# Patient Record
Sex: FEMALE | Race: NATIVE HAWAIIAN OR OTHER PACIFIC ISLANDER | NOT HISPANIC OR LATINO | URBAN - METROPOLITAN AREA
[De-identification: names, ages, dates, MRNs, and addresses within clinical notes are randomized per-mention and may not be internally consistent; named-entity substitution may affect disease eponyms.]

---

## 2023-06-02 ENCOUNTER — INPATIENT (INPATIENT)
Facility: HOSPITAL | Age: 63
LOS: 10 days | Discharge: HOME HEALTH | DRG: 65 | End: 2023-06-13
Attending: PSYCHIATRY & NEUROLOGY | Admitting: INTERNAL MEDICINE
Payer: MEDICARE

## 2023-06-02 VITALS
OXYGEN SATURATION: 99 % | RESPIRATION RATE: 18 BRPM | HEIGHT: 60 IN | DIASTOLIC BLOOD PRESSURE: 82 MMHG | WEIGHT: 212.97 LBS | TEMPERATURE: 98 F | SYSTOLIC BLOOD PRESSURE: 180 MMHG | HEART RATE: 70 BPM

## 2023-06-02 DIAGNOSIS — E11.9 TYPE 2 DIABETES MELLITUS WITHOUT COMPLICATIONS: ICD-10-CM

## 2023-06-02 DIAGNOSIS — R29.898 OTHER SYMPTOMS AND SIGNS INVOLVING THE MUSCULOSKELETAL SYSTEM: ICD-10-CM

## 2023-06-02 DIAGNOSIS — Z86.73 PERSONAL HISTORY OF TRANSIENT ISCHEMIC ATTACK (TIA), AND CEREBRAL INFARCTION WITHOUT RESIDUAL DEFICITS: ICD-10-CM

## 2023-06-02 DIAGNOSIS — I25.10 ATHEROSCLEROTIC HEART DISEASE OF NATIVE CORONARY ARTERY WITHOUT ANGINA PECTORIS: ICD-10-CM

## 2023-06-02 DIAGNOSIS — I10 ESSENTIAL (PRIMARY) HYPERTENSION: ICD-10-CM

## 2023-06-02 DIAGNOSIS — N17.9 ACUTE KIDNEY FAILURE, UNSPECIFIED: ICD-10-CM

## 2023-06-02 DIAGNOSIS — Z29.9 ENCOUNTER FOR PROPHYLACTIC MEASURES, UNSPECIFIED: ICD-10-CM

## 2023-06-02 LAB
A1C WITH ESTIMATED AVERAGE GLUCOSE RESULT: 9.8 % — HIGH (ref 4–5.6)
ALBUMIN SERPL ELPH-MCNC: 3.4 G/DL — SIGNIFICANT CHANGE UP (ref 3.3–5)
ALP SERPL-CCNC: 117 U/L — SIGNIFICANT CHANGE UP (ref 40–120)
ALT FLD-CCNC: 12 U/L — SIGNIFICANT CHANGE UP (ref 10–45)
ANION GAP SERPL CALC-SCNC: 8 MMOL/L — SIGNIFICANT CHANGE UP (ref 5–17)
APPEARANCE UR: CLEAR — SIGNIFICANT CHANGE UP
AST SERPL-CCNC: 11 U/L — SIGNIFICANT CHANGE UP (ref 10–40)
BACTERIA # UR AUTO: PRESENT /HPF
BASOPHILS # BLD AUTO: 0.02 K/UL — SIGNIFICANT CHANGE UP (ref 0–0.2)
BASOPHILS NFR BLD AUTO: 0.3 % — SIGNIFICANT CHANGE UP (ref 0–2)
BILIRUB SERPL-MCNC: 0.2 MG/DL — SIGNIFICANT CHANGE UP (ref 0.2–1.2)
BILIRUB UR-MCNC: NEGATIVE — SIGNIFICANT CHANGE UP
BUN SERPL-MCNC: 31 MG/DL — HIGH (ref 7–23)
CALCIUM SERPL-MCNC: 8.8 MG/DL — SIGNIFICANT CHANGE UP (ref 8.4–10.5)
CHLORIDE SERPL-SCNC: 105 MMOL/L — SIGNIFICANT CHANGE UP (ref 96–108)
CO2 SERPL-SCNC: 24 MMOL/L — SIGNIFICANT CHANGE UP (ref 22–31)
COLOR SPEC: YELLOW — SIGNIFICANT CHANGE UP
COMMENT - URINE: SIGNIFICANT CHANGE UP
CREAT ?TM UR-MCNC: 39 MG/DL — SIGNIFICANT CHANGE UP
CREAT SERPL-MCNC: 1.7 MG/DL — HIGH (ref 0.5–1.3)
CRP SERPL-MCNC: <3 MG/L — SIGNIFICANT CHANGE UP (ref 0–4)
DIFF PNL FLD: ABNORMAL
EGFR: 33 ML/MIN/1.73M2 — LOW
EOSINOPHIL # BLD AUTO: 0.11 K/UL — SIGNIFICANT CHANGE UP (ref 0–0.5)
EOSINOPHIL NFR BLD AUTO: 1.4 % — SIGNIFICANT CHANGE UP (ref 0–6)
EPI CELLS # UR: SIGNIFICANT CHANGE UP /HPF (ref 0–5)
ERYTHROCYTE [SEDIMENTATION RATE] IN BLOOD: 29 MM/HR — HIGH
ESTIMATED AVERAGE GLUCOSE: 235 MG/DL — HIGH (ref 68–114)
GLUCOSE BLDC GLUCOMTR-MCNC: 336 MG/DL — HIGH (ref 70–99)
GLUCOSE SERPL-MCNC: 360 MG/DL — HIGH (ref 70–99)
GLUCOSE SERPL-MCNC: 404 MG/DL — HIGH (ref 70–99)
GLUCOSE UR QL: 500
HCT VFR BLD CALC: 37.2 % — SIGNIFICANT CHANGE UP (ref 34.5–45)
HGB BLD-MCNC: 12.7 G/DL — SIGNIFICANT CHANGE UP (ref 11.5–15.5)
IMM GRANULOCYTES NFR BLD AUTO: 0.1 % — SIGNIFICANT CHANGE UP (ref 0–0.9)
KETONES UR-MCNC: NEGATIVE — SIGNIFICANT CHANGE UP
LEUKOCYTE ESTERASE UR-ACNC: NEGATIVE — SIGNIFICANT CHANGE UP
LYMPHOCYTES # BLD AUTO: 1.89 K/UL — SIGNIFICANT CHANGE UP (ref 1–3.3)
LYMPHOCYTES # BLD AUTO: 23.7 % — SIGNIFICANT CHANGE UP (ref 13–44)
MAGNESIUM SERPL-MCNC: 1.9 MG/DL — SIGNIFICANT CHANGE UP (ref 1.6–2.6)
MCHC RBC-ENTMCNC: 26.8 PG — LOW (ref 27–34)
MCHC RBC-ENTMCNC: 34.1 GM/DL — SIGNIFICANT CHANGE UP (ref 32–36)
MCV RBC AUTO: 78.5 FL — LOW (ref 80–100)
MONOCYTES # BLD AUTO: 0.46 K/UL — SIGNIFICANT CHANGE UP (ref 0–0.9)
MONOCYTES NFR BLD AUTO: 5.8 % — SIGNIFICANT CHANGE UP (ref 2–14)
NEUTROPHILS # BLD AUTO: 5.49 K/UL — SIGNIFICANT CHANGE UP (ref 1.8–7.4)
NEUTROPHILS NFR BLD AUTO: 68.7 % — SIGNIFICANT CHANGE UP (ref 43–77)
NITRITE UR-MCNC: NEGATIVE — SIGNIFICANT CHANGE UP
NRBC # BLD: 0 /100 WBCS — SIGNIFICANT CHANGE UP (ref 0–0)
NT-PROBNP SERPL-SCNC: 277 PG/ML — SIGNIFICANT CHANGE UP (ref 0–300)
OSMOLALITY UR: 401 MOSM/KG — SIGNIFICANT CHANGE UP (ref 300–900)
PH UR: 6 — SIGNIFICANT CHANGE UP (ref 5–8)
PLATELET # BLD AUTO: 242 K/UL — SIGNIFICANT CHANGE UP (ref 150–400)
POTASSIUM SERPL-MCNC: 4.7 MMOL/L — SIGNIFICANT CHANGE UP (ref 3.5–5.3)
POTASSIUM SERPL-SCNC: 4.7 MMOL/L — SIGNIFICANT CHANGE UP (ref 3.5–5.3)
POTASSIUM UR-SCNC: 24 MMOL/L — SIGNIFICANT CHANGE UP
PROT ?TM UR-MCNC: 196 MG/DL — HIGH (ref 0–12)
PROT SERPL-MCNC: 6.5 G/DL — SIGNIFICANT CHANGE UP (ref 6–8.3)
PROT UR-MCNC: 100 MG/DL
PROT/CREAT UR-RTO: 5 RATIO — HIGH (ref 0–0.2)
RBC # BLD: 4.74 M/UL — SIGNIFICANT CHANGE UP (ref 3.8–5.2)
RBC # FLD: 14.1 % — SIGNIFICANT CHANGE UP (ref 10.3–14.5)
RBC CASTS # UR COMP ASSIST: ABNORMAL /HPF
SODIUM SERPL-SCNC: 137 MMOL/L — SIGNIFICANT CHANGE UP (ref 135–145)
SODIUM UR-SCNC: 67 MMOL/L — SIGNIFICANT CHANGE UP
SP GR SPEC: 1.02 — SIGNIFICANT CHANGE UP (ref 1–1.03)
TROPONIN T SERPL-MCNC: 0.01 NG/ML — SIGNIFICANT CHANGE UP (ref 0–0.01)
UROBILINOGEN FLD QL: 0.2 E.U./DL — SIGNIFICANT CHANGE UP
UUN UR-MCNC: 408 MG/DL — SIGNIFICANT CHANGE UP
WBC # BLD: 7.98 K/UL — SIGNIFICANT CHANGE UP (ref 3.8–10.5)
WBC # FLD AUTO: 7.98 K/UL — SIGNIFICANT CHANGE UP (ref 3.8–10.5)
WBC UR QL: < 5 /HPF — SIGNIFICANT CHANGE UP

## 2023-06-02 PROCEDURE — 70450 CT HEAD/BRAIN W/O DYE: CPT | Mod: 26,MA

## 2023-06-02 PROCEDURE — 99222 1ST HOSP IP/OBS MODERATE 55: CPT

## 2023-06-02 PROCEDURE — 99222 1ST HOSP IP/OBS MODERATE 55: CPT | Mod: GC

## 2023-06-02 PROCEDURE — 71045 X-RAY EXAM CHEST 1 VIEW: CPT | Mod: 26

## 2023-06-02 PROCEDURE — 99285 EMERGENCY DEPT VISIT HI MDM: CPT

## 2023-06-02 PROCEDURE — 72125 CT NECK SPINE W/O DYE: CPT | Mod: 26,MA

## 2023-06-02 RX ORDER — AMLODIPINE BESYLATE 2.5 MG/1
10 TABLET ORAL DAILY
Refills: 0 | Status: DISCONTINUED | OUTPATIENT
Start: 2023-06-03 | End: 2023-06-13

## 2023-06-02 RX ORDER — DEXTROSE 50 % IN WATER 50 %
25 SYRINGE (ML) INTRAVENOUS ONCE
Refills: 0 | Status: DISCONTINUED | OUTPATIENT
Start: 2023-06-02 | End: 2023-06-13

## 2023-06-02 RX ORDER — DEXTROSE 50 % IN WATER 50 %
12.5 SYRINGE (ML) INTRAVENOUS ONCE
Refills: 0 | Status: DISCONTINUED | OUTPATIENT
Start: 2023-06-02 | End: 2023-06-13

## 2023-06-02 RX ORDER — METOPROLOL TARTRATE 50 MG
50 TABLET ORAL DAILY
Refills: 0 | Status: DISCONTINUED | OUTPATIENT
Start: 2023-06-03 | End: 2023-06-05

## 2023-06-02 RX ORDER — SODIUM CHLORIDE 9 MG/ML
1000 INJECTION, SOLUTION INTRAVENOUS
Refills: 0 | Status: DISCONTINUED | OUTPATIENT
Start: 2023-06-02 | End: 2023-06-13

## 2023-06-02 RX ORDER — INSULIN GLARGINE 100 [IU]/ML
10 INJECTION, SOLUTION SUBCUTANEOUS AT BEDTIME
Refills: 0 | Status: DISCONTINUED | OUTPATIENT
Start: 2023-06-02 | End: 2023-06-05

## 2023-06-02 RX ORDER — ATORVASTATIN CALCIUM 80 MG/1
80 TABLET, FILM COATED ORAL AT BEDTIME
Refills: 0 | Status: DISCONTINUED | OUTPATIENT
Start: 2023-06-02 | End: 2023-06-13

## 2023-06-02 RX ORDER — INSULIN LISPRO 100/ML
VIAL (ML) SUBCUTANEOUS
Refills: 0 | Status: DISCONTINUED | OUTPATIENT
Start: 2023-06-02 | End: 2023-06-13

## 2023-06-02 RX ORDER — GLUCAGON INJECTION, SOLUTION 0.5 MG/.1ML
1 INJECTION, SOLUTION SUBCUTANEOUS ONCE
Refills: 0 | Status: DISCONTINUED | OUTPATIENT
Start: 2023-06-02 | End: 2023-06-13

## 2023-06-02 RX ORDER — DEXTROSE 50 % IN WATER 50 %
15 SYRINGE (ML) INTRAVENOUS ONCE
Refills: 0 | Status: DISCONTINUED | OUTPATIENT
Start: 2023-06-02 | End: 2023-06-13

## 2023-06-02 RX ORDER — SODIUM CHLORIDE 9 MG/ML
1000 INJECTION INTRAMUSCULAR; INTRAVENOUS; SUBCUTANEOUS ONCE
Refills: 0 | Status: COMPLETED | OUTPATIENT
Start: 2023-06-02 | End: 2023-06-02

## 2023-06-02 RX ORDER — HEPARIN SODIUM 5000 [USP'U]/ML
5000 INJECTION INTRAVENOUS; SUBCUTANEOUS EVERY 8 HOURS
Refills: 0 | Status: DISCONTINUED | OUTPATIENT
Start: 2023-06-02 | End: 2023-06-03

## 2023-06-02 RX ADMIN — SODIUM CHLORIDE 1000 MILLILITER(S): 9 INJECTION INTRAMUSCULAR; INTRAVENOUS; SUBCUTANEOUS at 17:08

## 2023-06-02 RX ADMIN — Medication 8: at 22:27

## 2023-06-02 RX ADMIN — ATORVASTATIN CALCIUM 80 MILLIGRAM(S): 80 TABLET, FILM COATED ORAL at 21:57

## 2023-06-02 RX ADMIN — INSULIN GLARGINE 10 UNIT(S): 100 INJECTION, SOLUTION SUBCUTANEOUS at 22:26

## 2023-06-02 NOTE — H&P ADULT - NSHPPHYSICALEXAM_GEN_ALL_CORE
General: Alert and oriented x 3. No acute distress. Obese habitus.   Eyes: EOMI. Anicteric.  HEENT: Moist mucous membranes. Mallampati Class III. No scleral icterus. No cervical lymphadenopathy.  Lungs: Clear to auscultation bilaterally. No accessory muscle use.  Cardiovascular: Regular rate and rhythm. No murmur. No JVD.  Abdomen: Soft, non-tender and + distended. No palpable masses.  Extremities: No edema. Non-tender.  Skin: No rashes or lesions. Warm.  Neurologic: L sided facial numbness, slight ptosis on L side of face. No obvious dysmetria/dysdiadakokinesia. Strength 3/5 on LLE, 1/5 RLE, strength 4/5 RUE, 2/5 LUE. Reflexes 2+ on R patellar, 1+ L patellar. No other CN deficits   Psychiatric: Cooperative. Appropriate mood and affect. General: Alert and oriented x 3. No acute distress. Obese habitus.   Eyes: EOMI. Anicteric.  HEENT: Moist mucous membranes. Mallampati Class III. No scleral icterus. No cervical lymphadenopathy.  Lungs: Clear to auscultation bilaterally. No accessory muscle use.  Cardiovascular: Regular rate and rhythm. No murmur. No JVD.  Abdomen: Soft, non-tender and + distended. No palpable masses.  Extremities: No edema. Non-tender.  Skin: No rashes or lesions. Warm.  Neurologic: L sided facial numbness, slight ptosis on L side of face. No obvious dysmetria/dysdiadakokinesia. Strength 3/5 on LLE, 1/5 RLE, strength 4/5 RUE, 2/5 LUE. Reflexes 2+ on R patellar, 1+ L patellar. + Rombergs. Slow but intact gait. No other CN deficits   Psychiatric: Cooperative. Appropriate mood and affect.

## 2023-06-02 NOTE — H&P ADULT - NSHPLABSRESULTS_GEN_ALL_CORE
.  LABS:                         12.7   7.98  )-----------( 242      ( 2023 14:20 )             37.2     06-02    137  |  105  |  31<H>  ----------------------------<  360<H>  4.7   |  24  |  1.70<H>    Ca    8.8      2023 14:20  Mg     1.9     06-02    TPro  6.5  /  Alb  3.4  /  TBili  0.2  /  DBili  x   /  AST  11  /  ALT  12  /  AlkPhos  117  06-02      Urinalysis Basic - ( 2023 14:20 )    Color: Yellow / Appearance: Clear / S.020 / pH: x  Gluc: x / Ketone: NEGATIVE  / Bili: Negative / Urobili: 0.2 E.U./dL   Blood: x / Protein: 100 mg/dL / Nitrite: NEGATIVE   Leuk Esterase: NEGATIVE / RBC: 5-10 /HPF / WBC < 5 /HPF   Sq Epi: x / Non Sq Epi: x / Bacteria: Present /HPF      CARDIAC MARKERS ( 2023 14:20 )  x     / 0.01 ng/mL / x     / x     / x                RADIOLOGY, EKG & ADDITIONAL TESTS: Reviewed.

## 2023-06-02 NOTE — H&P ADULT - PROBLEM SELECTOR PLAN 3
Pt with hx of DM, taking Lispro 25U once a day at home. Pt's daughter Rosemarie states she believes her mother was told to take her insulin more frequently. Pt also taking Metformin 1g BID. Pt with Glucose of 360 in ED on admission, UA showed 500 glucose and proteinuria.   Plan:   - F/u with A1C   - mISS inpatient with consistent carb diet Pt with hx of DM, taking Lispro 25U once a day at home. Pt's daughter Rosemarie states she believes her mother was told to take her insulin more frequently. Pt also taking Metformin 1g BID. Pt with Glucose of 360 in ED on admission, UA showed 500 glucose and proteinuria.   Plan:   - F/u with A1C   - mISS inpatient with consistent carb diet  - Lantus 10U started inpatient, reassess pre-meal insulin requirements Pt with hx of DM, taking Lispro 25U once a day at home. Pt's daughter Rosemarie states she believes her mother was told to take her insulin more frequently. Pt also taking Metformin 1g BID. Pt with Glucose of 360 in ED on admission, UA showed 500 glucose and proteinuria.   Plan:   - Pt with A1C of 9.8  - mISS inpatient with consistent carb diet  - Lantus 10U started inpatient, reassess pre-meal insulin requirements  - Consider Endocrine consult in AM

## 2023-06-02 NOTE — H&P ADULT - NSHPREVIEWOFSYSTEMS_GEN_ALL_CORE
Constitutional: Denies weight loss, fever and chills.  HEENT: Denies changes in vision and hearing.   Respiratory: Denies SOB and cough.  CV: Denies palpitations and CP.   GI: Denies abdominal pain, nausea, vomiting and diarrhea.   : Denies dysuria and urinary frequency; states she has had some hesitancy with micturition   MSK: Denies myalgia and joint pain. Endorses 1.5 weeks of b/l LE weakness and upper extremity weakness   Skin: Denies rash and pruritus.  Neurological: Denies headache and syncope.  Psychiatric: Denies recent changes in mood. Denies anxiety and depression.

## 2023-06-02 NOTE — H&P ADULT - ASSESSMENT
Ms. Hendricks is a 62 y/o F with a PMHx of [place] Palsy, TIIDM, CVA (left side weak), CAD, HLD, HTN, Asthma, current smoker with a 52 pack year hx, brought for evaluation of worsening acute on chronic  weakness since 5/21.

## 2023-06-02 NOTE — ED PROVIDER NOTE - PHYSICAL EXAMINATION
General:  Generally weak, well cared for, no resp distress   HEENT:  No conjunctival injection, neck supple, no congestion, mild mid cspine tenderness.  No scalp hematoma  Chest:  Non-tender, no crepitance  Lungs:  Clear to auscultation bilaterally   Heart:  s1s2 normal, no murmur  Abdomen:  soft, non-tender, non-distended  :  Deferred  Rectal:  Deferred  Extremities:  +mild edema, normal perfusion, no joint swelling or tenderness  Neuro:  Alert, oriented x 2, right Banks's noted.  Minimally slurred speech.  No UE drift.  Drift LLE (able to lift but touches bed), normal finger to nose, normal sensation, no visual field defect.  Unable to sit up in bed unassisted  Psychiatry:  Calm, cooperative, no expression of suicidal or homicidal ideation

## 2023-06-02 NOTE — ED PROVIDER NOTE - OBJECTIVE STATEMENT
64 yo F PMH [place] Palsy (right), DM, CVA (left side weak), CAD, HLD, HTN, Asthma, current smoker brought for evaluation of worsening generalized weakness since 5/21.  On 5/21 was taken to an ER in [place] for pain to the left shin and ankle.  Had a sonogram and was told there was no blood clot, but that she had fluid in her lungs and needed follow up.  Patient sees an NP in [facility] Jennifer Madrigal who told the family to go back to the ER if she needed further care.   Since 5/21, patient has had increasing generalized weakness.  She is unable to get up without assistance and falls if she tries.  Unclear if she has hit her head at any time  Her speech has been increasingly slurred  No new facial or unilateral weakness  She has been SOB on and off and has mild swelling of her legs  No CP, Abd pain, cough, fever, n,v,d  Incontinence of urine, but it is unclear if it is a loss of control or more so because she cannot get up in time to get to the bathroom 62 yo F PMH [place] Palsy (right), DM, CVA (left side weak), CAD, HLD, HTN, Asthma, current smoker brought for evaluation of worsening generalized weakness since 5/21.  On 5/21 was taken to an ER in [place] for pain to the left shin and ankle.  Had a sonogram and was told there was no blood clot, but that she had fluid in her lungs and needed follow up.  Patient sees an NP in [facility] Jennifer Madrigal who told the family to go back to the ER if she needed further care.   Since 5/21, patient has had increasing generalized weakness.  She is unable to get up without assistance and falls if she tries.  Unclear if she has hit her head at any time  Her speech has been increasingly slurred.    PMH:  Bell's palsy affecting right side, DM, CVA; Prior stroke residual: Left sided weakness, general feeling of being very cold, CAD, HLD, HTN, Aslthma  All:codeine  Soc:  CUrrent smoker  Surg: Stents, appy, csection    Meds: Lisniopril    No new facial or unilateral weakness  She has been SOB on and off and has mild swelling of her legs  No CP, Abd pain, cough, fever, n,v,d  Incontinence of urine, but it is unclear if it is a loss of control or more so because she cannot get up in time to get to the bathroom 62 yo F PMH [place] Palsy (right), DM, CVA (left side weak), CAD, HLD, HTN, Asthma, current smoker brought for evaluation of worsening generalized weakness since 5/21.  On 5/21 was taken to an ER in [place] for pain to the left shin and ankle.  Had a sonogram and was told there was no blood clot, but that she had fluid in her lungs and needed follow up.  Patient sees an NP in [facility] Jennifer Madrigal who told the family to go back to the ER if she needed further care.   Since 5/21, patient has had increasing generalized weakness.  She is unable to get up without assistance and falls if she tries.  Unclear if she has hit her head at any time  Her speech has been increasingly slurred.    Family adds that she is increasingly sedentary, that even before getting sicker in May, she didn't do very much and had to be encouraged to move around.  Now, she is significantly deconditioned and cannot even sit up without assistance.         PMH:  Bell's palsy affecting right side, DM, CVA; Prior stroke residual: Left sided weakness, general feeling of being very cold, CAD, HLD, HTN, Aslthma  All: codeine  Soc:  CUrrent smoker  Surg: Stents, appy, csection    Meds: Lisniopril    No new facial or unilateral weakness  She has been SOB on and off and has mild swelling of her legs  No CP, Abd pain, cough, fever, n,v,d  Incontinence of urine, but it is unclear if it is a loss of control or more so because she cannot get up in time to get to the bathroom

## 2023-06-02 NOTE — CONSULT NOTE ADULT - SUBJECTIVE AND OBJECTIVE BOX
Neurology Consult    Patient is a 63y old  Female who presents with a chief complaint of difficulty ambulating    HPI:  Ms. Hendricks is a 64 y/o F with a PMHx of [place] Palsy, TIIDM, CVA (left side weak), CAD, HLD, HTN, Asthma, current smoker with a 52 pack year hx, brought for evaluation of worsening generalized weakness since . Pt states she has had a CVA in the past with residual L sided weakness in her leg and arm, however, pt began to notice continual weakness in both her upper and lower extremities. Pt states she was taken to an ER in [place] for pain in her L ankle and was d/c after no DVT was discovered. Since then, pt states she lost her balance upon getting out of bed 2x and presented to ER at [facility]  for further evaluation. Pt states she has had increasingly difficulty in using her wrists and ankles. Pt denies diarrheal illness previously, denies SOB and cough, palpitations and CP, headaches, fevers, chills, nausea, vomiting, diarrhea, constipation, dysuria, hematuria, hematochezia. Pt noted to have had some urinary hesitancy     ED Course:  ED Vitals: Initial: T 97.8, HR 70, /82, satting 99% on RA   Notable labs: WBC 7.98, Hgb/Hct 12.7/37.2, platelets 242; Na 137, K 4.7, Cl 105, CO2 24, BUN/Cr 31/1.70, Glucose 360, troponin 0.01   UA: Protein, small blood, RBC, bacteria, + Glucose   CXR: No evidence of acute cardiopulmonary disease  CTH: No acute intracranial hemorrhage, mass effect, or demarcated recent infarction, small vessel ischemic change throughout cerebral white matter and deep gray/white matter lacunae.  CT Cervical Spine: No fracture   EKG: NSR with L axis deviation and occasional Q waves but no active ischemic changes   Pt was admitted to [facility] for further workup of generalized weakness      PMH:  Bell's palsy affecting right side, DM, CVA; Prior stroke residual: Left sided weakness, general feeling of being very cold, CAD, HLD, HTN, Aslthma  All: codeine  Soc:  CUrrent smoker  surg: Stents, appy, csection    Meds: Lisniopril        PAST MEDICAL & SURGICAL HISTORY:      FAMILY HISTORY:      Social History: (-) x 3    Allergies    codeine (Unknown)    Intolerances        MEDICATIONS  (STANDING):    MEDICATIONS  (PRN):      Review of systems:    Admits to difficulty ambulating and overall feeling weak.     Vital Signs Last 24 Hrs  T(C): 36.6 (2023 18:29), Max: 36.6 (2023 13:25)  T(F): 97.8 (2023 18:29), Max: 97.8 (2023 13:25)  HR: 69 (2023 18:29) (66 - 70)  BP: 145/67 (2023 18:29) (133/66 - 180/82)  BP(mean): --  RR: 17 (2023 18:29) (17 - 18)  SpO2: 100% (:29) (99% - 100%)    Parameters below as of 2023 18:29  Patient On (Oxygen Delivery Method): room air        Examination:  General:  Appearance is consistent with chronologic age.   Cognitive/Language:  The patient is oriented to person, place, time and date.  Recent and remote memory intact.  Fund of knowledge is intact and normal.  Language with normal repetition, comprehension and naming.  Nondysarthric.    Eyes: EOMI w/o nystagmus. L sided ptosis, weakness on forceful closure of eyes  Face:  decrease cold sensation on right face,  facial asymmetric on eyebrow raising, R side higher than left   Ears/Nose/Throat:  Hearing grossly intact b/l.  Palate elevates midline.  Tongue and uvula midline.   Motor examination:   Normal tone, bulk and range of motion.  No tenderness, twitching, tremors or involuntary movements.  Formal Muscle Strength Testing: (MRC grade R/L) 4+/5 UE; 4/5 hip flexion on right, 4- hip flexion on Left,  5/5 in B/L distal LE.  No observable drift.  Reflexes:   2+ biceps, triceps, brachioradialis, +3 patella and 2+ Achilles.  Plantar response upgoing b/l.    Sensory examination:   Decreased on cold sensation and vibration in right >Left, romberg +  Cerebellum:   FTN intact with normal DREW in all limbs.  No dysmetria   Gait: Unsteady upon standing with eyes open and closed, some mild truncal ataxia on ambulation, narrow based,       Labs:   CBC Full  -  ( 2023 14:20 )  WBC Count : 7.98 K/uL  RBC Count : 4.74 M/uL  Hemoglobin : 12.7 g/dL  Hematocrit : 37.2 %  Platelet Count - Automated : 242 K/uL  Mean Cell Volume : 78.5 fl  Mean Cell Hemoglobin : 26.8 pg  Mean Cell Hemoglobin Concentration : 34.1 gm/dL  Auto Neutrophil # : 5.49 K/uL  Auto Lymphocyte # : 1.89 K/uL  Auto Monocyte # : 0.46 K/uL  Auto Eosinophil # : 0.11 K/uL  Auto Basophil # : 0.02 K/uL  Auto Neutrophil % : 68.7 %  Auto Lymphocyte % : 23.7 %  Auto Monocyte % : 5.8 %  Auto Eosinophil % : 1.4 %  Auto Basophil % : 0.3 %    -    137  |  105  |  31<H>  ----------------------------<  360<H>  4.7   |  24  |  1.70<H>    Ca    8.8      2023 14:20  Mg     1.9     06-02    TPro  6.5  /  Alb  3.4  /  TBili  0.2  /  DBili  x   /  AST  11  /  ALT  12  /  AlkPhos  117  06-02    LIVER FUNCTIONS - ( 2023 14:20 )  Alb: 3.4 g/dL / Pro: 6.5 g/dL / ALK PHOS: 117 U/L / ALT: 12 U/L / AST: 11 U/L / GGT: x             Urinalysis Basic - ( 2023 14:20 )    Color: Yellow / Appearance: Clear / S.020 / pH: x  Gluc: x / Ketone: NEGATIVE  / Bili: Negative / Urobili: 0.2 E.U./dL   Blood: x / Protein: 100 mg/dL / Nitrite: NEGATIVE   Leuk Esterase: NEGATIVE / RBC: 5-10 /HPF / WBC < 5 /HPF   Sq Epi: x / Non Sq Epi: x / Bacteria: Present /HPF          Neuroimaging:  NCHCT: CT Head No Cont:   ACC: [id number] EXAM:  CT BRAIN   ORDERED BY: CARMEN BULL     PROCEDURE DATE:  2023          INTERPRETATION:  PROCEDURE: CT head without intravenous contrast    INDICATIONS: Left side weakness/falls. Prior stroke.    TECHNIQUE:  Serial axial images were obtained from the skull base to the   vertex without the use of intravenous contrast. Coronal and sagittal   reformatted images were obtained.    COMPARISON EXAMINATION: None.    FINDINGS:  VENTRICLES AND SULCI:  Ventricles and sulci are prominent secondary to   parenchymal volume loss. No hydrocephalus.  INTRA-AXIAL: No acute intracranial hemorrhage or midline shift is   present. The gray-white matter differentiation is preserved. Moderate   patchy areas of hypodensity in the periventricular white matter that are   nonspecific but could represent sequela of small vessel ischemia. There   is left thalamic lacunar infarct. Slit-like hypodensity in the left   striatal capsular region (1:11), which may represent sequela of prior   hemorrhage/infarct.  EXTRA-AXIAL: No extra-axial fluid collection is present.  VISUALIZED SINUSES: The visualized paranasal sinuses are clear.  VISUALIZED MASTOIDS:  Clear.  CALVARIUM:  No fracture. Skull base appears osteopenic.    IMPRESSION:  1.  No acute intracranial hemorrhage, mass effect, or demarcated recent   infarction.  2.  Small vessel ischemic change throughout cerebral white matter and   deep gray/white matter lacunae.    --- End of Report ---          ROSELYN ROWE MD; Resident Radiologist  This document has been electronically signed.  ANGIE CELESTIN MD; Attending Radiologist  This document has been electronically signed. 2023  3:45PM (23 @ 15:05)      23 @ 18:41

## 2023-06-02 NOTE — H&P ADULT - PROBLEM SELECTOR PLAN 2
Pt with 52 year pack history, takes Lisinopril 40mg qd, Amlodipine 10mg qd, and Metoprolol 50mg qd  Plan:  - Will c/w Amlodipine 10mg with hold parameters   - Metoprolol 50mg QD with hold parameters  - Holding Lisinopril 40mg i/s/o JUAN JOSÉ as discussed below

## 2023-06-02 NOTE — ED ADULT TRIAGE NOTE - CHIEF COMPLAINT QUOTE
Pt presents co progressing weakness worse on L side and slurred speech progressing over past week. Notes difficulty ambulating to bathroom resulting in episode of incontinence. Also endorses RUFFIN and b/l ankle swelling. Denies change in sx over the past 24 hours. Hx CVA, CAD, T2DM. EKG done.

## 2023-06-02 NOTE — H&P ADULT - PROBLEM SELECTOR PLAN 4
Pt with hx of CVA last year 2022 with residual L sided weakness. Pt also with questionable hx of PVD, was seen by podiatrist on 5/21 who mentioned pt had diminished flow in her feet. No calf tenderness on exam. CTH non contrast on 6/2 showed no acute intracranial hemorrhage, mass effect, or demarcated recent infarction and small vessel ischemic change throughout cerebral white matter and deep gray/white matter lacunae.  Plan:   - Will c/w DVT ppx with Heparin SubQ as discussed below Pt with hx of CVA last year 2022 with residual L sided weakness. Pt also with questionable hx of PVD, was seen by podiatrist on 5/21 who mentioned pt had diminished flow in her feet. No calf tenderness on exam. CTH non contrast on 6/2 showed no acute intracranial hemorrhage, mass effect, or demarcated recent infarction and small vessel ischemic change throughout cerebral white matter and deep gray/white matter lacunae.  Plan:   - Will c/w DVT ppx with Heparin SubQ as discussed below  - Consider Aspirin therapy for hx of stroke

## 2023-06-02 NOTE — H&P ADULT - PROBLEM SELECTOR PLAN 6
Pt with BUN/Cr of 31/1.70 on admission. Pt unsure of baseline Cr, states she was never told she had kidney problems before. UA showing proteinuria and glucosuria.   Plan:   - F/u with urine studies   - F/u bladder scan to r/o retention   - Continue to trend sCr, avoid nephrotoxic agents   - DVT ppx with Heparin SubQ

## 2023-06-02 NOTE — H&P ADULT - ATTENDING COMMENTS
Obtained majority of HPI d/t patient being A&Ox3 from daughter Aneesh  Dysarthria and confusion for several months, no b/l LE weakness and falls (darlene head trauma or LOC) with urinary incontinence for 1 week    Neuro PE:   MS: Awake, alert, oriented to person, place only. Dysarthric speech. Normal fund of knowledge.  CN: left ptosis, PERRL, EOMI, sensation intact in V1-3 distribution bilaterally, symmetric facies, smile and brow furrow. Palate midline with symmetric elevation, tongue midline, 5/5 strength of SCM and trapezius.  Motor: 4/5 strength of biceps, triceps, hand , RLE: 3/5 hip flexors, plantarflexion and dorsiflexion bilaterally LLE: 3/5 hip flexors, plantarflexion and dorsiflexion bilaterally  Reflexes: 2+ biceps, triceps, brachioradialis, 3+ patellar and achilles bilaterally. Plantar reflex upgoing   Sensation: intact to light touch in all extremities  Coordination: dysmetria of the left finger noted  Gait: deferred    #R/O Acute CVA: CTH negative, will obtain MRA brain and neck (CTA head CI d/t JUAN JOSÉ), hold ASA until hemorrhagic stroke r/o, stroke consult (recs) appreciated  #R/O Cord compression: MRI non con of the spine   #Toxic Metabolic Encephalopathy: no S/S for infection, f/u organic etiology Obtained majority of HPI d/t patient being A&Ox3 from daughter Aneesh  Dysarthria and confusion for several months, b/l LE weakness and falls (darlene head trauma or LOC) with urinary incontinence for 1 week    Neuro PE:   MS: Awake, alert, oriented to person, place only. Dysarthric speech. Normal fund of knowledge.  CN: left ptosis, PERRL, EOMI, sensation intact in V1-3 distribution bilaterally, symmetric facies, smile and brow furrow. Palate midline with symmetric elevation, tongue midline, 5/5 strength of SCM and trapezius.  Motor: 4/5 strength of biceps, triceps, hand , RLE: 3/5 hip flexors, plantarflexion and dorsiflexion bilaterally LLE: 3/5 hip flexors, plantarflexion and dorsiflexion bilaterally  Reflexes: 2+ biceps, triceps, brachioradialis, 3+ patellar and achilles bilaterally. Plantar reflex upgoing   Sensation: intact to light touch in all extremities  Coordination: dysmetria of the left finger noted  Gait: deferred    #R/O Acute CVA: CTH negative, will obtain MRA brain and neck (CTA head CI d/t JUAN JOSÉ), hold ASA until hemorrhagic stroke r/o, stroke consult (recs) appreciated  #R/O Cord compression: MRI non con of the spine   #Toxic Metabolic Encephalopathy: no S/S for infection, f/u organic etiology Obtained majority of HPI d/t patient being A&Ox3 from daughter Aneesh  Dysarthria and confusion for several months, b/l LE weakness and falls (denies head trauma or LOC) with urinary incontinence for 1 week    Neuro PE:   MS: Awake, alert, oriented to person, place only. Dysarthric speech. Normal fund of knowledge.  CN: left ptosis, PERRL, EOMI, sensation intact in V1-3 distribution bilaterally, symmetric facies, smile and brow furrow. Palate midline with symmetric elevation, tongue midline, 5/5 strength of SCM and trapezius.  Motor: 4/5 strength of biceps, triceps, hand , RLE: 3/5 hip flexors, plantarflexion and dorsiflexion bilaterally LLE: 3/5 hip flexors, plantarflexion and dorsiflexion bilaterally  Reflexes: 2+ biceps, triceps, brachioradialis, 3+ patellar and achilles bilaterally. Plantar reflex upgoing   Sensation: intact to light touch in all extremities  Coordination: dysmetria of the left finger noted  Gait: deferred    #R/O Acute CVA: CTH negative, will obtain MRA brain and neck (CTA head CI d/t JUAN JOSÉ), hold ASA until hemorrhagic stroke r/o, stroke consult (recs) appreciated  #R/O Cord compression: MRI non con of the spine   #Toxic Metabolic Encephalopathy: no S/S for infection, f/u organic etiology Obtained majority of HPI d/t patient being A&Ox1-2 from daughter Aneesh  Dysarthria and confusion for several months, b/l LE weakness and falls (denies head trauma or LOC) with urinary incontinence for 1 week    Neuro PE:   MS: Awake, alert, oriented to person, place only. Dysarthric speech. Normal fund of knowledge.  CN: left ptosis, PERRL, EOMI, sensation intact in V1-3 distribution bilaterally, symmetric facies, smile and brow furrow. Palate midline with symmetric elevation, tongue midline, 5/5 strength of SCM and trapezius.  Motor: 4/5 strength of biceps, triceps, hand , RLE: 3/5 hip flexors, plantarflexion and dorsiflexion bilaterally LLE: 3/5 hip flexors, plantarflexion and dorsiflexion bilaterally  Reflexes: 2+ biceps, triceps, brachioradialis, 3+ patellar and achilles bilaterally. Plantar reflex upgoing   Sensation: intact to light touch in all extremities  Coordination: dysmetria of the left finger noted  Gait: deferred    #R/O Acute CVA: CTH negative, will obtain MRA brain and neck (CTA head CI d/t JUAN JOSÉ), hold ASA until hemorrhagic stroke r/o, stroke consult (recs) appreciated  #R/O Cord compression: MRI non con of the spine   #Toxic Metabolic Encephalopathy: no S/S for infection, f/u organic etiology Obtained majority of HPI d/t patient being A&Ox1-2 from daughter Aneesh  Dysarthria and confusion for several months, b/l LE weakness and falls (denies head trauma or LOC) with urinary incontinence for 1 week    Neuro PE:   MS: Awake, alert, oriented to person, place only. Dysarthric speech. Normal fund of knowledge.  CN: left ptosis, PERRL, EOMI, sensation intact in V1-3 distribution bilaterally, symmetric facies, smile and brow furrow. Palate midline with symmetric elevation, tongue midline, 5/5 strength of SCM and trapezius.  Motor: 4/5 strength of biceps, triceps, hand , RLE: 3/5 hip flexors, plantarflexion and dorsiflexion bilaterally LLE: 3/5 hip flexors, plantarflexion and dorsiflexion bilaterally  Reflexes: 2+ biceps, triceps, brachioradialis, 3+ patellar and achilles bilaterally. Plantar reflex upgoing   Sensation: intact to light touch in all extremities  Coordination: dysmetria of the left finger noted  Gait: deferred    #R/O Acute CVA: CTH negative, will obtain MRA brain and neck (CTA head CI d/t JUAN JOSÉ), hold ASA until hemorrhagic stroke r/o, stroke consult (recs) appreciated  #R/O Cord compression: MRI non con of the spine   #Toxic Metabolic Encephalopathy: no S/S for infection, f/u organic etiologies w/u

## 2023-06-02 NOTE — H&P ADULT - PROBLEM SELECTOR PLAN 7
F: Tolerating oral   E: Replete PRN K > 3.5, Mg > 2   GI: None  DVT: Heparin SubQ   Code: Full code  Dispo: [facility]

## 2023-06-02 NOTE — ED PROVIDER NOTE - CLINICAL SUMMARY MEDICAL DECISION MAKING FREE TEXT BOX
64 yo F PMH as noted above with worsening generalized weakness since 5/21.  Multiple possible etiologies.  Patient could have had a subsequent 64 yo F PMH as noted above with worsening generalized weakness since 5/21.  Multiple possible etiologies include: stroke, infection, motor neuron disease, cardiomyopathy/ischemia, hyponatremia or other electrolyte abnormality, ICH from fall.  Multifactorial etiology likely.  Will  initiate work up with labs, CT, EKG, UA.  Will place on monitor.  Ultimately, patient will require admission as she is significantly deconditioned. 62 yo F PMH as noted above with worsening generalized weakness since 5/21.  Multiple possible etiologies include: stroke, infection, motor neuron disease, cardiomyopathy/ischemia, hyponatremia or other electrolyte abnormality, ICH from fall.  Multifactorial etiology likely.  Will  initiate work up with labs, CT, EKG, UA.  Will place on monitor.  Ultimately, patient will require admission as she is significantly deconditioned.    1645:  Case d/w General Neurology resident who will evaluate the patient  16:55: Case d/w SANDRA who will admit  17:00:  Family updated regarding results and plan.  Adding MRI in the event small stroke not seen on CT

## 2023-06-02 NOTE — ED ADULT NURSE NOTE - NSFALLRISKINTERV_ED_ALL_ED
Assistance OOB with selected safe patient handling equipment if applicable/Assistance with ambulation/Communicate fall risk and risk factors to all staff, patient, and family/Monitor gait and stability/Provide visual cue: yellow wristband, yellow gown, etc/Reinforce activity limits and safety measures with patient and family/Call bell, personal items and telephone in reach/Instruct patient to call for assistance before getting out of bed/chair/stretcher/Non-slip footwear applied when patient is off stretcher/[place] to call system/Physically safe environment - no spills, clutter or unnecessary equipment/Purposeful Proactive Rounding/Room/bathroom lighting operational, light cord in reach

## 2023-06-02 NOTE — CONSULT NOTE ADULT - ASSESSMENT
Ms. Hendricks is a 64 y/o F with a PMHx of [place] Palsy, TIIDM, CVA (left side weak), CAD, HLD, HTN, Asthma, current smoker with a 52 pack year hx, brought for evaluation of worsening generalized weakness and difficulty ambulating since 5/21. Neurology consulted for further eval.   On exam, she is found to have decreased cold sensation and vibration on R compared to L. Also noted to be romberg positive and mild truncal ataxia on ambulation. In the ED found to have uncontrolled hypertension and hyperglycemia. Patient appears to have some evidence of peripheral neuropathy and possible localizing signs concerning for an ischemic event.    Recommendations  - Obtain B12, Folate, TSH, serum electrophoresis and immunofixation  - MR brain already ordered  - follow up results   Ms. Hendricks is a 64 y/o F with a PMHx of [place] Palsy, TIIDM, CVA (left side weak), CAD, HLD, HTN, Asthma, current smoker with a 52 pack year hx, brought for evaluation of worsening generalized weakness and difficulty ambulating since 5/21. Neurology consulted for further eval.   On exam, she is found to have decreased cold sensation and vibration on R compared to L. Also noted to be romberg positive and mild truncal ataxia on ambulation. In the ED found to have uncontrolled hypertension and hyperglycemia. Patient appears to have some evidence of peripheral neuropathy and possible localizing signs i.e pure sensory disturbance on the right concerning for a pure sensory lacunar syndrome.    Recommendations  - CTH: left thalamic lacunar infarct. Slit-like hypodensity in the left striatal capsular region which may represent sequela of prior hemorrhage/infarct.  - Obtain B12, Folate, TSH, serum electrophoresis and immunofixation  - MR brain w/o cont    will follow       Ms. Hendricks is a 62 y/o F with a PMHx of [place] Palsy, TIIDM, CVA (left side weak), CAD, HLD, HTN, Asthma, current smoker with a 52 pack year hx, brought for evaluation of worsening generalized weakness and difficulty ambulating since 5/21. Neurology consulted for further eval.   On exam, she is found to have decreased cold sensation and vibration on R compared to L. Also noted to be romberg positive and mild truncal ataxia on ambulation. In the ED found to have uncontrolled hypertension and hyperglycemia. Patient appears to have some evidence of peripheral neuropathy and possible localizing signs i.e pure sensory disturbance on the right concerning for a pure sensory lacunar syndrome.    Recommendations  - CTH: left thalamic lacunar infarct. Slit-like hypodensity in the left striatal capsular region which may represent sequela of prior hemorrhage/infarct.  - CT cervical appreciated  - Obtain B12, Folate, TSH, serum electrophoresis and immunofixation  - CT Thoracic and Lumbar  - MR brain w/o cont    will follow       Ms. Hendricks is a 62 y/o F with a PMHx of [place] Palsy, TIIDM, CVA (left side weak), CAD, HLD, HTN, Asthma, current smoker with a 52 pack year hx, brought for evaluation of worsening generalized weakness and difficulty ambulating since 5/21. Neurology consulted for further eval.   On exam, she is found to have decreased cold sensation and vibration on R compared to L. Also noted to be romberg positive and mild truncal ataxia on ambulation. In the ED found to have uncontrolled hypertension and hyperglycemia. Patient appears to have some evidence of peripheral neuropathy and possible localizing signs i.e pure sensory disturbance on the right concerning for a pure sensory lacunar syndrome vs a myelopathy    Recommendations  - CTH: left thalamic lacunar infarct. Slit-like hypodensity in the left striatal capsular region which may represent sequela of prior hemorrhage/infarct.  - CT cervical appreciated  - Obtain B12, Folate, TSH, serum electrophoresis and immunofixation  - CT Thoracic and Lumbar  - MR brain w/o cont    will follow

## 2023-06-02 NOTE — H&P ADULT - PROBLEM SELECTOR PLAN 5
Pt w/ hx of CAD with stent placement as well as loop recorder. Pt taking Eliquis 5mg q12h, Atorvastatin 80mg, but no DAPT therapy.  Plan:   - C/w Atorvastatin 80mg QD   - F/u with TTE with duplex studies Pt w/ hx of CAD with stent placement as well as loop recorder. Pt taking Eliquis 5mg q12h, Atorvastatin 80mg, but no DAPT therapy.  Plan:   - C/w Atorvastatin 80mg QD   - Stroke consulted to assess need for ASA and to r/o further ischemic CVA

## 2023-06-02 NOTE — H&P ADULT - PROBLEM SELECTOR PLAN 1
On examination, pt with slow gait and positive Rombergs. Pt denies diarrheal illness predisposing for GBS. Insidious onset of b/l lower and upper extremity weakness suspicious for diabetic neuropathy i/s/o poor medication compliance. CTH on 6/2 negative for acute CVA, though small vessel disease suggestive of chronic ischemia noted.   Plan:  - Neurology consulted, appreciate recs  - Will obtain B12, folate, RPR to r/o dorsal column process  - PT consult On examination, pt with slow gait and positive Rombergs. Pt denies diarrheal illness predisposing for GBS. Insidious onset of b/l lower and upper extremity weakness suspicious for diabetic neuropathy i/s/o poor medication compliance. CTH on 6/2 negative for acute CVA, though small vessel disease suggestive of chronic ischemia noted. Additionally, CTH noted to show a left thalamic lacunar infarct. Slit-like hypodensity in the left striatal capsular region (1:11), which may represent sequela of prior hemorrhage/infarct. Cannot rule out ischemic cause of weakness.   Plan:  - Neurology consulted, appreciate recs  - Will obtain B12, folate, RPR to r/o dorsal column process  - PT consult

## 2023-06-02 NOTE — ED ADULT TRIAGE NOTE - BP NONINVASIVE SYSTOLIC (MM HG)
Ongoing SW/CM Assessment/Plan of Care Note     See SW/CM flowsheets for goals and other objective data.    Patient/Family discharge goal (s):  Goal #1: Communication facilitated  Goal #2: Extended Care Facility discharge arranged  Goal #3: Transportation arranged or issues addressed    PT Recommendation:  Recommendation for Discharge: PT WI: Sub-acute nursing home, Less intensive rehab    OT Recommendation:  Recommendations for Discharge: OT WI: Sub-acute nursing home, Less intensive rehab    SLP Recommendation:       Disposition:  Planned Discharge Destination: Rehabilitation/Skilled Care([facility])    Progress note:   Chart reviewed.  Per MD, Oncology plan clarified and active treatment will be \"on hold\" pending completion of rehab course at subacute rehab facility.  Per MD, pt is medically stable for d/c today.  DC plan remains subacute rehab placement.  [facility] has denied pt admission.  [facility] confirms facility is able to meet patient's care needs and bed is available 10/8/19.  SW discussed d/c plan w/ pt's dtr, Karen Hathaway.  SW reiterated Medicare coverage guidelines for subacute rehab.  Pt does not have a supplemental policy.  Medicare will pay 20 days at 100% and up to a additional 80 days at 80%.  Should pt require subacute stay longer than 20 days, pt would be billed the 20% copay cost of placement.  Dtr confirming understanding of financial implications. Per dtr, she will discuss placement option w/ family and make a final decision.  Anticipate d/c 10/8 pending family decision.  SW will continue to monitor stay and facilitate d/c plan.  Pt/family agreeable to plan.  SUGAR Lentz    ADDENDUM:  1546  Per RN, pt has a PICC line that needs to be replaced.  RN spoke w/ Oncology and pt was receiving \"weekly\" blood transfusions prior to hospitalization, and plan is to continue transfusions if indicated by weekly labs.  SW left voicemail for [facility]  [place] regarding possibility of blood transfusions.  Await return call.  Updated pt's dtr.  SW will continue to monitor stay and facilitate d/c plan.  SUGAR Lentz       180

## 2023-06-02 NOTE — H&P ADULT - HISTORY OF PRESENT ILLNESS
Ms. Hendricks is a 64 y/o F with a PMHx of [place] Palsy, TIIDM, CVA (left side weak), CAD, HLD, HTN, Asthma, current smoker with a 52 pack year hx, brought for evaluation of worsening generalized weakness since 5/21. Pt states she has had a CVA in the past with residual L sided weakness in her leg and arm, however, pt began to notice continual weakness in both her upper and lower extremities. Pt states she was taken to an ER in [place] for pain in her L ankle and was d/c after no DVT was discovered. Since then, pt states she lost her balance upon getting out of bed 2x and presented to ER at [facility] 6/2 for further evaluation. Pt states she has had increasingly difficulty in using her wrists and ankles. Pt denies diarrheal illness previously, denies SOB and cough, palpitations and CP, headaches, fevers, chills, nausea, vomiting, diarrhea, constipation, dysuria, hematuria, hematochezia. Pt noted to have had some urinary hesitancy     ED Course:  ED Vitals: Initial: T 97.8, HR 70, /82, satting 99% on RA   Notable labs: WBC 7.98, Hgb/Hct 12.7/37.2, platelets 242; Na 137, K 4.7, Cl 105, CO2 24, BUN/Cr 31/1.70, Glucose 360, troponin 0.01   UA: Protein, small blood, RBC, bacteria, + Glucose   CXR: No evidence of acute cardiopulmonary disease  CTH: No acute intracranial hemorrhage, mass effect, or demarcated recent infarction, small vessel ischemic change throughout cerebral white matter and deep gray/white matter lacunae.  CT Cervical Spine: No fracture   EKG: NSR with L axis deviation and occasional Q waves but no active ischemic changes   Pt was admitted to [facility] for further workup of generalized weakness      	PMH:  Bell's palsy affecting right side, DM, CVA; Prior stroke residual: Left sided weakness, general feeling of being very cold, CAD, HLD, HTN, Aslthma  	All: codeine  	Soc:  CUrrent smoker  	Surg: Stents, appy, csection    	Meds: Lisniopril    	No new facial or unilateral weakness  	She has been SOB on and off and has mild swelling of her legs  	No CP, Abd pain, cough, fever, n,v,d  Incontinence of urine, but it is unclear if it is a loss of control or more so because she cannot get up in time to get to the bathroom Ms. Hendricks is a 64 y/o F with a PMHx of [place] Palsy, TIIDM, CVA (left side weak), CAD, HLD, HTN, Asthma, current smoker with a 52 pack year hx, brought for evaluation of worsening generalized weakness since 5/21. Pt states she has had a CVA in the past with residual L sided weakness in her leg and arm, however, pt began to notice continual weakness in both her upper and lower extremities. Pt states she was taken to an ER in [place] for pain in her L ankle and was d/c after no DVT was discovered. Since then, pt states she lost her balance upon getting out of bed 2x and presented to ER at [facility] 6/2 for further evaluation. Pt states she has had increasingly difficulty in using her wrists and ankles. Pt denies diarrheal illness previously, denies SOB and cough, palpitations and CP, headaches, fevers, chills, nausea, vomiting, diarrhea, constipation, dysuria, hematuria, hematochezia. Pt noted to have had some urinary hesitancy. Daughter Rosemarie at bedside [phone], states her mother has poor compliance with her medications, states she has mostly sedentary lifestyle.     ED Course:  ED Vitals: Initial: T 97.8, HR 70, /82, satting 99% on RA   Notable labs: WBC 7.98, Hgb/Hct 12.7/37.2, platelets 242; Na 137, K 4.7, Cl 105, CO2 24, BUN/Cr 31/1.70, Glucose 360, troponin 0.01   UA: Protein, small blood, RBC, bacteria, + Glucose   CXR: No evidence of acute cardiopulmonary disease  CTH: No acute intracranial hemorrhage, mass effect, or demarcated recent infarction, small vessel ischemic change throughout cerebral white matter and deep gray/white matter lacunae.  CT Cervical Spine: No fracture   EKG: NSR with L axis deviation and occasional Q waves but no active ischemic changes   Pt was admitted to [facility] for further workup of generalized weakness      	PMH:  Bell's palsy affecting right side, DM, CVA; Prior stroke residual: Left sided weakness, general feeling of being very cold, CAD, HLD, HTN, Aslthma  	All: codeine  	Soc:  CUrrent smoker  	Surg: Stents, appy, csection    	Meds: Lisniopril    	No new facial or unilateral weakness  	She has been SOB on and off and has mild swelling of her legs  	No CP, Abd pain, cough, fever, n,v,d  Incontinence of urine, but it is unclear if it is a loss of control or more so because she cannot get up in time to get to the bathroom Ms. Hendricks is a 62 y/o F with a PMHx of [place] Palsy (on the R side), TIIDM, CVA (left side weak), CAD, HLD, HTN, Asthma, current smoker with a 52 pack year hx, brought for evaluation of worsening generalized weakness since 5/21. Pt states she has had a CVA in the past with residual L sided weakness in her leg and arm, however, pt began to notice continual weakness in both her upper and lower extremities. Pt states she was taken to an ER in [place] for pain in her L ankle and was d/c after no DVT was discovered. Since then, pt states she lost her balance upon getting out of bed 2x and presented to ER at [facility] 6/2 for further evaluation. Pt states she has had increasingly difficulty in using her wrists and ankles. Pt denies diarrheal illness previously, denies SOB and cough, palpitations and CP, headaches, fevers, chills, nausea, vomiting, diarrhea, constipation, dysuria, hematuria, hematochezia. Pt noted to have had some urinary hesitancy. Daughter Rosemarie at bedside [phone], states her mother has poor compliance with her medications, states she has mostly sedentary lifestyle.     ED Course:  ED Vitals: Initial: T 97.8, HR 70, /82, satting 99% on RA   Notable labs: WBC 7.98, Hgb/Hct 12.7/37.2, platelets 242; Na 137, K 4.7, Cl 105, CO2 24, BUN/Cr 31/1.70, Glucose 360, troponin 0.01   UA: Protein, small blood, RBC, bacteria, + Glucose   CXR: No evidence of acute cardiopulmonary disease  CTH: No acute intracranial hemorrhage, mass effect, or demarcated recent infarction, small vessel ischemic change throughout cerebral white matter and deep gray/white matter lacunae.  CT Cervical Spine: No fracture   EKG: NSR with L axis deviation and occasional Q waves but no active ischemic changes   Pt was admitted to [facility] for further workup of generalized weakness Ms. Hendricks is a 62 y/o F with a PMHx of [place] Palsy (on the left side), TIIDM, CVA (left side weak), CAD, HLD, HTN, Asthma, current smoker with a 52 pack year hx, brought for evaluation of worsening generalized weakness since 5/21. Pt states she has had a CVA in the past with residual L sided weakness in her leg and arm, however, pt began to notice continual weakness in both her upper and lower extremities. Pt states she was taken to an ER in [place] for pain in her L ankle and was d/c after no DVT was discovered. Since then, pt states she lost her balance upon getting out of bed 2x and presented to ER at [facility] 6/2 for further evaluation. Pt states she has had increasingly difficulty in using her wrists and ankles. Pt denies diarrheal illness previously, denies SOB and cough, palpitations and CP, headaches, fevers, chills, nausea, vomiting, diarrhea, constipation, dysuria, hematuria, hematochezia. Pt noted to have had some urinary hesitancy. Daughter Rosemarie at bedside [phone], states her mother has poor compliance with her medications, states she has mostly sedentary lifestyle.     ED Course:  ED Vitals: Initial: T 97.8, HR 70, /82, satting 99% on RA   Notable labs: WBC 7.98, Hgb/Hct 12.7/37.2, platelets 242; Na 137, K 4.7, Cl 105, CO2 24, BUN/Cr 31/1.70, Glucose 360, troponin 0.01   UA: Protein, small blood, RBC, bacteria, + Glucose   CXR: No evidence of acute cardiopulmonary disease  CTH: No acute intracranial hemorrhage, mass effect, or demarcated recent infarction, small vessel ischemic change throughout cerebral white matter and deep gray/white matter lacunae.  CT Cervical Spine: No fracture   EKG: NSR with L axis deviation and occasional Q waves but no active ischemic changes   Pt was admitted to [facility] for further workup of generalized weakness

## 2023-06-03 ENCOUNTER — TRANSCRIPTION ENCOUNTER (OUTPATIENT)
Age: 63
End: 2023-06-03

## 2023-06-03 DIAGNOSIS — R32 UNSPECIFIED URINARY INCONTINENCE: ICD-10-CM

## 2023-06-03 DIAGNOSIS — I63.50 CEREBRAL INFARCTION DUE TO UNSPECIFIED OCCLUSION OR STENOSIS OF UNSPECIFIED CEREBRAL ARTERY: ICD-10-CM

## 2023-06-03 LAB
A1C WITH ESTIMATED AVERAGE GLUCOSE RESULT: 9.8 % — HIGH (ref 4–5.6)
ALBUMIN SERPL ELPH-MCNC: 3.2 G/DL — LOW (ref 3.3–5)
ALP SERPL-CCNC: 98 U/L — SIGNIFICANT CHANGE UP (ref 40–120)
ALT FLD-CCNC: 11 U/L — SIGNIFICANT CHANGE UP (ref 10–45)
ANION GAP SERPL CALC-SCNC: 7 MMOL/L — SIGNIFICANT CHANGE UP (ref 5–17)
ANION GAP SERPL CALC-SCNC: 9 MMOL/L — SIGNIFICANT CHANGE UP (ref 5–17)
AST SERPL-CCNC: 11 U/L — SIGNIFICANT CHANGE UP (ref 10–40)
BILIRUB SERPL-MCNC: 0.3 MG/DL — SIGNIFICANT CHANGE UP (ref 0.2–1.2)
BLD GP AB SCN SERPL QL: NEGATIVE — SIGNIFICANT CHANGE UP
BUN SERPL-MCNC: 24 MG/DL — HIGH (ref 7–23)
BUN SERPL-MCNC: 24 MG/DL — HIGH (ref 7–23)
CALCIUM SERPL-MCNC: 8.6 MG/DL — SIGNIFICANT CHANGE UP (ref 8.4–10.5)
CALCIUM SERPL-MCNC: 8.6 MG/DL — SIGNIFICANT CHANGE UP (ref 8.4–10.5)
CHLORIDE SERPL-SCNC: 109 MMOL/L — HIGH (ref 96–108)
CHLORIDE SERPL-SCNC: 110 MMOL/L — HIGH (ref 96–108)
CHOLEST SERPL-MCNC: 235 MG/DL — HIGH
CO2 SERPL-SCNC: 23 MMOL/L — SIGNIFICANT CHANGE UP (ref 22–31)
CO2 SERPL-SCNC: 25 MMOL/L — SIGNIFICANT CHANGE UP (ref 22–31)
CREAT SERPL-MCNC: 1.45 MG/DL — HIGH (ref 0.5–1.3)
CREAT SERPL-MCNC: 1.46 MG/DL — HIGH (ref 0.5–1.3)
EGFR: 40 ML/MIN/1.73M2 — LOW
EGFR: 40 ML/MIN/1.73M2 — LOW
ESTIMATED AVERAGE GLUCOSE: 235 MG/DL — HIGH (ref 68–114)
FOLATE SERPL-MCNC: 11.2 NG/ML — SIGNIFICANT CHANGE UP
GLUCOSE BLDC GLUCOMTR-MCNC: 140 MG/DL — HIGH (ref 70–99)
GLUCOSE BLDC GLUCOMTR-MCNC: 149 MG/DL — HIGH (ref 70–99)
GLUCOSE BLDC GLUCOMTR-MCNC: 177 MG/DL — HIGH (ref 70–99)
GLUCOSE BLDC GLUCOMTR-MCNC: 233 MG/DL — HIGH (ref 70–99)
GLUCOSE SERPL-MCNC: 151 MG/DL — HIGH (ref 70–99)
GLUCOSE SERPL-MCNC: 155 MG/DL — HIGH (ref 70–99)
HCT VFR BLD CALC: 39 % — SIGNIFICANT CHANGE UP (ref 34.5–45)
HCV AB S/CO SERPL IA: 0.04 S/CO — SIGNIFICANT CHANGE UP
HCV AB SERPL-IMP: SIGNIFICANT CHANGE UP
HDLC SERPL-MCNC: 41 MG/DL — LOW
HGB BLD-MCNC: 13 G/DL — SIGNIFICANT CHANGE UP (ref 11.5–15.5)
LIPID PNL WITH DIRECT LDL SERPL: 162 MG/DL — HIGH
MAGNESIUM SERPL-MCNC: 1.9 MG/DL — SIGNIFICANT CHANGE UP (ref 1.6–2.6)
MCHC RBC-ENTMCNC: 26.6 PG — LOW (ref 27–34)
MCHC RBC-ENTMCNC: 33.3 GM/DL — SIGNIFICANT CHANGE UP (ref 32–36)
MCV RBC AUTO: 79.9 FL — LOW (ref 80–100)
NON HDL CHOLESTEROL: 194 MG/DL — HIGH
NRBC # BLD: 0 /100 WBCS — SIGNIFICANT CHANGE UP (ref 0–0)
PHOSPHATE SERPL-MCNC: 3 MG/DL — SIGNIFICANT CHANGE UP (ref 2.5–4.5)
PLATELET # BLD AUTO: 249 K/UL — SIGNIFICANT CHANGE UP (ref 150–400)
POTASSIUM SERPL-MCNC: 4 MMOL/L — SIGNIFICANT CHANGE UP (ref 3.5–5.3)
POTASSIUM SERPL-MCNC: 4.2 MMOL/L — SIGNIFICANT CHANGE UP (ref 3.5–5.3)
POTASSIUM SERPL-SCNC: 4 MMOL/L — SIGNIFICANT CHANGE UP (ref 3.5–5.3)
POTASSIUM SERPL-SCNC: 4.2 MMOL/L — SIGNIFICANT CHANGE UP (ref 3.5–5.3)
PROT SERPL-MCNC: 6.1 G/DL — SIGNIFICANT CHANGE UP (ref 6–8.3)
RBC # BLD: 4.88 M/UL — SIGNIFICANT CHANGE UP (ref 3.8–5.2)
RBC # FLD: 14.3 % — SIGNIFICANT CHANGE UP (ref 10.3–14.5)
RH IG SCN BLD-IMP: POSITIVE — SIGNIFICANT CHANGE UP
SODIUM SERPL-SCNC: 141 MMOL/L — SIGNIFICANT CHANGE UP (ref 135–145)
SODIUM SERPL-SCNC: 142 MMOL/L — SIGNIFICANT CHANGE UP (ref 135–145)
TRIGL SERPL-MCNC: 158 MG/DL — HIGH
TSH SERPL-MCNC: 2 UIU/ML — SIGNIFICANT CHANGE UP (ref 0.27–4.2)
VIT B12 SERPL-MCNC: 447 PG/ML — SIGNIFICANT CHANGE UP (ref 232–1245)
WBC # BLD: 7.48 K/UL — SIGNIFICANT CHANGE UP (ref 3.8–10.5)
WBC # FLD AUTO: 7.48 K/UL — SIGNIFICANT CHANGE UP (ref 3.8–10.5)

## 2023-06-03 PROCEDURE — 70544 MR ANGIOGRAPHY HEAD W/O DYE: CPT | Mod: 26,59

## 2023-06-03 PROCEDURE — 70547 MR ANGIOGRAPHY NECK W/O DYE: CPT | Mod: 26

## 2023-06-03 PROCEDURE — 72141 MRI NECK SPINE W/O DYE: CPT | Mod: 26

## 2023-06-03 PROCEDURE — 99223 1ST HOSP IP/OBS HIGH 75: CPT

## 2023-06-03 PROCEDURE — 72148 MRI LUMBAR SPINE W/O DYE: CPT | Mod: 26

## 2023-06-03 PROCEDURE — 99233 SBSQ HOSP IP/OBS HIGH 50: CPT

## 2023-06-03 PROCEDURE — 72146 MRI CHEST SPINE W/O DYE: CPT | Mod: 26

## 2023-06-03 PROCEDURE — 70551 MRI BRAIN STEM W/O DYE: CPT | Mod: 26

## 2023-06-03 RX ORDER — HYDRALAZINE HCL 50 MG
5 TABLET ORAL ONCE
Refills: 0 | Status: COMPLETED | OUTPATIENT
Start: 2023-06-03 | End: 2023-06-03

## 2023-06-03 RX ORDER — CLOPIDOGREL BISULFATE 75 MG/1
75 TABLET, FILM COATED ORAL DAILY
Refills: 0 | Status: DISCONTINUED | OUTPATIENT
Start: 2023-06-03 | End: 2023-06-05

## 2023-06-03 RX ORDER — HEPARIN SODIUM 5000 [USP'U]/ML
7500 INJECTION INTRAVENOUS; SUBCUTANEOUS EVERY 8 HOURS
Refills: 0 | Status: DISCONTINUED | OUTPATIENT
Start: 2023-06-03 | End: 2023-06-03

## 2023-06-03 RX ORDER — APIXABAN 2.5 MG/1
5 TABLET, FILM COATED ORAL EVERY 12 HOURS
Refills: 0 | Status: DISCONTINUED | OUTPATIENT
Start: 2023-06-03 | End: 2023-06-06

## 2023-06-03 RX ORDER — HYDRALAZINE HCL 50 MG
10 TABLET ORAL ONCE
Refills: 0 | Status: COMPLETED | OUTPATIENT
Start: 2023-06-03 | End: 2023-06-03

## 2023-06-03 RX ORDER — LISINOPRIL 2.5 MG/1
20 TABLET ORAL DAILY
Refills: 0 | Status: DISCONTINUED | OUTPATIENT
Start: 2023-06-03 | End: 2023-06-04

## 2023-06-03 RX ORDER — INSULIN LISPRO 100/ML
3 VIAL (ML) SUBCUTANEOUS
Refills: 0 | Status: DISCONTINUED | OUTPATIENT
Start: 2023-06-03 | End: 2023-06-05

## 2023-06-03 RX ORDER — ACETAMINOPHEN 500 MG
650 TABLET ORAL EVERY 6 HOURS
Refills: 0 | Status: DISCONTINUED | OUTPATIENT
Start: 2023-06-03 | End: 2023-06-13

## 2023-06-03 RX ADMIN — INSULIN GLARGINE 10 UNIT(S): 100 INJECTION, SOLUTION SUBCUTANEOUS at 21:25

## 2023-06-03 RX ADMIN — Medication 50 MILLIGRAM(S): at 05:12

## 2023-06-03 RX ADMIN — CLOPIDOGREL BISULFATE 75 MILLIGRAM(S): 75 TABLET, FILM COATED ORAL at 02:36

## 2023-06-03 RX ADMIN — Medication 10 MILLIGRAM(S): at 03:21

## 2023-06-03 RX ADMIN — AMLODIPINE BESYLATE 10 MILLIGRAM(S): 2.5 TABLET ORAL at 05:12

## 2023-06-03 RX ADMIN — LISINOPRIL 20 MILLIGRAM(S): 2.5 TABLET ORAL at 15:03

## 2023-06-03 RX ADMIN — Medication 3 UNIT(S): at 16:50

## 2023-06-03 RX ADMIN — Medication 2: at 16:50

## 2023-06-03 RX ADMIN — APIXABAN 5 MILLIGRAM(S): 2.5 TABLET, FILM COATED ORAL at 01:18

## 2023-06-03 RX ADMIN — Medication 10 MILLIGRAM(S): at 12:45

## 2023-06-03 RX ADMIN — ATORVASTATIN CALCIUM 80 MILLIGRAM(S): 80 TABLET, FILM COATED ORAL at 21:25

## 2023-06-03 RX ADMIN — Medication 4: at 11:34

## 2023-06-03 RX ADMIN — Medication 5 MILLIGRAM(S): at 07:03

## 2023-06-03 RX ADMIN — APIXABAN 5 MILLIGRAM(S): 2.5 TABLET, FILM COATED ORAL at 18:28

## 2023-06-03 RX ADMIN — CLOPIDOGREL BISULFATE 75 MILLIGRAM(S): 75 TABLET, FILM COATED ORAL at 11:27

## 2023-06-03 RX ADMIN — Medication 650 MILLIGRAM(S): at 01:18

## 2023-06-03 RX ADMIN — Medication 650 MILLIGRAM(S): at 02:25

## 2023-06-03 NOTE — SWALLOW BEDSIDE ASSESSMENT ADULT - SLP GENERAL OBSERVATIONS
Pt was seen fully awake and alert, HOB fully elevated, on room air. A&Ox3, followed simple directives, and communicated wants/needs. Motor speech exam c/w with regular pattern, slow rate, and imprecise speech. Dysphonia noted most c/w strained and soft vocal quality, <15 sustained phonation (6 sec). Motor speech exam+vocal quality most c/w mixed dysarthria of Flaccid+Spastic dysarthria. Pt was seen fully awake and alert, HOB fully elevated, on room air. A&Ox3, followed simple directives, and communicated wants/needs. Motor speech exam c/w with regular pattern, slow rate, and imprecise speech. No resonance disturbance. Dysphonia noted most c/w strained and soft vocal quality, <15 sustained phonation (6 sec). Motor speech exam+vocal quality most c/w mixed dysarthria of Flaccid+Spastic dysarthria.

## 2023-06-03 NOTE — CONSULT NOTE ADULT - PROBLEM SELECTOR RECOMMENDATION 9
# Type 2 diabetes mellitus - uncontrolled   -likely medication non-compliance  Home regimen:  - Please continue lantus *** units at bedtime.   - Continue lispro *** units before each meal.  - Continue lispro moderate dose sliding scale four times daily with meals and at bedtime.  - Patient's fingerstick glucose goal is 100-180 mg/dL.    - For discharge, patient can ***.    - Patient can follow up at discharge with [facility] by calling [phone] to make an appointment. # Type 2 diabetes mellitus - uncontrolled   -likely medication non-compliance vs adjustment needed   Home regimen: 75/25 insulin 20 units QD, Metformin 1000mg BID and Rebelsys 3mg QD  - Please continue lantus 10 units at bedtime.   - Start lispro 3 units before each meal.  - Continue lispro moderate dose sliding scale four times daily with meals and at bedtime.  - Patient's fingerstick glucose goal is 100-180 mg/dL.    - For discharge, patient can TBD  - Patient to follow up with physician's in [place], has new endocrine appointment

## 2023-06-03 NOTE — DISCHARGE NOTE PROVIDER - NSDCMRMEDTOKEN_GEN_ALL_CORE_FT
amLODIPine 10 mg oral tablet: 1 tab(s) orally once a day  atorvastatin 80 mg oral tablet: 1 tab(s) orally once a day  Eliquis 5 mg oral tablet: 1 tab(s) orally 2 times a day  insulin lispro 100 units/mL injectable solution: 25 unit(s) injectable once a day  lisinopril 40 mg oral tablet: 1 tab(s) orally once a day  memantine 5 mg oral tablet: 1 tab(s) orally once a day  metFORMIN 1000 mg oral tablet: 1 tab(s) orally 2 times a day  metoprolol succinate 50 mg oral capsule, extended release: 1 tab(s) orally once a day  Rybelsus 3 mg oral tablet: 1 tab(s) orally once a day   amLODIPine 10 mg oral tablet: 1 tab(s) orally once a day  atorvastatin 80 mg oral tablet: 1 tab(s) orally once a day  Eliquis 5 mg oral tablet: 1 tab(s) orally 2 times a day  Freestyle Ted 2 [place]: Dispense 1 [place]  Freestyle Ted 2 sensors: Apply 1 sensor every 14 days  insulin lispro 100 units/mL injectable solution: 25 unit(s) injectable once a day  lisinopril 40 mg oral tablet: 1 tab(s) orally once a day  memantine 5 mg oral tablet: 1 tab(s) orally once a day  metFORMIN 1000 mg oral tablet: 1 tab(s) orally 2 times a day  metoprolol succinate 50 mg oral capsule, extended release: 1 tab(s) orally once a day  Rybelsus 3 mg oral tablet: 1 tab(s) orally once a day   amLODIPine 10 mg oral tablet: 1 tab(s) orally once a day  aspirin 81 mg oral delayed release tablet: 1 tab(s) orally once a day  atorvastatin 80 mg oral tablet: 1 tab(s) orally once a day (at bedtime)  carvedilol 12.5 mg oral tablet: 1 tab(s) orally every 12 hours  clopidogrel 75 mg oral tablet: 1 tab(s) orally once a day  donepezil 5 mg oral tablet: 1 tab(s) orally once a day (at bedtime)  Freestyle Ted 2 [place]: Dispense 1 [place]  Freestyle Ted 2 sensors: Apply 1 sensor every 14 days  lisinopril 40 mg oral tablet: 1 tab(s) orally once a day  memantine 5 mg oral tablet: 1 tab(s) orally once a day  metFORMIN 1000 mg oral tablet: 1 tab(s) orally 2 times a day  Rybelsus 3 mg oral tablet: 1 tab(s) orally once a day   amLODIPine 10 mg oral tablet: 1 tab(s) orally once a day  aspirin 81 mg oral delayed release tablet: 1 tab(s) orally once a day  atorvastatin 80 mg oral tablet: 1 tab(s) orally once a day (at bedtime)  carvedilol 12.5 mg oral tablet: 1 tab(s) orally every 12 hours  clopidogrel 75 mg oral tablet: 1 tab(s) orally once a day  Freestyle Ted 2 [place]: Dispense 1 [place]  Freestyle Ted 2 sensors: Apply 1 sensor every 14 days  lidocaine 4% topical film: Apply topically to affected area every 24 hours as needed for  mild pain Please apply one patch to right knee and on patch to left ankle for pain  lidocaine 4% topical film: Apply topically to affected area  lisinopril 40 mg oral tablet: 1 tab(s) orally once a day  memantine 5 mg oral tablet: 1 tab(s) orally once a day  metFORMIN 1000 mg oral tablet: 1 tab(s) orally 2 times a day  Rybelsus 3 mg oral tablet: 1 tab(s) orally once a day   amLODIPine 10 mg oral tablet: 1 tab(s) orally once a day  aspirin 81 mg oral delayed release tablet: 1 tab(s) orally once a day Please stop after 21 days  atorvastatin 80 mg oral tablet: 1 tab(s) orally once a day (at bedtime)  carvedilol 12.5 mg oral tablet: 1 tab(s) orally every 12 hours  clopidogrel 75 mg oral tablet: 1 tab(s) orally once a day  clopidogrel 75 mg oral tablet: 1 tab(s) orally once a day  Freestyle Ted 2 [place]: Dispense 1 [place]  Freestyle Ted 2 sensors: Apply 1 sensor every 14 days  furosemide 20 mg oral tablet: 1 tab(s) orally once a day  lidocaine 4% topical film: Apply topically to affected area every 24 hours as needed for  mild pain Please apply one patch to right knee and on patch to left ankle for pain  lidocaine 4% topical film: Apply topically to affected area every 24 hours as needed for  mild pain Apply to right knee and right ankle for pain  lisinopril 40 mg oral tablet: 1 tab(s) orally once a day  memantine 5 mg oral tablet: 1 tab(s) orally once a day  metFORMIN 1000 mg oral tablet: 1 tab(s) orally 2 times a day  Rybelsus 3 mg oral tablet: 1 tab(s) orally once a day

## 2023-06-03 NOTE — PROGRESS NOTE ADULT - PROBLEM SELECTOR PLAN 3
takes Lisinopril 40mg qd, Amlodipine 10mg qd, and Metoprolol 50mg qd  Plan:  - c/w Amlodipine 10mg with hold parameters   - Metoprolol 50mg QD with hold parameters  - Holding Lisinopril 40mg i/s/o JUAN JOSÉ as discussed below

## 2023-06-03 NOTE — PATIENT PROFILE ADULT - FALL HARM RISK - HARM RISK INTERVENTIONS

## 2023-06-03 NOTE — CONSULT NOTE ADULT - SUBJECTIVE AND OBJECTIVE BOX
HISTORY OF PRESENT ILLNESS  64 y/o F with a PMHx of [place] Palsy (on the left side), TIIDM, CVA (left side weak), CAD, HLD, HTN, Asthma, current smoker with a 52 pack year hx, brought for evaluation of worsening generalized weakness since 5/21. Pt states she has had a CVA in the past with residual L sided weakness in her leg and arm, however, pt began to notice continual weakness in both her upper and lower extremities. Pt states she was taken to an ER in [place] for pain in her L ankle and was d/c after no DVT was discovered. Since then, pt states she lost her balance upon getting out of bed 2x and presented to ER at [facility] 6/2 for further evaluation. Pt states she has had increasingly difficulty in using her wrists and ankles. Pt denies diarrheal illness previously, denies SOB and cough, palpitations and CP, headaches, fevers, chills, nausea, vomiting, diarrhea, constipation, dysuria, hematuria, hematochezia. Pt noted to have had some urinary hesitancy. Daughter Rosemarie at bedside [phone], states her mother has poor compliance with her medications, states she has mostly sedentary lifestyle.     ED Course:  ED Vitals: Initial: T 97.8, HR 70, /82, satting 99% on RA   Notable labs: WBC 7.98, Hgb/Hct 12.7/37.2, platelets 242; Na 137, K 4.7, Cl 105, CO2 24, BUN/Cr 31/1.70, Glucose 360, troponin 0.01   UA: Protein, small blood, RBC, bacteria, + Glucose   CXR: No evidence of acute cardiopulmonary disease  CTH: No acute intracranial hemorrhage, mass effect, or demarcated recent infarction, small vessel ischemic change throughout cerebral white matter and deep gray/white matter lacunae.  CT Cervical Spine: No fracture   EKG: NSR with L axis deviation and occasional Q waves but no active ischemic changes   Pt was admitted to [facility] for further workup of generalized weakness  MRI with short stroke protocol was able to be completed which revealed a small right pontine stroke.    Endocrinology consulted for elevated A1c of 9.8% and glucose management in setting of acute CVA.    DIABETES HISTORY  - Age at diagnosis:   - Symptoms at time of diagnosis:   - Current Therapy:  - History of other regimens:   - History of hypoglycemia:   - History of DKA/HHS:   - Complications:   - Home FSG:        > Fasting: *** mg/dL.        > Before meals: *** mg/dL.        > Bedtime: *** mg/dL.  - Diet:          > Breakfast:         > Lunch:        > Dinner:        > Snacks:  - Physical activity:    - Outpatient follow-up:     PAST MEDICAL & SURGICAL HISTORY  As per history of present illness.     FAMILY HISTORY  - Diabetes:  - Thyroid:  - Autoimmune:  - Other:    SOCIAL HISTORY  - Work:  - Alcohol:  - Smoking:  - Recreational Drugs:    ALLERGIES  codeine (Unknown)    CURRENT MEDICATIONS  acetaminophen     Tablet .. 650 milliGRAM(s) Oral every 6 hours PRN  amLODIPine   Tablet 10 milliGRAM(s) Oral daily  apixaban 5 milliGRAM(s) Oral every 12 hours  atorvastatin 80 milliGRAM(s) Oral at bedtime  clopidogrel Tablet 75 milliGRAM(s) Oral daily  dextrose 5%. 1000 milliLiter(s) IV Continuous <Continuous>  dextrose 5%. 1000 milliLiter(s) IV Continuous <Continuous>  dextrose 50% Injectable 25 Gram(s) IV Push once  dextrose 50% Injectable 25 Gram(s) IV Push once  dextrose 50% Injectable 12.5 Gram(s) IV Push once  dextrose Oral Gel 15 Gram(s) Oral once PRN  glucagon  Injectable 1 milliGRAM(s) IntraMuscular once  insulin glargine Injectable (LANTUS) 10 Unit(s) SubCutaneous at bedtime  insulin lispro (ADMELOG) corrective regimen sliding scale   SubCutaneous Before meals and at bedtime  metoprolol succinate ER 50 milliGRAM(s) Oral daily    REVIEW OF SYSTEMS  Constitutional:  Negative fever, chills or loss of appetite.  Eyes:  Negative blurry vision or double vision.  Cardiovascular:  Negative for chest pain or palpitations.  Respiratory:  Negative for cough, wheezing, or shortness of breath.   Gastrointestinal:  Negative for nausea, vomiting, diarrhea, constipation, or abdominal pain.  Genitourinary:  Negative frequency, urgency or dysuria.  Neurologic:  No headache, confusion, dizziness, lightheadedness.    PHYSICAL EXAM  Vital Signs Last 24 Hrs  T(C): 36.8 (03 Jun 2023 09:10), Max: 36.8 (02 Jun 2023 19:00)  T(F): 98.2 (03 Jun 2023 09:10), Max: 98.2 (02 Jun 2023 19:00)  HR: 68 (03 Jun 2023 08:50) (66 - 86)  BP: 177/77 (03 Jun 2023 08:50) (133/66 - 204/87)  BP(mean): 111 (03 Jun 2023 08:50) (100 - 111)  RR: 20 (03 Jun 2023 08:50) (16 - 22)  SpO2: 97% (03 Jun 2023 08:50) (94% - 100%)    Parameters below as of 03 Jun 2023 08:50  Patient On (Oxygen Delivery Method): room air    Constitutional: Awake, alert, in no acute distress.   HEENT: Normocephalic, atraumatic, ANDREA, no proptosis or lid retraction.   Neck: supple, no acanthosis, no thyromegaly or palpable thyroid nodules.  Respiratory: Lungs clear to ausculation bilaterally.   Cardiovascular: regular rhythm, normal S1 and S2, no audible murmurs.   GI: soft, non-tender, non-distended, bowel sounds present, no masses appreciated.  Extremities: No lower extremity edema, peripheral pulses present.   Skin: no rashes.   Psychiatric: AAO x 3. Normal affect/mood.     LABS  CBC - WBC/HGB/HTC/PLT: 7.48/13.0/39.0/249 (06-03-23)  BMP: Na/K/Cl/Bicarb/BUN/Cr/Gluc: 141/4.0/109/23/24/1.46/155 (06-03-23)  Anion Gap: 9 (06-03-23)  eGFR: 40 (06-03-23)  Calcium: 8.6 (06-03-23)  Phosphorus: 3.0 (06-03-23)  Magnesium: 1.9 (06-03-23)  LFT - Alb/Tprot/Tbili/Dbili/AlkPhos/ALT/AST: 3.2/--/0.3/--/98/11/11 (06-03-23)        149 mg/dL (06-03 @ 06:41)  336 mg/dL (06-02 @ 22:20)               HISTORY OF PRESENT ILLNESS  62 y/o F with a PMHx of [place] Palsy (on the left side), TIIDM, CVA (left side weak), CAD, HLD, HTN, Asthma, current smoker with a 52 pack year hx, brought for evaluation of worsening generalized weakness since . Pt states she has had a CVA in the past with residual L sided weakness in her leg and arm, however, pt began to notice continual weakness in both her upper and lower extremities. Pt states she was taken to an ER in [place] for pain in her L ankle and was d/c after no DVT was discovered. Since then, pt states she lost her balance upon getting out of bed 2x and presented to ER at [facility]  for further evaluation. Pt states she has had increasingly difficulty in using her wrists and ankles. Pt denies diarrheal illness previously, denies SOB and cough, palpitations and CP, headaches, fevers, chills, nausea, vomiting, diarrhea, constipation, dysuria, hematuria, hematochezia. Pt noted to have had some urinary hesitancy. Daughter Rosemarie at bedside [phone], states her mother has poor compliance with her medications, states she has mostly sedentary lifestyle.     ED Course:  ED Vitals: Initial: T 97.8, HR 70, /82, satting 99% on RA   Notable labs: WBC 7.98, Hgb/Hct 12.7/37.2, platelets 242; Na 137, K 4.7, Cl 105, CO2 24, BUN/Cr 31/1.70, Glucose 360, troponin 0.01   UA: Protein, small blood, RBC, bacteria, + Glucose   CXR: No evidence of acute cardiopulmonary disease  CTH: No acute intracranial hemorrhage, mass effect, or demarcated recent infarction, small vessel ischemic change throughout cerebral white matter and deep gray/white matter lacunae.  CT Cervical Spine: No fracture   EKG: NSR with L axis deviation and occasional Q waves but no active ischemic changes   Pt was admitted to [facility] for further workup of generalized weakness  MRI with short stroke protocol was able to be completed which revealed a small right pontine stroke.    Endocrinology consulted for elevated A1c of 9.8% and glucose management in setting of acute CVA.    DIABETES HISTORY   diagnosis: Diagnosed over 20 years ago, reports was only on metformin and later insulin was added on.   - Current Therapy: 25/75 mixed insulin 20 units before breakfast. Metformin 1000mg BID and Rebelsys 3mg QD  - History of hypoglycemia: Denies symptoms of low sugars but rarely checks fingersticks   - History of DKA/HHS: denies   - Complications: neuropathy   - Diet:  states she does not have good taste or smell so does not eat much. reports losing weight lately especially over the last month after starting Rebelsys   - Physical activity: with left sided weakness, walks with walker for assistance. limits activity   - Outpatient follow-up: PCP, has appt with endocrinologist this month in [place].     PAST MEDICAL & SURGICAL HISTORY  As per history of present illness.     FAMILY HISTORY  - Diabetes: no family history of diabetes or thyroid disease     SOCIAL HISTORY  - Work: retired  - Alcohol: denies  - Smokin/2 pack cigarettes daily over 20 years   - Recreational Drugs: denies     ALLERGIES  codeine (Unknown)    CURRENT MEDICATIONS  acetaminophen     Tablet .. 650 milliGRAM(s) Oral every 6 hours PRN  amLODIPine   Tablet 10 milliGRAM(s) Oral daily  apixaban 5 milliGRAM(s) Oral every 12 hours  atorvastatin 80 milliGRAM(s) Oral at bedtime  clopidogrel Tablet 75 milliGRAM(s) Oral daily  dextrose 5%. 1000 milliLiter(s) IV Continuous <Continuous>  dextrose 5%. 1000 milliLiter(s) IV Continuous <Continuous>  dextrose 50% Injectable 25 Gram(s) IV Push once  dextrose 50% Injectable 25 Gram(s) IV Push once  dextrose 50% Injectable 12.5 Gram(s) IV Push once  dextrose Oral Gel 15 Gram(s) Oral once PRN  glucagon  Injectable 1 milliGRAM(s) IntraMuscular once  insulin glargine Injectable (LANTUS) 10 Unit(s) SubCutaneous at bedtime  insulin lispro (ADMELOG) corrective regimen sliding scale   SubCutaneous Before meals and at bedtime  metoprolol succinate ER 50 milliGRAM(s) Oral daily    REVIEW OF SYSTEMS  Constitutional:  Negative fever, chills, Reports poor appetite.  Eyes:  Negative blurry vision or double vision.  Cardiovascular:  Negative for chest pain or palpitations.  Respiratory:  Negative for cough, wheezing, or shortness of breath.   Gastrointestinal:  Negative for nausea, vomiting, diarrhea, constipation, or abdominal pain.  Genitourinary:  Negative frequency, urgency or dysuria.  Neurologic:  No headache, confusion, dizziness, lightheadedness. +weakness     PHYSICAL EXAM  Vital Signs Last 24 Hrs  T(C): 36.8 (2023 09:10), Max: 36.8 (2023 19:00)  T(F): 98.2 (2023 09:10), Max: 98.2 (2023 19:00)  HR: 68 (2023 08:50) (66 - 86)  BP: 177/77 (2023 08:50) (133/66 - 204/87)  BP(mean): 111 (2023 08:50) (100 - 111)  RR: 20 (2023 08:50) (16 - 22)  SpO2: 97% (2023 08:50) (94% - 100%)    Parameters below as of 2023 08:50  Patient On (Oxygen Delivery Method): room air    Constitutional: Awake, alert, in no acute distress.   HEENT: Normocephalic, atraumatic, left sided drooping notes (bell's)  Neck: supple, no acanthosis, no thyromegaly or palpable thyroid nodules.  Respiratory: Lungs clear to ausculation bilaterally.   Cardiovascular: regular rhythm, normal S1 and S2, no audible murmurs.   GI: soft, non-tender, non-distended, bowel sounds present, no masses appreciated.  Extremities: trace peripheral edema, peripheral pulses present.   Skin: no rashes.   Psychiatric: AAO x 3. Normal affect/mood.     LABS  CBC - WBC/HGB/HTC/PLT: 7.48/13.0/39.0/249 (23)  BMP: Na/K/Cl/Bicarb/BUN/Cr/Gluc: 141/4.0/109/23/24/1.46/155 (23)  Anion Gap: 9 (23)  eGFR: 40 (23)  Calcium: 8.6 (23)  Phosphorus: 3.0 (23)  Magnesium: 1.9 (23)  LFT - Alb/Tprot/Tbili/Dbili/AlkPhos/ALT/AST: 3.2/--/0.3/--/98/11/11 (23)        149 mg/dL ( @ 06:41)  336 mg/dL ( @ 22:20)

## 2023-06-03 NOTE — DISCHARGE NOTE PROVIDER - PROVIDER TOKENS
FREE:[LAST:[Олег],FIRST:[Kaylin],PHONE:[phone]],FAX:[phone]],ADDRESS:[Primary Care  [address] [place] [address]],FOLLOWUP:[2 months]] FREE:[LAST:[Denney],FIRST:[Kaylin],PHONE:[phone]],FAX:[phone]],ADDRESS:[Primary Care  [address] [place] [address]],FOLLOWUP:[2 months]],PROVIDER:[TOKEN:[[id number]]] PROVIDER:[TOKEN:[[id number]]],PROVIDER:[TOKEN:[[id number]],FOLLOWUP:[2 weeks]],FREE:[LAST:[Denney],FIRST:[Kaylin],PHONE:[phone]],FAX:[phone]],ADDRESS:[Primary [place] [address]],FOLLOWUP:[2 months]],PROVIDER:[TOKEN:[[id number]],FOLLOWUP:[2 weeks]] PROVIDER:[TOKEN:[[id number]]],PROVIDER:[TOKEN:[[id number]],FOLLOWUP:[2 weeks]],PROVIDER:[TOKEN:[[id number]],FOLLOWUP:[2 weeks]],FREE:[LAST:[Denney],FIRST:[Kaylin],PHONE:[phone]],FAX:[phone]],ADDRESS:[Primary Care  [address] [place] [address]],FOLLOWUP:[2 months]],PROVIDER:[TOKEN:[[id number]]] PROVIDER:[TOKEN:[[id number]]],PROVIDER:[TOKEN:[[id number]],FOLLOWUP:[2 weeks]],PROVIDER:[TOKEN:[[id number]],FOLLOWUP:[2 weeks]],PROVIDER:[TOKEN:[[id number]]],FREE:[LAST:[Denney],FIRST:[Dameel],PHONE:[phone]],FAX:[phone]],ADDRESS:[Primary Care  [address] [place] [address]],FOLLOWUP:[2 months]],PROVIDER:[TOKEN:[[id number]]] PROVIDER:[TOKEN:[[id number]]],PROVIDER:[TOKEN:[[id number]],FOLLOWUP:[2 weeks]],PROVIDER:[TOKEN:[[id number]],FOLLOWUP:[2 weeks]],PROVIDER:[TOKEN:[[id number]]],PROVIDER:[TOKEN:[[id number]]],FREE:[LAST:[Denney],FIRST:[Kaylin],PHONE:[phone]],FAX:[phone]],ADDRESS:[Primary Care  [address] [place] [address]],FOLLOWUP:[1-3 days]]

## 2023-06-03 NOTE — PROGRESS NOTE ADULT - PROBLEM SELECTOR PLAN 6
Pt w/ hx of CAD with stent placement as well as loop recorder. Pt taking Eliquis 5mg q12h, Atorvastatin 80mg, but no DAPT therapy.  Plan:   - C/w Atorvastatin 80mg QD Pt with hx of CVA last year 2022 with residual L sided weakness. Pt also with questionable hx of PVD, was seen by podiatrist on 5/21 who mentioned pt had diminished flow in her feet. No calf tenderness on exam. CTH non contrast on 6/2 showed no acute intracranial hemorrhage, mass effect, or demarcated recent infarction and small vessel ischemic change throughout cerebral white matter and deep gray/white matter lacunae.  Plan:   - Will c/w DVT ppx with Heparin SubQ as discussed below

## 2023-06-03 NOTE — PROGRESS NOTE ADULT - PROBLEM SELECTOR PLAN 7
Pt w/ hx of CAD with stent placement as well as loop recorder. Pt taking Eliquis 5mg q12h, Atorvastatin 80mg, but no DAPT therapy.  Plan:   - C/w Atorvastatin 80mg QD F: Tolerating oral   GI: None  DVT: on eliquis  Code: Full code  Dispo: pending PT eval Paramedian Forehead Flap Text: A decision was made to reconstruct the defect utilizing an interpolation axial flap and a staged reconstruction.  A telfa template was made of the defect.  This telfa template was then used to outline the paramedian forehead pedicle flap.  The donor area for the pedicle flap was then injected with anesthesia.  The flap was excised through the skin and subcutaneous tissue down to the layer of the underlying musculature.  The pedicle flap was carefully excised within this deep plane to maintain its blood supply.  The edges of the donor site were undermined.   The donor site was closed in a primary fashion.  The pedicle was then rotated into position and sutured.  Once the tube was sutured into place, adequate blood supply was confirmed with blanching and refill.  The pedicle was then wrapped with xeroform gauze and dressed appropriately with a telfa and gauze bandage to ensure continued blood supply and protect the attached pedicle.

## 2023-06-03 NOTE — PROGRESS NOTE ADULT - PROBLEM SELECTOR PLAN 5
Pt with hx of DM, taking Lispro 25U once a day at home. Pt's daughter Rosemarie states she believes her mother was told to take her insulin more frequently. Pt also taking Metformin 1g BID. Pt with Glucose of 360 in ED on admission, UA showed 500 glucose and proteinuria.   Plan:   - Pt with A1C of 9.8  - mISS inpatient with consistent carb diet  - Lantus 10U started inpatient, reassess pre-meal insulin requirements  - Consider Endocrine consult in AM Pt w/ hx of CAD with stent placement as well as loop recorder. Pt taking Eliquis 5mg q12h, Atorvastatin 80mg, but no DAPT therapy.  Plan:   - C/w Atorvastatin 80mg QD

## 2023-06-03 NOTE — DISCHARGE NOTE PROVIDER - CARE PROVIDER_API CALL
Kaylin Denney  Primary Care  [address] [place] [address]  Phone: [phone]  Fax: [phone]  Follow Up Time: 2 months   Kaylin Denney  Primary Care  [address] [address]  Phone: [phone]  Fax: [phone]  Follow Up Time: 2 months    Kyle Martinez  Neurology  [address] [address]  Phone: [phone]  Fax: [phone]  Follow Up Time:    Kyle Martinez  Neurology  [address] [address]  Phone: [phone]  Fax: [phone]  Follow Up Time:     Jennifer Hernandez  Nephrology  [address]  [place] [address]  Phone: [phone]  Fax: [phone]  Follow Up Time: 2 weeks    Kaylin Denney  Primary Care  [address] [address]  Phone: [phone]  Fax: [phone]  Follow Up Time: 2 months    Bruce Cary  Endocrinology/Metab/Diabetes  [address] [address]  Phone: [phone]  Fax: [phone]  Follow Up Time: 2 weeks   Kyle Martinez  Neurology  [address] [address]  Phone: [phone]  Fax: [phone]  Follow Up Time:     Jennifer Hernandez  Nephrology  [address]  [place] [address]  Phone: [phone]  Fax: [phone]  Follow Up Time: 2 weeks    Bruce Cary  Endocrinology/Metab/Diabetes  [address] [address]  Phone: [phone]  Fax: [phone]  Follow Up Time: 2 weeks    Kaylin Denney  Primary Care  [address] [address]  Phone: [phone]  Fax: [phone]  Follow Up Time: 2 months    Carissa Ortega  Otolaryngology  [address]  [place] [address]  Phone: [phone]  Fax: [phone]  Follow Up Time:    Kyle Martinez  Neurology  [address] [address]  Phone: [phone]  Fax: [phone]  Follow Up Time:     Jennifer Hernandez  Nephrology  [address]  [place] [address]  Phone: [phone]  Fax: [phone]  Follow Up Time: 2 weeks    Bruce Cary  Endocrinology/Metab/Diabetes  [address] [address]  Phone: [phone]  Fax: [phone]  Follow Up Time: 2 weeks    Carissa Ortega  Otolaryngology  [address]  [place] [address]  Phone: [phone]  Fax: [phone]  Follow Up Time:     Kaylin Denney  Primary Care  [address] [address]  Phone: [phone]  Fax: [phone]  Follow Up Time: 2 months    Dann Horta  Psychology  [address] [address]  Phone: [phone]  Fax: [phone]  Follow Up Time:    Kyle Martinez  Neurology  [address] [address]  Phone: [phone]  Fax: [phone]  Follow Up Time:     Jennifer Hernanedz  Nephrology  [address]  [place] [address]  Phone: [phone]  Fax: [phone]  Follow Up Time: 2 weeks    Bruce Cary  Endocrinology/Metab/Diabetes  [address] [place] [address] [address]  Phone: [phone]  Fax: [phone]  Follow Up Time: 2 weeks    Carissa Ortega  Otolaryngology  [address]  [place] [address]  Phone: [phone]  Fax: [phone]  Follow Up Time:     Dann Horta  Psychology  [address] [address]  Phone: [phone]  Fax: [phone]  Follow Up Time:     Kaylin Denney  Primary Care  [address] [address]  Phone: [phone]  Fax: [phone]  Follow Up Time: 1-3 days

## 2023-06-03 NOTE — SWALLOW BEDSIDE ASSESSMENT ADULT - ORAL PHASE
Impaired mastication and bolus manipulation with dry solids, improved with soft solids. Pt expectorated solids she was unable to masticate efficiently. Otherwise, functional oral clearance noted with thin liquids and purees.

## 2023-06-03 NOTE — PHYSICAL THERAPY INITIAL EVALUATION ADULT - GENERAL OBSERVATIONS, REHAB EVAL
Patient received seated in OOB chair in NAD on RA, +Telemetry, +Heplock. Cleared by ALLYSON Escobar. Agreeable to PT.

## 2023-06-03 NOTE — SWALLOW BEDSIDE ASSESSMENT ADULT - SWALLOW EVAL: DIAGNOSIS
Mildly prolonged oral phase, most notable with solids. Clinical indicators of penetration/aspiration noted with mixed consistency and delayed with consecutive sips of thin liquids, not replicated with single sips of thin liquids. No jose clinical indicators of penetration/aspiration noted with purees. Mild mixed dysarthria- flaccid+spastic dysarthria (strained voicing). Presentation c/w lower motor neuron involvement from Bell's palsy dx c/b R pontine infarct and prior CVA hx. Given newly found brainstem infarct and clinical presentation, recommend a modified barium swallow study to further assess swallowing physiology. Further dysphagia intervention pending objective assessment results. Prolonged oral phase, most notable with solids. Clinical indicators of penetration/aspiration noted with mixed consistency and delayed with consecutive sips of thin liquids, not replicated with single sips of thin liquids. No jose clinical indicators of penetration/aspiration noted with purees. Mild mixed dysarthria- flaccid+spastic dysarthria (strained voicing). Presentation c/w lower motor neuron involvement from Bell's palsy dx c/b R pontine infarct and prior CVA hx. Given newly found brainstem infarct and clinical presentation, recommend a modified barium swallow study to further assess swallowing physiology. Further dysphagia intervention pending objective assessment results. Prolonged oral phase, most notable with solids. Clinical indicators of penetration/aspiration noted with mixed consistency and delayed cough with consecutive sips of thin liquids, not replicated with single sips of thin liquids. No jose clinical indicators of penetration/aspiration noted with purees. Mild mixed dysarthria- flaccid+spastic dysarthria (strained voicing). Presentation c/w lower motor neuron involvement from Bell's palsy dx c/b R pontine infarct and prior CVA hx. Given newly found brainstem infarct and clinical presentation, recommend a modified barium swallow study to further assess swallowing physiology. Further dysphagia intervention pending objective assessment results.

## 2023-06-03 NOTE — PROGRESS NOTE ADULT - ASSESSMENT
Ms. Hendricks is a 64 y/o F with a PMHx of [place] Palsy, TIIDM, CVA (left side weak), CAD, HLD, HTN, Asthma, current smoker with a 52 pack year hx, brought for evaluation of worsening acute on chronic  weakness since 5/21, found to have a right pontine stroke, transferred to stroke tele for further management. Medicine following for comt

## 2023-06-03 NOTE — DISCHARGE NOTE PROVIDER - CARE PROVIDERS DIRECT ADDRESSES
,DirectAddress_Unknown ,DirectAddress_Unknown,DirectAddress_Unknown ,DirectAddress_Unknown,DirectAddress_Unknown,DirectAddress_Unknown,DirectAddress_Unknown ,DirectAddress_Unknown,DirectAddress_Unknown,DirectAddress_Unknown,DirectAddress_Unknown,DirectAddress_Unknown ,DirectAddress_Unknown,DirectAddress_Unknown,DirectAddress_Unknown,DirectAddress_Unknown,DirectAddress_Unknown,DirectAddress_Unknown

## 2023-06-03 NOTE — PROGRESS NOTE ADULT - PROBLEM SELECTOR PLAN 7
Pt with BUN/Cr of 31/1.70 on admission. Pt unsure of baseline Cr, states she was never told she had kidney problems before. UA showing proteinuria and glucosuria.   Plan:   - F/u with urine studies   - F/u bladder scan to r/o retention   - Continue to trend sCr, avoid nephrotoxic agents   - DVT ppx with Heparin SubQ Pt w/ hx of CAD with stent placement as well as loop recorder. Pt taking Eliquis 5mg q12h, Atorvastatin 80mg, but no DAPT therapy.  Plan:   - C/w Atorvastatin 80mg QD

## 2023-06-03 NOTE — DISCHARGE NOTE PROVIDER - NSDCCPCAREPLAN_GEN_ALL_CORE_FT
PRINCIPAL DISCHARGE DIAGNOSIS  Diagnosis: Stroke  Assessment and Plan of Treatment: During this hospital admission, you had an ischemic stroke. During an ischemic stroke, blood stops flowing to part of your brain because of a blockage in the blood vessel. This can damage areas in the brain that control other parts of the body.  Please take your aspirin and plavix for blood thinning for 21 days and then only aspirin. Continue to take Atorvastatin for cholesterol medication/blood vessel protection as prescribed to prevent further strokes. Do not skip doses and do not run low on your medication. If you run low on your medication, please contact your doctor.  You will follow up outpatient with the stroke clinic.  Doing your regular tasks may be difficult after you've had a stroke, but you can learn new ways to manage your daily activities. In fact, doing daily activities may help you to regain muscle strength. Be patient, give yourself time to adjust, and appreciate the progress you make. For example, when showering or bathing, test the water temperature with a hand or foot that was not affected by the stroke, use grab bars, a shower seat, a hand-held showerhead, etc. It is normal to feel fatigue after a stroke, while some days may be worse than others, you will continue to improve.  Call 911 right away if you have any of the following symptoms of another stroke:  B: Balance: Sudden: Dizziness, loss of balance, or a sense of falling, difficulty with coordinating movement  E: Eyes: Sudden double vision or trouble seeing in one or both eyes  F: Face: Sudden uneven face  A: Arms (Legs): Sudden weakness, tingling, or loss of feeling on one side of your face or body  S: Speech: Sudden trouble talking or slurred speech, sudden difficulty understanding others  T: Time: Please call 911 right away and go to the emergency room  •Sudden, severe headache  •Blackouts or seizures

## 2023-06-03 NOTE — PROGRESS NOTE ADULT - PROBLEM SELECTOR PLAN 9
F: Tolerating oral   E: Replete PRN K > 3.5, Mg > 2   GI: None  DVT: Heparin SubQ   Code: Full code  Dispo: [facility]
F: Tolerating oral   E: Replete PRN K > 3.5, Mg > 2   GI: None  DVT: Heparin SubQ   Code: Full code  Dispo: [facility]

## 2023-06-03 NOTE — DISCHARGE NOTE PROVIDER - NSDCFUADDAPPT_GEN_ALL_CORE_FT
Stroke office will call to schedule an appointment with a member of the stroke team.  Stroke office will call to schedule an appointment with a member of the stroke team.     Follow up with Dr. Carissa Ortega at [facility] for further work up of parotid lesion     Follow up with PCP for age appropriate cancer screening (mammogram, colonoscopy) and repeat thyroid ultrasound in 6 months    Follow up with outpatient nephrology for CKD (Jennifer Justice)    Follow up with Dr. Cary for you Diabetes management Stroke office will call to schedule an appointment with a member of the stroke team.     Follow up with Dr. Carissa Ortega at [facility] for further work up of parotid lesion     Follow up with PCP for age appropriate cancer screening (mammogram, colonoscopy)     Follow up with outpatient nephrology for CKD (Jennifer Justice)    Follow up with Dr. Cary for you Diabetes management and repeat thyroid ultrasound in 6 months Stroke office will call to schedule an appointment with a member of the stroke team.     Follow up with Dr. Carissa Ortega at [facility] for further work up of parotid lesion     Follow up with PCP for age appropriate cancer screening (mammogram, colonoscopy), you will also be seen in a few days to repeat a BMP to assess your kidney function after starting Lasix.    Follow up with outpatient nephrology for CKD (Jennifer Justice)    Follow up with Dr. Cary for you Diabetes management and repeat thyroid ultrasound in 6 months

## 2023-06-03 NOTE — PROGRESS NOTE ADULT - ASSESSMENT
64 y/o F with pmhx of [place] Palsy (left side), T2DM, CVA 2022 (left-sided deficits), CAD, HLD, HTN, Asthma, current smoker with a 52 pack year hx, ? AFib reportedly compliant on Eliquis who presented for evaluation of generalized weakness. Weakness started 5/21, got progressively worse 2-3 days ago. Daughter also noticed patient had been falling with occasional episodes of urinary incontinence. CTH with small vessel disease, CT C-spine negative for acute fx. Admitted to medicine for further w/u. Gen neuro consulted, recommended MRI brain w/o. Found to have R pontine infarct. Patient transferred to stroke tele for further management.     Neuro  #CVA workup  - continue home Eliquis 5 mg po bid   - start plavix 75 mg po qd  - continue atorvastatin 80mg daily  - q4hr stroke neuro checks and vitals  - MRI brain: small focus of restricted diffusion in the R petra c/w acute ischemia  - Stroke Code HCT Results: small vessel disease  - pending MRA H/N given pts hx CKD  - f/u B12, folate, serum electrophoresis and immunofixation   - Stroke education    #urinary incontinence, ? hyperreflexia r/o cord compression   - CT C-spine: no fx   - f/u MRI C/T/L Spine     Cards  #HTN  - permissive hypertension, Goal -180  - hold home blood pressure medication for now  - obtain TTE with bubble  - Stroke Code EKG Results: NSR with L axis deviation and occasional Q waves but no active ischemic changes    #HLD  - high dose statin as above in CVA  - LDL results: pending     Pulm  - call provider if SPO2 < 94%    GI  #Nutrition/Fluids/Electrolytes   - replete K<4 and Mg <2  - Diet: Consistent carb  - IVF: none    Renal  #CKD   - Cr 1.7 on admission   - Will continue to trend    Infectious Disease  - Stroke Code CXR results: negative    Endocrine  #DM  - A1C results: 9.8  - ISS  - continue Lantus 10U   - consult endocrine for further recs    - TSH results: pending    DVT Prophylaxis  - continue Eliquis    Dispo: pending     Discussed daily hospital plans and goals with patient    Discussed with Neurology Attending, Dr. Heck 64 y/o F with pmhx of [place] Palsy (left side), T2DM, CVA 2022 (left-sided deficits), CAD, HLD, HTN, Asthma, current smoker with a 52 pack year hx, ? AFib reportedly compliant on Eliquis who presented for evaluation of generalized weakness. Weakness started 5/21, got progressively worse 2-3 days ago. Daughter also noticed patient had been falling with occasional episodes of urinary incontinence. CTH with small vessel disease, CT C-spine negative for acute fx. Admitted to medicine for further w/u. Gen neuro consulted, recommended MRI brain w/o. Found to have R pontine infarct. Patient transferred to stroke tele for further management.     Neuro  #CVA workup  - continue home Eliquis 5 mg po bid   - start plavix 75 mg po qd  - continue atorvastatin 80mg daily  - q4hr stroke neuro checks and vitals  - MRI brain: small focus of restricted diffusion in the R petra c/w acute ischemia  - Stroke Code HCT Results: small vessel disease  - pending MRA H/N given pts hx CKD  - f/u B12, folate, serum electrophoresis and immunofixation   - Stroke education    #urinary incontinence, ? hyperreflexia r/o cord compression   - CT C-spine: no fx   - f/u MRI C/T/L Spine     Cards  #HTN  - permissive hypertension, Goal -180  - continue home Metop and Amlodipine  - obtain TTE with bubble  - Stroke Code EKG Results: NSR with L axis deviation and occasional Q waves but no active ischemic changes    #HLD  - high dose statin as above in CVA  - LDL results: pending     Pulm  - call provider if SPO2 < 94%    GI  #Nutrition/Fluids/Electrolytes   - replete K<4 and Mg <2  - Diet: Consistent carb  - IVF: none    Renal  #CKD   - Cr 1.7 on admission   - Will continue to trend    Infectious Disease  - Stroke Code CXR results: negative    Endocrine  #DM  - A1C results: 9.8  - ISS  - continue Lantus 10U   - consult endocrine for further recs    - TSH results: pending    DVT Prophylaxis  - continue Eliquis    Dispo: pending     Discussed daily hospital plans and goals with patient    Discussed with Neurology Attending, Dr. Heck

## 2023-06-03 NOTE — PROGRESS NOTE ADULT - ASSESSMENT
Ms. Hendricks is a 64 y/o F with a PMHx of [place] Palsy, TIIDM, CVA (left side weak), CAD, HLD, HTN, Asthma, current smoker with a 52 pack year hx, brought for evaluation of worsening acute on chronic  weakness since 5/21, found to have a right pontine stroke, transferred to stroke tele for further management.

## 2023-06-03 NOTE — PROGRESS NOTE ADULT - PROBLEM SELECTOR PLAN 2
On examination, pt with slow gait and positive Rombergs. Pt denies diarrheal illness predisposing for GBS. Insidious onset of b/l lower and upper extremity weakness suspicious for diabetic neuropathy i/s/o poor medication compliance. CTH on 6/2 negative for acute CVA, though small vessel disease suggestive of chronic ischemia noted. Additionally, CTH noted to show a left thalamic lacunar infarct. Slit-like hypodensity in the left striatal capsular region (1:11), which may represent sequela of prior hemorrhage/infarct. Cannot rule out ischemic cause of weakness.   Plan:  - f/u B12, folate, RPR to r/o dorsal column process Patient's daughter noted that patient has had a 1 week history of urinary incontinence in addition to generalized weakness and dysarthria as noted above.   - f/u MRI non con of cervical, thoracic, and lumbar spine to r/o cord compression

## 2023-06-03 NOTE — DISCHARGE NOTE PROVIDER - NPI NUMBER (FOR SYSADMIN USE ONLY) :
[[id number]] [[id number]],[[id number]] [[id number]],[[id number]],[UNKNOWN],[[id number]] [[id number]],[[id number]],[UNKNOWN],[[id number]] [[id number]],[[id number]],[[id number]],[[id number]] [[id number]],[[id number]],[UNKNOWN]

## 2023-06-03 NOTE — DISCHARGE NOTE PROVIDER - NSDCACTIVITY_GEN_ALL_CORE
----- Message from Raquel Clements sent at 7/23/2020  1:03 PM CDT -----  Contact: Leif ( Son)-[phone]  Type:  Same Day Appointment Request    Caller is requesting a same day appointment.  Caller declined first available appointment listed below.    Name of Caller: PT's Son Leif  When is the first available appointment? 9/22  Symptoms: pt is having breathing issues  Best Call Back Number:[phone]  Additional Information:  pt's son would like to schedule a Audio Visit today with the        No restrictions

## 2023-06-03 NOTE — PHYSICAL THERAPY INITIAL EVALUATION ADULT - PERTINENT HX OF CURRENT PROBLEM, REHAB EVAL
Ms. Hendricks is a 62 y/o F with a PMHx of [place] Palsy, TIIDM, CVA (left side weak), CAD, HLD, HTN, Asthma, current smoker with a 52 pack year hx, brought for evaluation of worsening acute on chronic  weakness since 5/21. MRI short stroke protocol revealed stroke in the right petra

## 2023-06-03 NOTE — SWALLOW BEDSIDE ASSESSMENT ADULT - PHARYNGEAL PHASE
Suspect variably delayed swallow and likely mistiming with mixed consistency items. Hyolaryngeal complex movement appreciated via palpation, though suspect reduced. Clinical indicators of penetration/aspiration noted with mixed consistency and delayed cough/increased WOB with consecutive sips of thin liquids. No jose clinical indicators of penetration/aspiration noted with single sips of thin liquids and purees. Multiple swallows (2) noted across PO.

## 2023-06-03 NOTE — PROGRESS NOTE ADULT - PROBLEM SELECTOR PLAN 4
On examination, pt with slow gait and positive Rombergs. Pt denies diarrheal illness predisposing for GBS. Insidious onset of b/l lower and upper extremity weakness suspicious for diabetic neuropathy i/s/o poor medication compliance. CTH on 6/2 negative for acute CVA, though small vessel disease suggestive of chronic ischemia noted. Additionally, CTH noted to show a left thalamic lacunar infarct. Slit-like hypodensity in the left striatal capsular region (1:11), which may represent sequela of prior hemorrhage/infarct. Cannot rule out ischemic cause of weakness.  Plan:  - f/u B12, folate, RPR to r/o dorsal column process Pt with hx of DM, taking Lispro 25U once a day at home. Pt's daughter Rosemarie states she believes her mother was told to take her insulin more frequently. Pt also taking Metformin 1g BID. Pt with Glucose of 360 in ED on admission, UA showed 500 glucose and proteinuria.   Plan:   - Pt with A1C of 9.8  - mISS inpatient with consistent carb diet  - Lantus 10U started inpatient, reassess pre-meal insulin requirements  -f/u endocrine recs

## 2023-06-03 NOTE — PROGRESS NOTE ADULT - PROBLEM SELECTOR PLAN 2
Patient's daughter noted that patient has had a 1 week history of urinary incontinence in addition to generalized weakness and dysarthria as noted above.   -no cord compression on MRI spine  -outpt f/u for mgmt if persists

## 2023-06-03 NOTE — CONSULT NOTE ADULT - ASSESSMENT
ASSESSMENT / RECOMMENDATIONS  62 y/o F with pmhx of [place] Palsy (left side), T2DM, CVA 2022 (left-sided deficits), CAD, HLD, HTN, Asthma, current smoker with a 52 pack year hx, ? AFib reportedly compliant on Eliquis who presented for evaluation of generalized weakness. Found to have R pontine infarct. Endocrinology consulted for diabetes management.    A1C: 9.8 %  BUN: 24  Creatinine: 1.46  eGFR: 40Weight (kg): 96.6  BMI (kg/m2): 41.6  Ejection Fraction:     Isak Timmons   Endocrinology Fellow    Service Pager: [phone]

## 2023-06-03 NOTE — PROGRESS NOTE ADULT - PROBLEM SELECTOR PLAN 1
MRI with short stroke protocol revealed acute ischemia in the right petra.  - transfer to stroke tele   - resumed patient's home eliquis 5mg BID MRI with short stroke protocol revealed acute ischemia in the right petra.  - transfer to stroke tele   - resumed patient's home eliquis 5mg BID  - started plavix 75mg qd   - f/u MRA head and neck

## 2023-06-03 NOTE — PHYSICAL THERAPY INITIAL EVALUATION ADULT - MODALITIES TREATMENT COMMENTS
right hand dominant; (L) hand  4/5, (R) hand  4/5. CN Testing: left Frontalis impaired;  left buccinator impaired; smile decreased on left ; tongue protrusion at midline; left eyes open/close decreased on left ; Shoulder elevation: 4/5 b/l, Vision H-Test: bilateral tracking and smooth pursuit intact; Convergence/Divergence: intact; Vision Quadrant Test: left nasal field impaired, impaired hearing on left

## 2023-06-03 NOTE — PROGRESS NOTE ADULT - SUBJECTIVE AND OBJECTIVE BOX
***TRANSFER FROM [facility] TO STROKE TELE***    Neurology Stroke Progress Note    INTERVAL HPI/OVERNIGHT EVENTS:  Patient seen and examined. Went for MRI however refused vessel imaging as her "legs were in pain." Found to have a R pontine infarct. Reportedly on Eliquis for a hx of AFib, denies ever missing a dose. States she has been feeling generalized weakness since , symptoms got progressively worse 2-3 days ago.     MEDICATIONS  (STANDING):  amLODIPine   Tablet 10 milliGRAM(s) Oral daily  apixaban 5 milliGRAM(s) Oral every 12 hours  atorvastatin 80 milliGRAM(s) Oral at bedtime  clopidogrel Tablet 75 milliGRAM(s) Oral daily  dextrose 5%. 1000 milliLiter(s) (100 mL/Hr) IV Continuous <Continuous>  dextrose 5%. 1000 milliLiter(s) (50 mL/Hr) IV Continuous <Continuous>  dextrose 50% Injectable 25 Gram(s) IV Push once  dextrose 50% Injectable 25 Gram(s) IV Push once  dextrose 50% Injectable 12.5 Gram(s) IV Push once  glucagon  Injectable 1 milliGRAM(s) IntraMuscular once  insulin glargine Injectable (LANTUS) 10 Unit(s) SubCutaneous at bedtime  insulin lispro (ADMELOG) corrective regimen sliding scale   SubCutaneous Before meals and at bedtime  metoprolol succinate ER 50 milliGRAM(s) Oral daily    MEDICATIONS  (PRN):  acetaminophen     Tablet .. 650 milliGRAM(s) Oral every 6 hours PRN Temp greater or equal to 38C (100.4F), Moderate Pain (4 - 6)  dextrose Oral Gel 15 Gram(s) Oral once PRN Blood Glucose LESS THAN 70 milliGRAM(s)/deciliter      Allergies    codeine (Unknown)    Intolerances        Vital Signs Last 24 Hrs  T(C): 36.6 (2023 00:32), Max: 36.8 (2023 19:00)  T(F): 97.8 (2023 00:32), Max: 98.2 (2023 19:00)  HR: 86 (2023 00:32) (66 - 86)  BP: 140/66 (2023 02:25) (133/66 - 181/61)  BP(mean): --  RR: 18 (2023 00:32) (16 - 18)  SpO2: 96% (2023 00:32) (96% - 100%)    Parameters below as of 2023 00:32  Patient On (Oxygen Delivery Method): room air        Physical exam:  General: Appears to be in mild distress however remains awake and alert  Eyes: Anicteric sclerae, moist conjunctivae, see below for CNs  Extremities: LE edema    Neurologic:  -Mental status: Awake, alert, oriented to person and place, able to tell month with choices. Unable to tell the year. Speech is fluent with intact naming, repetition, and comprehension, mildly dysarthric. Follows simple and complex commands. Attention/concentration intact. Fund of knowledge appropriate.  -Cranial nerves:   II: Visual fields are full to confrontation.  III, IV, VI: Extraocular movements are intact without nystagmus. Pupils equally round and reactive to light  V: Decreased sensation along L V1-V3 (residual from her prior hx of Bell's palsy). Sensory deficits split down the middle of her face.  VII: L NLFF. Decreased forehead wrinkling on the L. Decreased strength with L eye when provider attempted to open (residual from pts hx of Bell's)  XII: Tongue protrudes midline  Motor: Normal bulk and tone. B/l UE 4/5 without drift. B/l LEs 3-/5 with drift, do not hit the bed.  Sensation: Intact to light touch bilaterally. No neglect or extinction on double simultaneous testing.  Coordination: No obvious dysmetria with finger-to-nose  Reflexes: Downgoing toes bilaterally   Gait: Deferred    LABS:                        12.7   7.98  )-----------( 242      ( 2023 14:20 )             37.2     06-02    x   |  x   |  x   ----------------------------<  404<H>  x    |  x   |  x     Ca    8.8      2023 14:20  Mg     1.9     06-02    TPro  6.5  /  Alb  3.4  /  TBili  0.2  /  DBili  x   /  AST  11  /  ALT  12  /  AlkPhos  117  06-02      Urinalysis Basic - ( 2023 14:20 )    Color: Yellow / Appearance: Clear / S.020 / pH: x  Gluc: x / Ketone: NEGATIVE  / Bili: Negative / Urobili: 0.2 E.U./dL   Blood: x / Protein: 100 mg/dL / Nitrite: NEGATIVE   Leuk Esterase: NEGATIVE / RBC: 5-10 /HPF / WBC < 5 /HPF   Sq Epi: x / Non Sq Epi: x / Bacteria: Present /HPF        RADIOLOGY & ADDITIONAL TESTS:  < from: CT Head No Cont (23 @ 15:05) >    IMPRESSION:  1.  No acute intracranial hemorrhage, mass effect, or demarcated recent   infarction.  2.  Small vessel ischemic change throughout cerebral white matter and   deep gray/white matter lacunae.    < end of copied text >    < from: CT Cervical Spine No Cont (23 @ 15:05) >  IMPRESSION: No fracture.    < end of copied text >    < from: MR Head No Cont (23 @ 00:04) >    IMPRESSION:    Small focus of restricted diffusion in the right petra, consistent with   acute ischemia.    < end of copied text >     ***TRANSFER FROM [facility] TO STROKE TELE***    Assessment: 62 y/o F with pmhx of [place] Palsy (left side), T2DM, CVA  (left-sided deficits), CAD, HLD, HTN, Asthma, current smoker with a 52 pack year hx, ? AFib compliant on Eliquis who presented to the ED for evaluation of worsening generalized weakness. As per patient's daughter, patient has had dysarthria and confusion for several months. She has noticed that over the last week, patient has had falls and a few episodes of urinary incontinence. Since , patient has been experiencing generalized weakness that has progressively worsened over the last 2-3 days. When asked the patient her incontinence, she says she just feels like she's too weak to make it to the bathroom. On admission vitals were significant for a BP of 180/82, CXR was unremarkable, CTH showed no evidence of acute intracranial hemorrhage, mass effect, or recent infarction. CT cervical spine showed no evidence of fracture. EKG showed NSR with L axis deviation and occasional Q waves but no active ischemic changes. Patient was admitted to medicine for further workup with concerns for acute CVA, toxic metabolic encephalopathy, and cord compression. Gen neuro was consulted and recommended an MRI due to concern for possible sensory lacunar syndrome. Patient went to MRI C/T/L and MRA H/N, as patient with no vessel imaging 2/2 to elevated Cr. Spine imaging completed however patient refused MRA H/N as her "legs were hurting too much." MRI with short stroke protocol was able to be completed which revealed a small right pontine stroke. Decision made to transfer patient to stroke tele for further management. Prior to transferring the patient. noted to be hypertensive to the 200s, received 10 of IV Hydral. Restarted patient on home Eliquis and Plavix with plan to repeat MRA H/N imaging in the AM. ECHO ordered. Will consult endocrine to help further manage pts DM and confirm with patient's sister use of AC.    Neurology Stroke Progress Note    INTERVAL HPI/OVERNIGHT EVENTS:  Patient seen and examined. Went for MRI however refused vessel imaging as her "legs were in pain." Found to have a R pontine infarct. Reportedly on Eliquis for a hx of AFib, denies ever missing a dose. States she has been feeling generalized weakness since 5/21, symptoms got progressively worse 2-3 days ago.     MEDICATIONS  (STANDING):  amLODIPine   Tablet 10 milliGRAM(s) Oral daily  apixaban 5 milliGRAM(s) Oral every 12 hours  atorvastatin 80 milliGRAM(s) Oral at bedtime  clopidogrel Tablet 75 milliGRAM(s) Oral daily  dextrose 5%. 1000 milliLiter(s) (100 mL/Hr) IV Continuous <Continuous>  dextrose 5%. 1000 milliLiter(s) (50 mL/Hr) IV Continuous <Continuous>  dextrose 50% Injectable 25 Gram(s) IV Push once  dextrose 50% Injectable 25 Gram(s) IV Push once  dextrose 50% Injectable 12.5 Gram(s) IV Push once  glucagon  Injectable 1 milliGRAM(s) IntraMuscular once  insulin glargine Injectable (LANTUS) 10 Unit(s) SubCutaneous at bedtime  insulin lispro (ADMELOG) corrective regimen sliding scale   SubCutaneous Before meals and at bedtime  metoprolol succinate ER 50 milliGRAM(s) Oral daily    MEDICATIONS  (PRN):  acetaminophen     Tablet .. 650 milliGRAM(s) Oral every 6 hours PRN Temp greater or equal to 38C (100.4F), Moderate Pain (4 - 6)  dextrose Oral Gel 15 Gram(s) Oral once PRN Blood Glucose LESS THAN 70 milliGRAM(s)/deciliter      Allergies    codeine (Unknown)    Intolerances        Vital Signs Last 24 Hrs  T(C): 36.6 (2023 00:32), Max: 36.8 (2023 19:00)  T(F): 97.8 (2023 00:32), Max: 98.2 (2023 19:00)  HR: 86 (2023 00:32) (66 - 86)  BP: 140/66 (2023 02:25) (133/66 - 181/61)  BP(mean): --  RR: 18 (2023 00:32) (16 - 18)  SpO2: 96% (2023 00:32) (96% - 100%)    Parameters below as of 2023 00:32  Patient On (Oxygen Delivery Method): room air        Physical exam:  General: Appears to be in mild distress however remains awake and alert  Eyes: Anicteric sclerae, moist conjunctivae, see below for CNs  Extremities: LE edema    Neurologic:  -Mental status: Awake, alert, oriented to person and place, able to tell month with choices. Unable to tell the year. Speech is fluent with intact naming, repetition, and comprehension, mildly dysarthric. Follows simple and complex commands. Attention/concentration intact. Fund of knowledge appropriate.  -Cranial nerves:   II: Visual fields are full to confrontation.  III, IV, VI: Extraocular movements are intact without nystagmus. Pupils equally round and reactive to light  V: Decreased sensation along L V1-V3 (residual from her prior hx of Bell's palsy). Sensory deficits split down the middle of her face.  VII: R NLFF. Decreased forehead wrinkling on the L. Decreased strength with L eye when provider attempted to open (residual from pts hx of Bell's)  XII: Tongue protrudes midline  Motor: Normal bulk and tone. B/l UE 4/5 without drift. B/l LEs 3-/5 with drift, do not hit the bed.  Sensation: Intact to light touch bilaterally. No neglect or extinction on double simultaneous testing.  Coordination: No obvious dysmetria with finger-to-nose  Gait: Deferred    LABS:                        12.7   7.98  )-----------( 242      ( 2023 14:20 )             37.2     06-02    x   |  x   |  x   ----------------------------<  404<H>  x    |  x   |  x     Ca    8.8      2023 14:20  Mg     1.9     06-02    TPro  6.5  /  Alb  3.4  /  TBili  0.2  /  DBili  x   /  AST  11  /  ALT  12  /  AlkPhos  117  06-02      Urinalysis Basic - ( 2023 14:20 )    Color: Yellow / Appearance: Clear / S.020 / pH: x  Gluc: x / Ketone: NEGATIVE  / Bili: Negative / Urobili: 0.2 E.U./dL   Blood: x / Protein: 100 mg/dL / Nitrite: NEGATIVE   Leuk Esterase: NEGATIVE / RBC: 5-10 /HPF / WBC < 5 /HPF   Sq Epi: x / Non Sq Epi: x / Bacteria: Present /HPF        RADIOLOGY & ADDITIONAL TESTS:  < from: CT Head No Cont (23 @ 15:05) >    IMPRESSION:  1.  No acute intracranial hemorrhage, mass effect, or demarcated recent   infarction.  2.  Small vessel ischemic change throughout cerebral white matter and   deep gray/white matter lacunae.    < end of copied text >    < from: CT Cervical Spine No Cont (23 @ 15:05) >  IMPRESSION: No fracture.    < end of copied text >    < from: MR Head No Cont (23 @ 00:04) >    IMPRESSION:    Small focus of restricted diffusion in the right petra, consistent with   acute ischemia.    < end of copied text >

## 2023-06-03 NOTE — PROGRESS NOTE ADULT - PROBLEM SELECTOR PLAN 4
Pt with hx of DM, taking Lispro 25U once a day at home. Pt's daughter Rosemarie states she believes her mother was told to take her insulin more frequently. Pt also taking Metformin 1g BID. Pt with Glucose of 360 in ED on admission, UA showed 500 glucose and proteinuria.   Plan:   - Pt with A1C of 9.8  - mISS inpatient with consistent carb diet  - Lantus 10U started inpatient, reassess pre-meal insulin requirements  - Consider Endocrine consult in AM On examination, pt with slow gait and positive Rombergs. Pt denies diarrheal illness predisposing for GBS. Insidious onset of b/l lower and upper extremity weakness suspicious for diabetic neuropathy i/s/o poor medication compliance. CTH on 6/2 negative for acute CVA, though small vessel disease suggestive of chronic ischemia noted. Additionally, CTH noted to show a left thalamic lacunar infarct. Slit-like hypodensity in the left striatal capsular region (1:11), which may represent sequela of prior hemorrhage/infarct. Cannot rule out ischemic cause of weakness.  Plan:  - f/u B12, folate, RPR to r/o dorsal column process

## 2023-06-03 NOTE — DISCHARGE NOTE PROVIDER - NSDCFUSCHEDAPPT_GEN_ALL_CORE_FT
[facility]  INTMED [address]  Scheduled Appointment: 06/15/2023

## 2023-06-03 NOTE — SWALLOW BEDSIDE ASSESSMENT ADULT - DIET PRIOR TO ADMI
Soft solids and thin liquids (avoids raw/crunchy fruits/veg and meats, primarily consumes chicken and cooked veg)

## 2023-06-03 NOTE — SWALLOW BEDSIDE ASSESSMENT ADULT - COMMENTS
Pt reports baseline difficulty with mastication and swallowing hard food items since Bell's palsy (~3 years). Pt reports baseline difficulty with mastication and swallowing hard food items since dx of Bell's palsy (~3 years). She endorsed occasional cough with solids>liquids. She reports primarily consuming small sips and eats at a slow pace. She also endorsed dysgeusia for the last few months which has impacted her appetite and resulted in unintentional weight loss (though unable to provide amount). Denied recent PNA. She reports her speech is not significantly different from baseline (endorsed baseline speech impairment since dx of Bell's palsy), however, endorsed change in vocal quality.

## 2023-06-03 NOTE — PROGRESS NOTE ADULT - PROBLEM SELECTOR PLAN 6
Pt with hx of CVA last year 2022 with residual L sided weakness. Pt also with questionable hx of PVD, was seen by podiatrist on 5/21 who mentioned pt had diminished flow in her feet. No calf tenderness on exam. CTH non contrast on 6/2 showed no acute intracranial hemorrhage, mass effect, or demarcated recent infarction and small vessel ischemic change throughout cerebral white matter and deep gray/white matter lacunae.  Plan:   - Will c/w DVT ppx with Heparin SubQ as discussed below Pt with BUN/Cr of 31/1.70 on admission. UA showing proteinuria and glucosuria.   -creatinine downtrended to 1.4  -collateral on baseline creatinine needed - likely has diabetes related kidney disease

## 2023-06-03 NOTE — SWALLOW BEDSIDE ASSESSMENT ADULT - NS SPL SWALLOW CLINIC TRIAL FT
3oz water challenge: Failed (increased WOB and delayed cough)   The 3 oz water challenge is a swallow screen which if passed has a predictive rate of 96% sensitivity for identifying individuals safe to swallow (Reyes et al 2008).

## 2023-06-03 NOTE — PROGRESS NOTE ADULT - SUBJECTIVE AND OBJECTIVE BOX
----TRANSFER FROM MEDICINE TO STROKE TELE----  Hospital Course: ----TRANSFER FROM MEDICINE TO STROKE TELE----  Hospital Course:    Patient is a 64 y/o F with pmhx of [place] Palsy (left side), T2DM, CVA (left-sided deficits), CAD, HLD, HTN, Asthma, current smoker with a 52 pack year hx who presented for evaluation of worsening generalized weakness. As per patient's daughter, patient has had dysarthria and confusion for several months as well as b/l LE weakness and falls with urinary incontinence for 1 week duration. On admission vitals were significant for a BP of 180/82, CXR was unremarkable, CTH showed no evidence of acute intracranial hemorrhage, mass effect, or recent infarction. CT cervical spine showed no evidence of fracture. EKG showed NSR with L axis deviation and occasional Q waves but no active ischemic changes. Patient was....INCOMPLETE     Neuro PE:   MS: Awake, alert, oriented to person, place only. Dysarthric speech. Normal fund of knowledge.  CN: left ptosis, PERRL, EOMI, sensation intact in V1-3 distribution bilaterally, symmetric facies, smile and brow furrow. Palate midline with symmetric elevation, tongue midline, 5/5 strength of SCM and trapezius.  Motor: 4/5 strength of biceps, triceps, hand , RLE: 3/5 hip flexors, plantarflexion and dorsiflexion bilaterally LLE: 3/5 hip flexors, plantarflexion and dorsiflexion bilaterally  Reflexes: 2+ biceps, triceps, brachioradialis, 3+ patellar and achilles bilaterally. Plantar reflex upgoing   Sensation: intact to light touch in all extremities  Coordination: dysmetria of the left finger noted  Gait: deferred    #R/O Acute CVA: CTH negative, will obtain MRA brain and neck (CTA head CI d/t JUAN JOSÉ), hold ASA until hemorrhagic stroke r/o, stroke consult (recs) appreciated  #R/O Cord compression: MRI non con of the spine   #Toxic Metabolic Encephalopathy: no S/S for infection, f/u organic etiologies w/u.         EKG: NSR with L axis deviation and occasional Q waves but no active ischemic changes   Pt was admitted to [facility] for further workup of generalized weakness ----TRANSFER FROM MEDICINE TO STROKE TELE----  Hospital Course:    Patient is a 64 y/o F with pmhx of [place] Palsy (left side), T2DM, CVA (left-sided deficits), CAD, HLD, HTN, Asthma, current smoker with a 52 pack year hx who presented for evaluation of worsening generalized weakness. As per patient's daughter, patient has had dysarthria and confusion for several months as well as b/l LE weakness and falls with urinary incontinence for 1 week duration. On admission vitals were significant for a BP of 180/82, CXR was unremarkable, CTH showed no evidence of acute intracranial hemorrhage, mass effect, or recent infarction. CT cervical spine showed no evidence of fracture. EKG showed NSR with L axis deviation and occasional Q waves but no active ischemic changes. Patient was admitted to medicine for further workup with concerns for acute CVA, toxic metabolic encephalopathy, and cord compression. MRI with short stroke protocol revealed a small right pontine stroke for which patient is being transferred to stroke tele. Patient has been hypertensive with /87 with HR 65, hydralazine 10mg IV push was given. Patient could not tolerate MRI non contrast of the spine to rule out cord compression, will have to go back for another MRI of the spine.     Subjective/ROS: Patient seen and examined at bedside, awake and alert.     Denies Fever/Chills, HA, CP, SOB, n/v, changes in bowel/urinary habits.  12pt ROS otherwise negative.    VITALS  Vital Signs Last 24 Hrs  T(C): 36.6 (2023 00:32), Max: 36.8 (2023 19:00)  T(F): 97.8 (2023 00:32), Max: 98.2 (2023 19:00)  HR: 86 (2023 00:32) (66 - 86)  BP: 204/87 (2023 02:59) (133/66 - 204/87)  BP(mean): --  RR: 18 (2023 00:32) (16 - 18)  SpO2: 96% (2023 00:32) (96% - 100%)    Parameters below as of 2023 00:32  Patient On (Oxygen Delivery Method): room air    CAPILLARY BLOOD GLUCOSE  POCT Blood Glucose.: 336 mg/dL (2023 22:20)      PHYSICAL EXAM  General: NAD, awake and alert, but attempting to go to sleep, dysarthric speech  Head: NC/AT; MMM  Neck: Supple  Respiratory: CTAB; no accessory muscle use  Cardiovascular: Regular rhythm/rate; S1/S2+  Gastrointestinal: Soft; NTND; bowel sounds normal and present  Extremities: WWP; no edema/cyanosis  Neurological: see below    CN: left ptosis, PERRL, EOMI, sensation intact in V1-3 distribution bilaterally, symmetric facies, smile and brow furrow. Palate midline with symmetric elevation, tongue midline, 5/5 strength of SCM and trapezius.  Motor: 4/5 strength of biceps, triceps, hand , RLE: 3/5 hip flexors, plantarflexion and dorsiflexion bilaterally LLE: 3/5 hip flexors, plantarflexion and dorsiflexion bilaterally  Reflexes: 2+ biceps, triceps, brachioradialis, 3+ patellar and achilles bilaterally. Plantar reflex upgoing   Sensation: intact to light touch in all extremities  Coordination: dysmetria of the left finger noted  Gait: deferred    MEDICATIONS  (STANDING):  amLODIPine   Tablet 10 milliGRAM(s) Oral daily  apixaban 5 milliGRAM(s) Oral every 12 hours  atorvastatin 80 milliGRAM(s) Oral at bedtime  clopidogrel Tablet 75 milliGRAM(s) Oral daily  dextrose 5%. 1000 milliLiter(s) (100 mL/Hr) IV Continuous <Continuous>  dextrose 5%. 1000 milliLiter(s) (50 mL/Hr) IV Continuous <Continuous>  dextrose 50% Injectable 25 Gram(s) IV Push once  dextrose 50% Injectable 25 Gram(s) IV Push once  dextrose 50% Injectable 12.5 Gram(s) IV Push once  glucagon  Injectable 1 milliGRAM(s) IntraMuscular once  hydrALAZINE Injectable 10 milliGRAM(s) IV Push once  insulin glargine Injectable (LANTUS) 10 Unit(s) SubCutaneous at bedtime  insulin lispro (ADMELOG) corrective regimen sliding scale   SubCutaneous Before meals and at bedtime  metoprolol succinate ER 50 milliGRAM(s) Oral daily    MEDICATIONS  (PRN):  acetaminophen     Tablet .. 650 milliGRAM(s) Oral every 6 hours PRN Temp greater or equal to 38C (100.4F), Moderate Pain (4 - 6)  dextrose Oral Gel 15 Gram(s) Oral once PRN Blood Glucose LESS THAN 70 milliGRAM(s)/deciliter      codeine (Unknown)      LABS                        12.7   7.98  )-----------( 242      ( 2023 14:20 )             37.2     06-02    x   |  x   |  x   ----------------------------<  404<H>  x    |  x   |  x     Ca    8.8      2023 14:20  Mg     1.9     06-02    TPro  6.5  /  Alb  3.4  /  TBili  0.2  /  DBili  x   /  AST  11  /  ALT  12  /  AlkPhos  117  06-02      Urinalysis Basic - ( 2023 14:20 )    Color: Yellow / Appearance: Clear / S.020 / pH: x  Gluc: x / Ketone: NEGATIVE  / Bili: Negative / Urobili: 0.2 E.U./dL   Blood: x / Protein: 100 mg/dL / Nitrite: NEGATIVE   Leuk Esterase: NEGATIVE / RBC: 5-10 /HPF / WBC < 5 /HPF   Sq Epi: x / Non Sq Epi: x / Bacteria: Present /HPF      CARDIAC MARKERS ( 2023 14:20 )  x     / 0.01 ng/mL / x     / x     / x        IMAGING/EKG/ETC

## 2023-06-03 NOTE — DISCHARGE NOTE PROVIDER - NSDCADMDATE_GEN_ALL_CORE_FT
MD YARA Moncada Card Acs Amg Nurse Msg Pool  Looks Ok   Stable results.   Please notify patient     Echo 3/24/2023  Normal to hyperdynamic LV systolic function, EF 70%. At least moderately severe basal septal hypertrophy not reflected in the  measurements. Cavity size is at the lower limits of normal. No regional wall motion abnormalities. Grade 1 diastolic dysfunction mildly  elevated filling pressures. Strain attempted but did not track well.  Normal RV size and systolic function. Normal RVSP 32 mmHg and normal RA pressure.  Prominent mitral annular calcification. However, mean gradient is within normal limits at 2 mmHg. Trace MR.  Structurally normal, trace to mild MR.  Trileaflet sclerotic aortic valve mild central jet of aortic insufficiency.    Discussed results with patient who verbalized understanding. Pt state she is feeling \"OK\".  Confirmed upcoming appts with pt.  No further questions/concerns at this time.     02-Jun-2023 17:05

## 2023-06-03 NOTE — PROGRESS NOTE ADULT - PROBLEM SELECTOR PLAN 8
F: Tolerating oral   E: Replete PRN K > 3.5, Mg > 2   GI: None  DVT: Heparin SubQ   Code: Full code  Dispo: [facility] Pt with BUN/Cr of 31/1.70 on admission. Pt unsure of baseline Cr, states she was never told she had kidney problems before. UA showing proteinuria and glucosuria.   Plan:   - F/u with urine studies   - F/u bladder scan to r/o retention   - Continue to trend sCr, avoid nephrotoxic agents   - DVT ppx with Heparin SubQ

## 2023-06-03 NOTE — PROGRESS NOTE ADULT - PROBLEM SELECTOR PLAN 3
Pt with 52 year pack history, takes Lisinopril 40mg qd, Amlodipine 10mg qd, and Metoprolol 50mg qd  Plan:  - c/w Amlodipine 10mg with hold parameters   - Metoprolol 50mg QD with hold parameters  - Holding Lisinopril 40mg i/s/o JUAN JOSÉ as discussed below

## 2023-06-03 NOTE — SWALLOW BEDSIDE ASSESSMENT ADULT - SLP PERTINENT HISTORY OF CURRENT PROBLEM
*** PMHx of [place] Palsy (left), TIIDM, CVA (2022, CTH on 6/2 revealed left thalamic lacunar infarct. Slit-like hypodensity in the left striatal capsular region, which may represent sequela of prior hemorrhage/infarct), CAD, HLD, HTN, asthma, current smoker with a 52 pack year hx, who was brought in to [facility] for evaluation on 6/2/23 due to worsening acute on chronic  weakness since 5/21, found to have a right pontine stroke, transferred to stroke tele for further management.

## 2023-06-03 NOTE — PROGRESS NOTE ADULT - PROBLEM SELECTOR PLAN 1
MRI with short stroke protocol revealed acute ischemia in the right petra.  - transfer to stroke tele   - resumed patient's home eliquis 5mg BID  - started plavix 75mg qd   - f/u MRA head and neck

## 2023-06-03 NOTE — DISCHARGE NOTE PROVIDER - DETAILS OF MALNUTRITION DIAGNOSIS/DIAGNOSES
This patient has been assessed with a concern for Malnutrition and was treated during this hospitalization for the following Nutrition diagnosis/diagnoses:     -  06/09/2023: Morbid obesity (BMI > 40)

## 2023-06-03 NOTE — PROVIDER CONTACT NOTE (CHANGE IN STATUS NOTIFICATION) - ACTION/TREATMENT ORDERED:
given hydralazine 10mg PO, recheck blood pressure in 1 hour administer hydralazine 10mg IVP, recheck blood pressure in 1 hour

## 2023-06-03 NOTE — PROGRESS NOTE ADULT - PROBLEM SELECTOR PLAN 5
Pt with hx of CVA last year 2022 with residual L sided weakness. Pt also with questionable hx of PVD, was seen by podiatrist on 5/21 who mentioned pt had diminished flow in her feet. No calf tenderness on exam. CTH non contrast on 6/2 showed no acute intracranial hemorrhage, mass effect, or demarcated recent infarction and small vessel ischemic change throughout cerebral white matter and deep gray/white matter lacunae.  Plan:   - Will c/w DVT ppx with Heparin SubQ as discussed below  - Consider Aspirin therapy for hx of stroke Pt with hx of DM, taking Lispro 25U once a day at home. Pt's daughter Rosemarie states she believes her mother was told to take her insulin more frequently. Pt also taking Metformin 1g BID. Pt with Glucose of 360 in ED on admission, UA showed 500 glucose and proteinuria.   Plan:   - Pt with A1C of 9.8  - mISS inpatient with consistent carb diet  - Lantus 10U started inpatient, reassess pre-meal insulin requirements  - Consider Endocrine consult in AM

## 2023-06-04 LAB
ANION GAP SERPL CALC-SCNC: 10 MMOL/L — SIGNIFICANT CHANGE UP (ref 5–17)
BUN SERPL-MCNC: 24 MG/DL — HIGH (ref 7–23)
CALCIUM SERPL-MCNC: 8.7 MG/DL — SIGNIFICANT CHANGE UP (ref 8.4–10.5)
CHLORIDE SERPL-SCNC: 111 MMOL/L — HIGH (ref 96–108)
CO2 SERPL-SCNC: 21 MMOL/L — LOW (ref 22–31)
CREAT SERPL-MCNC: 1.56 MG/DL — HIGH (ref 0.5–1.3)
CULTURE RESULTS: SIGNIFICANT CHANGE UP
EGFR: 37 ML/MIN/1.73M2 — LOW
GLUCOSE BLDC GLUCOMTR-MCNC: 126 MG/DL — HIGH (ref 70–99)
GLUCOSE BLDC GLUCOMTR-MCNC: 175 MG/DL — HIGH (ref 70–99)
GLUCOSE BLDC GLUCOMTR-MCNC: 205 MG/DL — HIGH (ref 70–99)
GLUCOSE BLDC GLUCOMTR-MCNC: 234 MG/DL — HIGH (ref 70–99)
GLUCOSE SERPL-MCNC: 135 MG/DL — HIGH (ref 70–99)
HCT VFR BLD CALC: 36 % — SIGNIFICANT CHANGE UP (ref 34.5–45)
HGB BLD-MCNC: 12.1 G/DL — SIGNIFICANT CHANGE UP (ref 11.5–15.5)
MAGNESIUM SERPL-MCNC: 2 MG/DL — SIGNIFICANT CHANGE UP (ref 1.6–2.6)
MCHC RBC-ENTMCNC: 26.6 PG — LOW (ref 27–34)
MCHC RBC-ENTMCNC: 33.6 GM/DL — SIGNIFICANT CHANGE UP (ref 32–36)
MCV RBC AUTO: 79.1 FL — LOW (ref 80–100)
NRBC # BLD: 0 /100 WBCS — SIGNIFICANT CHANGE UP (ref 0–0)
PHOSPHATE SERPL-MCNC: 3.8 MG/DL — SIGNIFICANT CHANGE UP (ref 2.5–4.5)
PLATELET # BLD AUTO: 243 K/UL — SIGNIFICANT CHANGE UP (ref 150–400)
POTASSIUM SERPL-MCNC: 4.4 MMOL/L — SIGNIFICANT CHANGE UP (ref 3.5–5.3)
POTASSIUM SERPL-SCNC: 4.4 MMOL/L — SIGNIFICANT CHANGE UP (ref 3.5–5.3)
RBC # BLD: 4.55 M/UL — SIGNIFICANT CHANGE UP (ref 3.8–5.2)
RBC # FLD: 14.5 % — SIGNIFICANT CHANGE UP (ref 10.3–14.5)
SODIUM SERPL-SCNC: 142 MMOL/L — SIGNIFICANT CHANGE UP (ref 135–145)
SPECIMEN SOURCE: SIGNIFICANT CHANGE UP
T4 FREE SERPL-MCNC: 1.15 NG/DL — SIGNIFICANT CHANGE UP (ref 0.93–1.7)
TSH SERPL-MCNC: 1.67 UIU/ML — SIGNIFICANT CHANGE UP (ref 0.27–4.2)
WBC # BLD: 7.9 K/UL — SIGNIFICANT CHANGE UP (ref 3.8–10.5)
WBC # FLD AUTO: 7.9 K/UL — SIGNIFICANT CHANGE UP (ref 3.8–10.5)

## 2023-06-04 PROCEDURE — 99233 SBSQ HOSP IP/OBS HIGH 50: CPT

## 2023-06-04 PROCEDURE — 76536 US EXAM OF HEAD AND NECK: CPT | Mod: 26

## 2023-06-04 RX ORDER — LISINOPRIL 2.5 MG/1
10 TABLET ORAL ONCE
Refills: 0 | Status: COMPLETED | OUTPATIENT
Start: 2023-06-04 | End: 2023-06-04

## 2023-06-04 RX ORDER — LISINOPRIL 2.5 MG/1
40 TABLET ORAL DAILY
Refills: 0 | Status: DISCONTINUED | OUTPATIENT
Start: 2023-06-05 | End: 2023-06-13

## 2023-06-04 RX ADMIN — APIXABAN 5 MILLIGRAM(S): 2.5 TABLET, FILM COATED ORAL at 17:27

## 2023-06-04 RX ADMIN — Medication 4: at 21:55

## 2023-06-04 RX ADMIN — Medication 50 MILLIGRAM(S): at 05:57

## 2023-06-04 RX ADMIN — CLOPIDOGREL BISULFATE 75 MILLIGRAM(S): 75 TABLET, FILM COATED ORAL at 11:40

## 2023-06-04 RX ADMIN — Medication 4: at 16:40

## 2023-06-04 RX ADMIN — LISINOPRIL 10 MILLIGRAM(S): 2.5 TABLET ORAL at 09:38

## 2023-06-04 RX ADMIN — LISINOPRIL 10 MILLIGRAM(S): 2.5 TABLET ORAL at 17:36

## 2023-06-04 RX ADMIN — AMLODIPINE BESYLATE 10 MILLIGRAM(S): 2.5 TABLET ORAL at 05:57

## 2023-06-04 RX ADMIN — LISINOPRIL 20 MILLIGRAM(S): 2.5 TABLET ORAL at 05:57

## 2023-06-04 RX ADMIN — APIXABAN 5 MILLIGRAM(S): 2.5 TABLET, FILM COATED ORAL at 05:57

## 2023-06-04 RX ADMIN — Medication 2: at 11:40

## 2023-06-04 RX ADMIN — Medication 3 UNIT(S): at 06:59

## 2023-06-04 RX ADMIN — Medication 3 UNIT(S): at 16:40

## 2023-06-04 RX ADMIN — ATORVASTATIN CALCIUM 80 MILLIGRAM(S): 80 TABLET, FILM COATED ORAL at 21:54

## 2023-06-04 RX ADMIN — Medication 3 UNIT(S): at 11:39

## 2023-06-04 RX ADMIN — INSULIN GLARGINE 10 UNIT(S): 100 INJECTION, SOLUTION SUBCUTANEOUS at 21:55

## 2023-06-04 NOTE — OCCUPATIONAL THERAPY INITIAL EVALUATION ADULT - DIAGNOSIS, OT EVAL
Pt presents with L facial droop/L facial weakness, impaired LUE/LLE strength, impaired LUE/LLE coordination, decreased dynamic standing balance, and decreased functional endurance impacting her ability to independently complete ADLs, functional transfers, and functional mobility.

## 2023-06-04 NOTE — OCCUPATIONAL THERAPY INITIAL EVALUATION ADULT - ADDITIONAL COMMENTS
Pt reports she lives with her son and daughters in a house with 7STE. (Reports that when her son is not home her daughers are). Prior to admission pt reports she required assist from daughters for dressing and bathing (reports sometimes she was able to bathe independently however it was difficult). Pt has a bathtub shower with no DME. Pt is L hand dominant. Does not wear glasses at this time.

## 2023-06-04 NOTE — PROGRESS NOTE ADULT - PROBLEM SELECTOR PLAN 6
Pt with BUN/Cr of 31/1.70 on admission. UA showing proteinuria and glucosuria.   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 UA showing proteinuria and glucosuria.   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

## 2023-06-04 NOTE — OCCUPATIONAL THERAPY INITIAL EVALUATION ADULT - RANGE OF MOTION EXAMINATION, LOWER EXTREMITY
LLE knee extension with decreased AROM./Right LE Active ROM was WNL(within normal limits)/Right LE Passive ROM was WNL (within normal limits)

## 2023-06-04 NOTE — OCCUPATIONAL THERAPY INITIAL EVALUATION ADULT - PERSONAL SAFETY AND JUDGMENT, REHAB EVAL
able to find her room at end of session however pt nearly hitting the wall on L side with minimal awareness./impaired

## 2023-06-04 NOTE — OCCUPATIONAL THERAPY INITIAL EVALUATION ADULT - ORIENTATION, REHAB EVAL
pt initially stating that it was "2003" however able to self correct when provided with 3 choices./person/place

## 2023-06-04 NOTE — OCCUPATIONAL THERAPY INITIAL EVALUATION ADULT - PHYSICAL ASSIST/NONPHYSICAL ASSIST:DRESS LOWER BODY, OT EVAL
pt reports at baseline she requires assist from family for LB dressing/verbal cues/nonverbal cues (demo/gestures)/1 person assist

## 2023-06-04 NOTE — PROGRESS NOTE ADULT - ASSESSMENT
64 y/o F with pmhx of [place] Palsy (left side), T2DM, CVA 2022 (left-sided deficits), CAD, HLD, HTN, Asthma, current smoker with a 52 pack year hx, ? AFib reportedly compliant on Eliquis who presented for evaluation of generalized weakness. Weakness started 5/21, got progressively worse 2-3 days ago. Daughter also noticed patient had been falling with occasional episodes of urinary incontinence. CTH with small vessel disease, CT C-spine negative for acute fx. Admitted to medicine for further w/u. Gen neuro consulted, recommended MRI brain w/o. Found to have R pontine infarct. Patient transferred to stroke tele for further management.     Neuro  #CVA workup  - continue home Eliquis 5 mg po bid   - continue plavix 75 mg po qd  - continue atorvastatin 80mg daily  - q4hr stroke neuro checks and vitals  - MRI brain: small focus of restricted diffusion in the R petra c/w acute ischemia  - Stroke Code HCT Results: small vessel disease  - MRA head without steno-occlusive disease, f/u MRA neck report  - f/u B12, folate, serum electrophoresis and immunofixation   - Stroke education    #urinary incontinence, ? hyperreflexia r/o cord compression   - CT C-spine: no fx   - MRI C/T/L spine without cord compression    Cards  #HTN  - permissive hypertension, Goal -180  - continue home Metop and Amlodipine. increased lisinopril from 20 to 30mg of 6/4  - obtain TTE with bubble  - Stroke Code EKG Results: NSR with L axis deviation and occasional Q waves but no active ischemic changes    #HLD  - high dose statin as above in CVA  - LDL results: 162    Pulm  - call provider if SPO2 < 94%    GI  #Nutrition/Fluids/Electrolytes   - replete K<4 and Mg <2  - Diet: Consistent carb  - pending MBS  - IVF: none    Renal  #CKD   - Cr 1.7 on admission   - Will continue to trend    Infectious Disease  - Stroke Code CXR results: negative    Endocrine  #DM  - A1C results: 9.8  - ISS  - continue Lantus 10U, 3U premeal in addition to sliding scale  - appreciate endocrine recs    - TSH results: 2.000  - thyroid ultrasound ordered for nodules seen on CT C-spine    DVT Prophylaxis  - continue Eliquis    Dispo: pending     Discussed daily hospital plans and goals with patient    Discussed with Neurology Attending, Dr. Jaret Mcknight   64 y/o F with pmhx of [place] Palsy (left side), T2DM, CVA 2022 (left-sided deficits), CAD, HLD, HTN, Asthma, current smoker with a 52 pack year hx, ? AFib reportedly compliant on Eliquis who presented for evaluation of generalized weakness. Weakness started 5/21, got progressively worse 2-3 days ago. Daughter also noticed patient had been falling with occasional episodes of urinary incontinence. CTH with small vessel disease, CT C-spine negative for acute fx. Admitted to medicine for further w/u. Gen neuro consulted, recommended MRI brain w/o. Found to have R pontine infarct. Patient transferred to stroke tele for further management.     Neuro  #CVA workup  - continue home Eliquis 5 mg po bid   - continue plavix 75 mg po qd  - continue atorvastatin 80mg daily  - q4hr stroke neuro checks and vitals  - MRI brain: small focus of restricted diffusion in the R petra c/w acute ischemia  - Stroke Code HCT Results: small vessel disease  - MRA head without steno-occlusive disease, f/u MRA neck report  - f/u B12, folate, serum electrophoresis and immunofixation   - Stroke education    #urinary incontinence, ? hyperreflexia r/o cord compression   - CT C-spine: no fx   - MRI C/T/L spine without cord compression    Cards  #HTN  - permissive hypertension, Goal -180  - continue home Metop and Amlodipine. increased lisinopril from 20 to 30mg of 6/4  - obtain TTE with bubble  - Stroke Code EKG Results: NSR with L axis deviation and occasional Q waves but no active ischemic changes    #HLD  - high dose statin as above in CVA  - LDL results: 162    Pulm  - call provider if SPO2 < 94%    GI  #Nutrition/Fluids/Electrolytes   - replete K<4 and Mg <2  - Diet: Soft and bite sized  - pending MBS  - IVF: none    Renal  #CKD   - Cr 1.7 on admission   - Will continue to trend    Infectious Disease  - Stroke Code CXR results: negative    Endocrine  #DM  - A1C results: 9.8  - ISS  - continue Lantus 10U, 3U premeal in addition to sliding scale  - appreciate endocrine recs    - TSH results: 2.000  - thyroid ultrasound ordered for nodules seen on CT C-spine    DVT Prophylaxis  - continue Eliquis    Dispo: Acute Rehab     Discussed daily hospital plans and goals with patient    Discussed with Neurology Attending, Dr. Jaret Mcknight   62 y/o F with pmhx of [place] Palsy (left side), T2DM, CVA 2022 (left-sided deficits), CAD, HLD, HTN, Asthma, current smoker with a 52 pack year hx, ? AFib reportedly compliant on Eliquis who presented for evaluation of generalized weakness. Weakness started 5/21, got progressively worse 2-3 days ago. Daughter also noticed patient had been falling with occasional episodes of urinary incontinence. CTH with small vessel disease, CT C-spine negative for acute fx. Admitted to medicine for further w/u. Gen neuro consulted, recommended MRI brain w/o. Found to have R pontine infarct. Patient transferred to stroke tele for further management.     Neuro  #CVA workup  - continue home Eliquis 5 mg po bid   - continue plavix 75 mg po qd  - continue atorvastatin 80mg daily  - q4hr stroke neuro checks and vitals  - MRI brain: small focus of restricted diffusion in the R petra c/w acute ischemia  - Stroke Code HCT Results: small vessel disease  - MRA head without steno-occlusive disease, f/u MRA neck report  - f/u B12, folate, serum electrophoresis and immunofixation   - Stroke education    #urinary incontinence, ? hyperreflexia r/o cord compression   - CT C-spine: no fx   - MRI C/T/L spine without cord compression    Cards  #HTN  - permissive hypertension, Goal -180  - continue home Metop and Amlodipine. increased lisinopril from 20 to 40mg of 6/4  - obtain TTE with bubble  - Stroke Code EKG Results: NSR with L axis deviation and occasional Q waves but no active ischemic changes    #HLD  - high dose statin as above in CVA  - LDL results: 162    Pulm  - call provider if SPO2 < 94%    GI  #Nutrition/Fluids/Electrolytes   - replete K<4 and Mg <2  - Diet: Soft and bite sized  - pending MBS  - IVF: none    Renal  #CKD   - Cr 1.7 on admission   - Will continue to trend    Infectious Disease  - Stroke Code CXR results: negative    Endocrine  #DM  - A1C results: 9.8  - ISS  - continue Lantus 10U, 3U premeal in addition to sliding scale  - appreciate endocrine recs    - TSH results: 2.000  - thyroid ultrasound ordered for nodules seen on CT C-spine    DVT Prophylaxis  - continue Eliquis    Dispo: Acute Rehab     Discussed daily hospital plans and goals with patient    Discussed with Neurology Attending, Dr. Jaret Mcknight

## 2023-06-04 NOTE — PROGRESS NOTE ADULT - PROBLEM SELECTOR PLAN 4
Pt with hx of DM, taking Lispro 25U once a day at home. Pt's daughter Rosemarie states she believes her mother was told to take her insulin more frequently. Pt also taking Metformin 1g BID. Pt with Glucose of 360 in ED on admission, UA showed 500 glucose and proteinuria.   Plan:   - Pt with A1C of 9.8  - mISS inpatient with consistent carb diet  - Lantus 10U started inpatient, reassess pre-meal insulin requirements  -f/u endocrine recs

## 2023-06-04 NOTE — PROGRESS NOTE ADULT - PROBLEM SELECTOR PLAN 3
takes Lisinopril 40mg qd, Amlodipine 10mg qd, and Metoprolol 50mg qd  Plan:  - c/w Amlodipine 10mg with hold parameters   - Metoprolol 50mg QD with hold parameters  - Holding Lisinopril 40mg i/s/o JUAN JOSÉ as discussed below takes Lisinopril 40mg qd, Amlodipine 10mg qd, and Metoprolol 50mg qd  Plan:  - c/w Amlodipine 10mg with hold parameters   - Metoprolol 50mg QD with hold parameters  - lisinopril resumed for BP control -- monitor creatinine

## 2023-06-04 NOTE — PROGRESS NOTE ADULT - PROBLEM SELECTOR PLAN 7
F: Tolerating oral   GI: None  DVT: on eliquis  Code: Full code  Dispo: pending PT eval F: Tolerating oral   GI: None  DVT: on eliquis  Code: Full code  Dispo: AR

## 2023-06-04 NOTE — OCCUPATIONAL THERAPY INITIAL EVALUATION ADULT - SENSORY TESTS
CN Testing: R V1-V3 sensation in tact, reports tingling to L V1 and V3 dermatomes; Pt reports L hearing impaired (grossly 50% compared to R); R frontalis intact, L frontalis impaired with decreased AROM; R buccinator in tact, L absent; smile with L facial droop; tongue protrusion at midline w/ b/l lateralization in tact; b/l eyes open/close intact; b/l shoulder elevation intact R>L

## 2023-06-04 NOTE — OCCUPATIONAL THERAPY INITIAL EVALUATION ADULT - GENERAL OBSERVATIONS, REHAB EVAL
OT IE Completed. MRS 4. Pt's RN Luca cleared pt for OT. Pt received seated in bedside chair, +tele, +heplock, room air, NAD, agreeable to OT. Pt tolerated session well, left seated in chair, all lines in tact, needs in reach, covering ALLYSON Bailey aware.

## 2023-06-04 NOTE — OCCUPATIONAL THERAPY INITIAL EVALUATION ADULT - RANGE OF MOTION EXAMINATION, UPPER EXTREMITY
LUE shoulder flexion 0-90 degrees, LUE elbow flexion/extension AROM and L wrist/digits AROM WFL./Right UE Active ROM was WFL (within functional limits)/Right UE Passive ROM was WFL  (within functional limits)

## 2023-06-04 NOTE — PROGRESS NOTE ADULT - PROBLEM SELECTOR PLAN 1
MRI with short stroke protocol revealed acute ischemia in the right petra.  - transfer to stroke tele   - resumed patient's home eliquis 5mg BID  - started plavix 75mg qd   - f/u MRA head and neck  -further swallowing eval tomorrow with MBS MRI with short stroke protocol revealed acute ischemia in the right petra.  - resumed patient's home eliquis 5mg BID  - started plavix 75mg qd   MRAs - no occlusion, stenoses  -further swallowing eval tomorrow with MBS

## 2023-06-04 NOTE — PROGRESS NOTE ADULT - SUBJECTIVE AND OBJECTIVE BOX
Neurology Stroke Progress Note    INTERVAL HPI/OVERNIGHT EVENTS:  Patient seen and examined. Blood pressure elevated this morning to 183 systolic, increased lisinopril to 30mg QD.     MEDICATIONS  (STANDING):  amLODIPine   Tablet 10 milliGRAM(s) Oral daily  apixaban 5 milliGRAM(s) Oral every 12 hours  atorvastatin 80 milliGRAM(s) Oral at bedtime  clopidogrel Tablet 75 milliGRAM(s) Oral daily  dextrose 5%. 1000 milliLiter(s) (50 mL/Hr) IV Continuous <Continuous>  dextrose 5%. 1000 milliLiter(s) (100 mL/Hr) IV Continuous <Continuous>  dextrose 50% Injectable 25 Gram(s) IV Push once  dextrose 50% Injectable 25 Gram(s) IV Push once  dextrose 50% Injectable 12.5 Gram(s) IV Push once  glucagon  Injectable 1 milliGRAM(s) IntraMuscular once  insulin glargine Injectable (LANTUS) 10 Unit(s) SubCutaneous at bedtime  insulin lispro (ADMELOG) corrective regimen sliding scale   SubCutaneous Before meals and at bedtime  insulin lispro Injectable (ADMELOG) 3 Unit(s) SubCutaneous three times a day before meals  lisinopril 10 milliGRAM(s) Oral once  metoprolol succinate ER 50 milliGRAM(s) Oral daily    MEDICATIONS  (PRN):  acetaminophen     Tablet .. 650 milliGRAM(s) Oral every 6 hours PRN Temp greater or equal to 38C (100.4F), Moderate Pain (4 - 6)  dextrose Oral Gel 15 Gram(s) Oral once PRN Blood Glucose LESS THAN 70 milliGRAM(s)/deciliter      Allergies    codeine (Unknown)    Intolerances        Vital Signs Last 24 Hrs  T(C): 37 (2023 05:27), Max: 37.1 (2023 14:10)  T(F): 98.6 (2023 05:27), Max: 98.7 (2023 14:10)  HR: 71 (2023 08:48) (67 - 80)  BP: 183/78 (2023 08:48) (163/74 - 186/81)  BP(mean): 112 (2023 08:48) (103 - 120)  RR: 16 (2023 08:48) (16 - 25)  SpO2: 98% (2023 08:48) (95% - 98%)    Parameters below as of 2023 08:48  Patient On (Oxygen Delivery Method): room air      Physical exam:  General: Appears to be in mild distress however remains awake and alert  Eyes: Anicteric sclerae, moist conjunctivae, see below for CNs  Extremities: LE edema    Neurologic:  -Mental status: Awake, alert, oriented to person and place, able to tell month with choices. Unable to tell the year. Speech is fluent with intact naming, repetition, and comprehension, mildly dysarthric. Follows simple and complex commands. Attention/concentration intact. Fund of knowledge appropriate.  -Cranial nerves:   II: Visual fields are full to confrontation.  III, IV, VI: Extraocular movements are intact without nystagmus. Pupils equally round and reactive to light  V: Decreased sensation along L V1-V3 (residual from her prior hx of Bell's palsy). Sensory deficits split down the middle of her face.  VII: R NLFF. Decreased forehead wrinkling on the L. Decreased strength with L eye when provider attempted to open (residual from pts hx of Bell's)  XII: Tongue protrudes midline  Motor: Normal bulk and tone. B/l UE 4/5 without drift. B/l LEs 3-/5 with drift, do not hit the bed.  Sensation: Intact to light touch bilaterally. No neglect or extinction on double simultaneous testing.  Coordination: No obvious dysmetria with finger-to-nose  Gait: Deferred    LABS:                        12.1   7.90  )-----------( 243      ( 2023 06:48 )             36.0     06-04    142  |  111<H>  |  24<H>  ----------------------------<  135<H>  4.4   |  21<L>  |  1.56<H>    Ca    8.7      2023 06:48  Phos  3.8     06-04  Mg     2.0     06-04    TPro  6.1  /  Alb  3.2<L>  /  TBili  0.3  /  DBili  x   /  AST  11  /  ALT  11  /  AlkPhos  98  06-03      Urinalysis Basic - ( 2023 14:20 )    Color: Yellow / Appearance: Clear / S.020 / pH: x  Gluc: x / Ketone: NEGATIVE  / Bili: Negative / Urobili: 0.2 E.U./dL   Blood: x / Protein: 100 mg/dL / Nitrite: NEGATIVE   Leuk Esterase: NEGATIVE / RBC: 5-10 /HPF / WBC < 5 /HPF   Sq Epi: x / Non Sq Epi: x / Bacteria: Present /HPF        RADIOLOGY & ADDITIONAL TESTS:  < from: MR Angio Head No Cont (23 @ 17:57) >    IMPRESSION:  No stenosis or occlusion.    < end of copied text >  < from: MR Head No Cont (23 @ 00:04) >  IMPRESSION:    Small focus of restricted diffusion in the right petra, consistent with   acute ischemia.    < end of copied text >

## 2023-06-04 NOTE — OCCUPATIONAL THERAPY INITIAL EVALUATION ADULT - VISUAL ACUITY
Quadrant Testing WFL at this time. Pt reports difficulty reading small print- able to read therapist's name badge (name however pt unable to read smaller print with last name and job title). Pt with decreased accuracy reading medium sized print on paper for Television guide on table.

## 2023-06-04 NOTE — PROGRESS NOTE ADULT - PROBLEM SELECTOR PLAN 5
Pt w/ hx of CAD with stent placement as well as loop recorder. Pt taking Eliquis 5mg q12h, Atorvastatin 80mg, but no DAPT therapy.  Plan:   - C/w Atorvastatin 80mg QD Pt w/ hx of CAD with stent placement as well as loop recorder. Pt taking Eliquis 5mg q12h, Atorvastatin 80mg, but no DAPT therapy.  Plan:   - C/w Atorvastatin 80mg QD  -now on plavix after cva

## 2023-06-04 NOTE — OCCUPATIONAL THERAPY INITIAL EVALUATION ADULT - MANUAL MUSCLE TESTING RESULTS, REHAB EVAL
RUE shoulder flexion 5/5, RUE elbow flexion/extension 5/5, R  strength 5/5. LUE shoulder flexion 3-/5, LUE elbow flexion/extension 3+/5, L  strength grossly 4-/5. RLE grossly 5/5 throughout. LLE hip flexion 4-/5, LLE knee flexion 4-/5, LLE knee extension 4-/5, LLE ankle DF/PF 4/5.

## 2023-06-04 NOTE — OCCUPATIONAL THERAPY INITIAL EVALUATION ADULT - PERTINENT HX OF CURRENT PROBLEM, REHAB EVAL
64 y/o F with pmhx of [place] Palsy (left side), T2DM, CVA 2022 (left-sided deficits), CAD, HLD, HTN, Asthma, current smoker with a 52 pack year hx, ? AFib reportedly compliant on Eliquis who presented for evaluation of generalized weakness. Weakness started 5/21, got progressively worse 2-3 days ago. Daughter also noticed patient had been falling with occasional episodes of urinary incontinence. CTH with small vessel disease, CT C-spine negative for acute fx. Admitted to medicine for further w/u. Gen neuro consulted, recommended MRI brain w/o. Found to have R pontine infarct. Patient transferred to stroke tele for further management.

## 2023-06-04 NOTE — OCCUPATIONAL THERAPY INITIAL EVALUATION ADULT - MODIFIED CLINICAL TEST OF SENSORY INTEGRATION IN BALANCE TEST
Pt performed functional mobility in bedroom and hallway (approx 40ft x 2), with RW and CGA-min A. Pt with 2 slight L lateral LOB when turning to R requiring min A to recover. Pt noted to be veering to L during task. Slow radha noted with decreased step length.

## 2023-06-04 NOTE — PROGRESS NOTE ADULT - SUBJECTIVE AND OBJECTIVE BOX
OVERNIGHT EVENTS:    SUBJECTIVE / INTERVAL HPI: Patient seen and examined at bedside.     VITAL SIGNS:  Vital Signs Last 24 Hrs  T(C): 36.4 (2023 09:37), Max: 37.1 (2023 14:10)  T(F): 97.6 (2023 09:37), Max: 98.7 (2023 14:10)  HR: 71 (2023 08:48) (67 - 80)  BP: 183/78 (2023 08:48) (163/74 - 186/81)  BP(mean): 112 (2023 08:48) (103 - 120)  RR: 16 (2023 08:48) (16 - 25)  SpO2: 98% (2023 08:48) (95% - 98%)    Parameters below as of 2023 08:48  Patient On (Oxygen Delivery Method): room air        23 @ 07:01  -  23 @ 07:00  --------------------------------------------------------  IN: 720 mL / OUT: 600 mL / NET: 120 mL    23 @ 07:01  -  23 @ 09:43  --------------------------------------------------------  IN: 180 mL / OUT: 0 mL / NET: 180 mL        PHYSICAL EXAM:    General: WDWN  HEENT: NC/AT; PERRL, anicteric sclera; MMM  Neck: supple  Cardiovascular: +S1/S2; RRR  Respiratory: CTA B/L; no W/R/R  Gastrointestinal: soft, NT/ND; +BSx4  Extremities: WWP; no edema, clubbing or cyanosis  Vascular: 2+ radial, DP/PT pulses B/L  Neurological: AAOx3; no focal deficits    MEDICATIONS:  MEDICATIONS  (STANDING):  amLODIPine   Tablet 10 milliGRAM(s) Oral daily  apixaban 5 milliGRAM(s) Oral every 12 hours  atorvastatin 80 milliGRAM(s) Oral at bedtime  clopidogrel Tablet 75 milliGRAM(s) Oral daily  dextrose 5%. 1000 milliLiter(s) (100 mL/Hr) IV Continuous <Continuous>  dextrose 5%. 1000 milliLiter(s) (50 mL/Hr) IV Continuous <Continuous>  dextrose 50% Injectable 25 Gram(s) IV Push once  dextrose 50% Injectable 25 Gram(s) IV Push once  dextrose 50% Injectable 12.5 Gram(s) IV Push once  glucagon  Injectable 1 milliGRAM(s) IntraMuscular once  insulin glargine Injectable (LANTUS) 10 Unit(s) SubCutaneous at bedtime  insulin lispro (ADMELOG) corrective regimen sliding scale   SubCutaneous Before meals and at bedtime  insulin lispro Injectable (ADMELOG) 3 Unit(s) SubCutaneous three times a day before meals  metoprolol succinate ER 50 milliGRAM(s) Oral daily    MEDICATIONS  (PRN):  acetaminophen     Tablet .. 650 milliGRAM(s) Oral every 6 hours PRN Temp greater or equal to 38C (100.4F), Moderate Pain (4 - 6)  dextrose Oral Gel 15 Gram(s) Oral once PRN Blood Glucose LESS THAN 70 milliGRAM(s)/deciliter      ALLERGIES:  Allergies    codeine (Unknown)    Intolerances        LABS:                        12.1   7.90  )-----------( 243      ( 2023 06:48 )             36.0     06-04    142  |  111<H>  |  24<H>  ----------------------------<  135<H>  4.4   |  21<L>  |  1.56<H>    Ca    8.7      2023 06:48  Phos  3.8     06-04  Mg     2.0     06-04    TPro  6.1  /  Alb  3.2<L>  /  TBili  0.3  /  DBili  x   /  AST  11  /  ALT  11  /  AlkPhos  98  06-03      Urinalysis Basic - ( 2023 14:20 )    Color: Yellow / Appearance: Clear / S.020 / pH: x  Gluc: x / Ketone: NEGATIVE  / Bili: Negative / Urobili: 0.2 E.U./dL   Blood: x / Protein: 100 mg/dL / Nitrite: NEGATIVE   Leuk Esterase: NEGATIVE / RBC: 5-10 /HPF / WBC < 5 /HPF   Sq Epi: x / Non Sq Epi: x / Bacteria: Present /HPF      CAPILLARY BLOOD GLUCOSE      POCT Blood Glucose.: 126 mg/dL (2023 06:41)        RADIOLOGY & ADDITIONAL TESTS: Reviewed.    ASSESSMENT:    PLAN:  OVERNIGHT EVENTS: MRA performed - negative for stenoses/occlusions of head/neck    SUBJECTIVE / INTERVAL HPI: Patient seen and examined at bedside. Reports she feels tired today but that the tingling in her arms and legs is improved compared to yesterday    VITAL SIGNS:  Vital Signs Last 24 Hrs  T(C): 36.4 (2023 09:37), Max: 37.1 (2023 14:10)  T(F): 97.6 (2023 09:37), Max: 98.7 (2023 14:10)  HR: 71 (2023 08:48) (67 - 80)  BP: 183/78 (2023 08:48) (163/74 - 186/81)  BP(mean): 112 (2023 08:48) (103 - 120)  RR: 16 (2023 08:48) (16 - 25)  SpO2: 98% (2023 08:48) (95% - 98%)    Parameters below as of 2023 08:48  Patient On (Oxygen Delivery Method): room air        23 @ 07:01  -  23 @ 07:00  --------------------------------------------------------  IN: 720 mL / OUT: 600 mL / NET: 120 mL    23 @ 07:01  -  23 @ 09:43  --------------------------------------------------------  IN: 180 mL / OUT: 0 mL / NET: 180 mL        PHYSICAL EXAM:    General: WDWN  HEENT: NC/AT; PERRL, anicteric sclera; MMM  Neck: supple  Cardiovascular: +S1/S2; RRR  Respiratory: CTA B/L; no W/R/R  Gastrointestinal: soft, NT/ND; +BSx4  Extremities: WWP; no edema, clubbing or cyanosis  Vascular: 2+ radial, DP/PT pulses B/L  Neurological: AAOx3; speech clear, strength 4/5 upper and lower extremities b/l, left ptosis      MEDICATIONS:  MEDICATIONS  (STANDING):  amLODIPine   Tablet 10 milliGRAM(s) Oral daily  apixaban 5 milliGRAM(s) Oral every 12 hours  atorvastatin 80 milliGRAM(s) Oral at bedtime  clopidogrel Tablet 75 milliGRAM(s) Oral daily  dextrose 5%. 1000 milliLiter(s) (100 mL/Hr) IV Continuous <Continuous>  dextrose 5%. 1000 milliLiter(s) (50 mL/Hr) IV Continuous <Continuous>  dextrose 50% Injectable 25 Gram(s) IV Push once  dextrose 50% Injectable 25 Gram(s) IV Push once  dextrose 50% Injectable 12.5 Gram(s) IV Push once  glucagon  Injectable 1 milliGRAM(s) IntraMuscular once  insulin glargine Injectable (LANTUS) 10 Unit(s) SubCutaneous at bedtime  insulin lispro (ADMELOG) corrective regimen sliding scale   SubCutaneous Before meals and at bedtime  insulin lispro Injectable (ADMELOG) 3 Unit(s) SubCutaneous three times a day before meals  metoprolol succinate ER 50 milliGRAM(s) Oral daily    MEDICATIONS  (PRN):  acetaminophen     Tablet .. 650 milliGRAM(s) Oral every 6 hours PRN Temp greater or equal to 38C (100.4F), Moderate Pain (4 - 6)  dextrose Oral Gel 15 Gram(s) Oral once PRN Blood Glucose LESS THAN 70 milliGRAM(s)/deciliter      ALLERGIES:  Allergies    codeine (Unknown)    Intolerances        LABS:                        12.1   7.90  )-----------( 243      ( 2023 06:48 )             36.0     06-04    142  |  111<H>  |  24<H>  ----------------------------<  135<H>  4.4   |  21<L>  |  1.56<H>    Ca    8.7      2023 06:48  Phos  3.8     06-04  Mg     2.0     06-04    TPro  6.1  /  Alb  3.2<L>  /  TBili  0.3  /  DBili  x   /  AST  11  /  ALT  11  /  AlkPhos  98  06-03      Urinalysis Basic - ( 2023 14:20 )    Color: Yellow / Appearance: Clear / S.020 / pH: x  Gluc: x / Ketone: NEGATIVE  / Bili: Negative / Urobili: 0.2 E.U./dL   Blood: x / Protein: 100 mg/dL / Nitrite: NEGATIVE   Leuk Esterase: NEGATIVE / RBC: 5-10 /HPF / WBC < 5 /HPF   Sq Epi: x / Non Sq Epi: x / Bacteria: Present /HPF      CAPILLARY BLOOD GLUCOSE      POCT Blood Glucose.: 126 mg/dL (2023 06:41)        RADIOLOGY & ADDITIONAL TESTS: Reviewed.    ASSESSMENT:    PLAN:

## 2023-06-04 NOTE — PROGRESS NOTE ADULT - ASSESSMENT
Ms. Hendricks is a 62 y/o F with a PMHx of [place] Palsy, TIIDM, CVA (left side weak), CAD, HLD, HTN, Asthma, current smoker with a 52 pack year hx, brought for evaluation of worsening acute on chronic  weakness since 5/21, found to have a right pontine stroke, transferred to stroke tele for further management. Medicine following for comt

## 2023-06-05 LAB
ANION GAP SERPL CALC-SCNC: 9 MMOL/L — SIGNIFICANT CHANGE UP (ref 5–17)
BUN SERPL-MCNC: 27 MG/DL — HIGH (ref 7–23)
CALCIUM SERPL-MCNC: 8.5 MG/DL — SIGNIFICANT CHANGE UP (ref 8.4–10.5)
CHLORIDE SERPL-SCNC: 108 MMOL/L — SIGNIFICANT CHANGE UP (ref 96–108)
CO2 SERPL-SCNC: 22 MMOL/L — SIGNIFICANT CHANGE UP (ref 22–31)
CREAT SERPL-MCNC: 1.77 MG/DL — HIGH (ref 0.5–1.3)
EGFR: 32 ML/MIN/1.73M2 — LOW
GLUCOSE BLDC GLUCOMTR-MCNC: 106 MG/DL — HIGH (ref 70–99)
GLUCOSE BLDC GLUCOMTR-MCNC: 144 MG/DL — HIGH (ref 70–99)
GLUCOSE BLDC GLUCOMTR-MCNC: 153 MG/DL — HIGH (ref 70–99)
GLUCOSE BLDC GLUCOMTR-MCNC: 162 MG/DL — HIGH (ref 70–99)
GLUCOSE BLDC GLUCOMTR-MCNC: 184 MG/DL — HIGH (ref 70–99)
GLUCOSE SERPL-MCNC: 101 MG/DL — HIGH (ref 70–99)
HCT VFR BLD CALC: 35.3 % — SIGNIFICANT CHANGE UP (ref 34.5–45)
HGB BLD-MCNC: 11.9 G/DL — SIGNIFICANT CHANGE UP (ref 11.5–15.5)
IGA FLD-MCNC: 269 MG/DL — SIGNIFICANT CHANGE UP (ref 84–499)
IGG FLD-MCNC: 1040 MG/DL — SIGNIFICANT CHANGE UP (ref 610–1660)
IGM SERPL-MCNC: 186 MG/DL — SIGNIFICANT CHANGE UP (ref 35–242)
KAPPA LC SER QL IFE: 8.35 MG/DL — HIGH (ref 0.33–1.94)
KAPPA/LAMBDA FREE LIGHT CHAIN RATIO, SERUM: 1.66 RATIO — HIGH (ref 0.26–1.65)
LAMBDA LC SER QL IFE: 5.02 MG/DL — HIGH (ref 0.57–2.63)
MAGNESIUM SERPL-MCNC: 2 MG/DL — SIGNIFICANT CHANGE UP (ref 1.6–2.6)
MCHC RBC-ENTMCNC: 26.8 PG — LOW (ref 27–34)
MCHC RBC-ENTMCNC: 33.7 GM/DL — SIGNIFICANT CHANGE UP (ref 32–36)
MCV RBC AUTO: 79.5 FL — LOW (ref 80–100)
NRBC # BLD: 0 /100 WBCS — SIGNIFICANT CHANGE UP (ref 0–0)
PHOSPHATE SERPL-MCNC: 4.2 MG/DL — SIGNIFICANT CHANGE UP (ref 2.5–4.5)
PLATELET # BLD AUTO: 231 K/UL — SIGNIFICANT CHANGE UP (ref 150–400)
POTASSIUM SERPL-MCNC: 3.9 MMOL/L — SIGNIFICANT CHANGE UP (ref 3.5–5.3)
POTASSIUM SERPL-SCNC: 3.9 MMOL/L — SIGNIFICANT CHANGE UP (ref 3.5–5.3)
PROT SERPL-MCNC: 6.2 G/DL — SIGNIFICANT CHANGE UP (ref 6–8.3)
PROT SERPL-MCNC: 6.2 G/DL — SIGNIFICANT CHANGE UP (ref 6–8.3)
RBC # BLD: 4.44 M/UL — SIGNIFICANT CHANGE UP (ref 3.8–5.2)
RBC # FLD: 14.2 % — SIGNIFICANT CHANGE UP (ref 10.3–14.5)
RPR SERPL-ACNC: SIGNIFICANT CHANGE UP
SODIUM SERPL-SCNC: 139 MMOL/L — SIGNIFICANT CHANGE UP (ref 135–145)
T PALLIDUM AB TITR SER: POSITIVE
T PALLIDUM IGG SER QL IF: SIGNIFICANT CHANGE UP
WBC # BLD: 7.32 K/UL — SIGNIFICANT CHANGE UP (ref 3.8–10.5)
WBC # FLD AUTO: 7.32 K/UL — SIGNIFICANT CHANGE UP (ref 3.8–10.5)

## 2023-06-05 PROCEDURE — 99232 SBSQ HOSP IP/OBS MODERATE 35: CPT

## 2023-06-05 PROCEDURE — 93970 EXTREMITY STUDY: CPT | Mod: 26

## 2023-06-05 RX ORDER — INSULIN LISPRO 100/ML
5 VIAL (ML) SUBCUTANEOUS
Refills: 0 | Status: DISCONTINUED | OUTPATIENT
Start: 2023-06-05 | End: 2023-06-06

## 2023-06-05 RX ORDER — CARVEDILOL PHOSPHATE 80 MG/1
3.12 CAPSULE, EXTENDED RELEASE ORAL EVERY 12 HOURS
Refills: 0 | Status: DISCONTINUED | OUTPATIENT
Start: 2023-06-05 | End: 2023-06-08

## 2023-06-05 RX ORDER — INSULIN GLARGINE 100 [IU]/ML
7 INJECTION, SOLUTION SUBCUTANEOUS AT BEDTIME
Refills: 0 | Status: DISCONTINUED | OUTPATIENT
Start: 2023-06-05 | End: 2023-06-06

## 2023-06-05 RX ADMIN — CARVEDILOL PHOSPHATE 3.12 MILLIGRAM(S): 80 CAPSULE, EXTENDED RELEASE ORAL at 17:52

## 2023-06-05 RX ADMIN — ATORVASTATIN CALCIUM 80 MILLIGRAM(S): 80 TABLET, FILM COATED ORAL at 20:27

## 2023-06-05 RX ADMIN — Medication 50 MILLIGRAM(S): at 06:17

## 2023-06-05 RX ADMIN — APIXABAN 5 MILLIGRAM(S): 2.5 TABLET, FILM COATED ORAL at 17:52

## 2023-06-05 RX ADMIN — Medication 3 UNIT(S): at 17:53

## 2023-06-05 RX ADMIN — LISINOPRIL 40 MILLIGRAM(S): 2.5 TABLET ORAL at 06:19

## 2023-06-05 RX ADMIN — Medication 3 UNIT(S): at 06:38

## 2023-06-05 RX ADMIN — Medication 2: at 11:46

## 2023-06-05 RX ADMIN — Medication 3 UNIT(S): at 11:46

## 2023-06-05 RX ADMIN — APIXABAN 5 MILLIGRAM(S): 2.5 TABLET, FILM COATED ORAL at 06:17

## 2023-06-05 RX ADMIN — AMLODIPINE BESYLATE 10 MILLIGRAM(S): 2.5 TABLET ORAL at 06:17

## 2023-06-05 RX ADMIN — Medication 2: at 17:53

## 2023-06-05 RX ADMIN — INSULIN GLARGINE 7 UNIT(S): 100 INJECTION, SOLUTION SUBCUTANEOUS at 23:53

## 2023-06-05 RX ADMIN — Medication 2: at 22:08

## 2023-06-05 NOTE — PROGRESS NOTE ADULT - ASSESSMENT
64 y/o F with pmhx of [place] Palsy (left side), T2DM, CVA 2022 (left-sided deficits), CAD, HLD, HTN, Asthma, current smoker with a 52 pack year hx, ? AFib reportedly compliant on Eliquis who presented for evaluation of generalized weakness. Weakness started 5/21, got progressively worse 2-3 days ago. Daughter also noticed patient had been falling with occasional episodes of urinary incontinence. CTH with small vessel disease, CT C-spine negative for acute fx. Admitted to medicine for further w/u. Gen neuro consulted, recommended MRI brain w/o. Found to have R pontine infarct. Patient transferred to stroke tele for further management.     Neuro  #CVA workup  - continue home Eliquis 5 mg po bid   - dc'ed Plavix on 6/5  - continue atorvastatin 80mg daily  - q4hr stroke neuro checks and vitals  - MRI brain: small focus of restricted diffusion in the R petra, left pontomedullary junction and right frontal periventricular white matter  - Stroke Code HCT Results: small vessel disease  - MRA head without steno-occlusive disease, MRA neck without stenosis or occlusion  - f/u B12, folate  - kappa/lamda free light chain ratio: 1.66  - Stroke education    #urinary incontinence, ? hyperreflexia r/o cord compression   - CT C-spine: no fx   - MRI C/T/L spine without cord compression    Cards  #HTN  - permissive hypertension, Goal -180  - continue home Metop and Amlodipine. increased lisinopril from 20 to 40mg of 6/4  - obtain TTE with bubble  - may require IWONA if cannot confirm that patient is taking home Eliquis as instructed (daughter is unaware of if patient is)  - Stroke Code EKG Results: NSR with L axis deviation and occasional Q waves but no active ischemic changes    #HLD  - high dose statin as above in CVA  - LDL results: 162    Pulm  - call provider if SPO2 < 94%    GI  #Nutrition/Fluids/Electrolytes   - replete K<4 and Mg <2  - Diet: Soft and bite sized  - pending MBS  - IVF: none    Renal  #CKD   - Cr 1.7 on admission   - Will continue to trend  - attempted to gather collateral from patient's PCP regarding baseline SCr, unable to get in contact.     Infectious Disease  - Stroke Code CXR results: negative    Endocrine  #DM  - A1C results: 9.8  - ISS  - continue Lantus 10U, 3U premeal in addition to sliding scale  - appreciate endocrine recs    - TSH results: 2.000  - thyroid ultrasound: Dominant right thyroid lower pole nodule. Consider follow-up ultrasound   in one year. Additional multiple spongiform benign-appearing thyroid nodules.      DVT Prophylaxis  - continue Eliquis  - ordered LE dopplers for right greater than left lower extremity swelling, pending    Dispo: Acute Rehab     Discussed daily hospital plans and goals with patient    Discussed with Neurology Attending, Dr. Kyle Martinez   62 y/o F with pmhx of [place] Palsy (left side), T2DM, CVA 2022 (left-sided deficits), CAD, HLD, HTN, Asthma, current smoker with a 52 pack year hx, ? AFib reportedly compliant on Eliquis who presented for evaluation of generalized weakness. Weakness started 5/21, got progressively worse 2-3 days ago. Daughter also noticed patient had been falling with occasional episodes of urinary incontinence. CTH with small vessel disease, CT C-spine negative for acute fx. Admitted to medicine for further w/u. Gen neuro consulted, recommended MRI brain w/o. Found to have R pontine infarct. Patient transferred to stroke tele for further management.     Neuro  #CVA workup  - continue home Eliquis 5 mg po bid   - dc'ed Plavix on 6/5  - continue atorvastatin 80mg daily  - q4hr stroke neuro checks and vitals  - MRI brain: small focus of restricted diffusion in the R petra, left pontomedullary junction and right frontal periventricular white matter  - Stroke Code HCT Results: small vessel disease  - MRA head without steno-occlusive disease, MRA neck without stenosis or occlusion  - f/u B12, folate  - kappa/lamda free light chain ratio: 1.66  - Stroke education    #urinary incontinence, ? hyperreflexia r/o cord compression   - CT C-spine: no fx   - MRI C/T/L spine without cord compression    Cards  #HTN  - permissive hypertension, Goal -180  - continue home Metop and Amlodipine. increased lisinopril from 20 to 40mg of 6/4  - obtain TTE with bubble  - NPO at [facility] for IWONA  - Stroke Code EKG Results: NSR with L axis deviation and occasional Q waves but no active ischemic changes    #HLD  - high dose statin as above in CVA  - LDL results: 162    Pulm  - call provider if SPO2 < 94%    GI  #Nutrition/Fluids/Electrolytes   - replete K<4 and Mg <2  - Diet: Soft and bite sized  - pending MBS  - IVF: none    Renal  #CKD   - Cr 1.7 on admission   - Will continue to trend  - attempted to gather collateral from patient's PCP regarding baseline SCr, unable to get in contact.     Infectious Disease  - Stroke Code CXR results: negative    Endocrine  #DM  - A1C results: 9.8  - ISS  - continue Lantus 10U, 3U premeal in addition to sliding scale  - appreciate endocrine recs    - TSH results: 2.000  - thyroid ultrasound: Dominant right thyroid lower pole nodule. Consider follow-up ultrasound   in one year. Additional multiple spongiform benign-appearing thyroid nodules.      DVT Prophylaxis  - continue Eliquis  - ordered LE dopplers for right greater than left lower extremity swelling, pending    Dispo: Acute Rehab     Discussed daily hospital plans and goals with patient    Discussed with Neurology Attending, Dr. Kyle Martinez

## 2023-06-05 NOTE — PROGRESS NOTE ADULT - SUBJECTIVE AND OBJECTIVE BOX
Subjective/Interval HPI: Patient was seen and examined this morning. She still complains of generalized weakness. Denies chest pain, HA, dizziness, SOB. Appetite is moderate.    CAPILLARY BLOOD GLUCOSE & INSULIN RECEIVED  162 mg/dL (06-05 @ 17:41)  153 mg/dL (06-05 @ 11:29) - Lispro 3+2  106 mg/dL (06-05 @ 06:33) - Lispro 3+4. Ate eggs, no starch.  205 mg/dL (06-04 @ 21:11) - Lantus 10 + lispro 4  106 mg/dL (06-05 @ dinner) - Lispro 3+4. Ate potato and donut?    REVIEW OF SYSTEMS  Constitutional:  Negative fever, chills, Reports poor appetite.  Eyes:  Negative blurry vision or double vision.  Cardiovascular:  Negative for chest pain or palpitations.  Respiratory:  Negative for cough, wheezing, or shortness of breath.   Gastrointestinal:  Negative for nausea, vomiting, diarrhea, constipation, or abdominal pain.  Genitourinary:  Negative frequency, urgency or dysuria.  Neurologic:  No headache, confusion, dizziness, lightheadedness. +weakness     PHYSICAL EXAM  Vital Signs Last 24 Hrs  T(C): 36.3 (05 Jun 2023 18:01), Max: 37.1 (05 Jun 2023 13:54)  T(F): 97.3 (05 Jun 2023 18:01), Max: 98.7 (05 Jun 2023 13:54)  HR: 76 (05 Jun 2023 16:00) (61 - 76)  BP: 169/75 (05 Jun 2023 16:00) (142/65 - 174/77)  BP(mean): 108 (05 Jun 2023 16:00) (93 - 110)  RR: 18 (05 Jun 2023 16:00) (16 - 20)  SpO2: 95% (05 Jun 2023 16:00) (95% - 97%)    Parameters below as of 05 Jun 2023 16:00  Patient On (Oxygen Delivery Method): room air    Constitutional: Awake, alert, in no acute distress.   HEENT: Normocephalic, atraumatic, left sided drooping notes (bell's)  Neck: supple, no acanthosis, no thyromegaly or palpable thyroid nodules.  Respiratory: Lungs clear to ausculation bilaterally.   Cardiovascular: regular rhythm, normal S1 and S2, no audible murmurs.   GI: soft, non-tender, non-distended, bowel sounds present, no masses appreciated.  Extremities: trace peripheral edema, peripheral pulses present.   Skin: no rashes.   Psychiatric: AAO x 3. Normal affect/mood.     LABS  CBC - WBC/HGB/HTC/PLT: 7.32/11.9/35.3/231 (06-05-23)  BMP - Na/K/Cl/Bicarb/BUN/Cr/Gluc: 139/3.9/108/22/27/1.77/101 (06-05-23)  Anion Gap: 9 (06-05-23)  eGFR: 32 (06-05-23)  Calcium: 8.5 (06-05-23)  Phosphorus: 4.2 (06-05-23)  Magnesium: 2.0 (06-05-23)  LFT - Alb/Tprot/Tbili/Dbili/AlkPhos/ALT/AST: 3.2/6.2/0.3/--/98/11/11 (06-03-23)    Thyroid Stimulating Hormone, Serum: 1.670 (06-04-23)  Total T4/Free T4: --/1.150 (06-04-23)      MEDICATIONS  MEDICATIONS  (STANDING):  amLODIPine   Tablet 10 milliGRAM(s) Oral daily  apixaban 5 milliGRAM(s) Oral every 12 hours  atorvastatin 80 milliGRAM(s) Oral at bedtime  carvedilol 3.125 milliGRAM(s) Oral every 12 hours  dextrose 5%. 1000 milliLiter(s) (100 mL/Hr) IV Continuous <Continuous>  dextrose 5%. 1000 milliLiter(s) (50 mL/Hr) IV Continuous <Continuous>  dextrose 50% Injectable 25 Gram(s) IV Push once  dextrose 50% Injectable 25 Gram(s) IV Push once  dextrose 50% Injectable 12.5 Gram(s) IV Push once  glucagon  Injectable 1 milliGRAM(s) IntraMuscular once  insulin glargine Injectable (LANTUS) 10 Unit(s) SubCutaneous at bedtime  insulin lispro (ADMELOG) corrective regimen sliding scale   SubCutaneous Before meals and at bedtime  insulin lispro Injectable (ADMELOG) 3 Unit(s) SubCutaneous three times a day before meals  lisinopril 40 milliGRAM(s) Oral daily    MEDICATIONS  (PRN):  acetaminophen     Tablet .. 650 milliGRAM(s) Oral every 6 hours PRN Temp greater or equal to 38C (100.4F), Moderate Pain (4 - 6)  dextrose Oral Gel 15 Gram(s) Oral once PRN Blood Glucose LESS THAN 70 milliGRAM(s)/deciliter

## 2023-06-05 NOTE — PROGRESS NOTE ADULT - PROBLEM SELECTOR PLAN 1
Type 2 diabetes mellitus - uncontrolled with hyperglycemia  -likely medication non-compliance vs adjustment needed   Home regimen: 75/25 insulin 20 units QD, Metformin 1000mg BID and Rybelsus 3mg QD  - Please decrease lantus to 7 units at bedtime.   - Increase lispro to 5 units before each meal.  - Continue lispro moderate dose sliding scale four times daily with meals and at bedtime.  - Patient's fingerstick glucose goal is 100-180 mg/dL.    - For discharge, patient can TBD  - Patient to follow up with physician's in [place], has new endocrine appointment.

## 2023-06-05 NOTE — PROGRESS NOTE ADULT - ASSESSMENT
62 y/o F with pmhx of [place] Palsy (left side), T2DM, CVA 2022 (left-sided deficits), CAD, HLD, HTN, Asthma, current smoker with a 52 pack year hx, ? AFib reportedly compliant on Eliquis who presented for evaluation of generalized weakness. Found to have R pontine infarct. Endocrinology consulted for diabetes management.    A1C: 9.8 %  BUN: 24  Creatinine: 1.46  eGFR: 40Weight (kg): 96.6  BMI (kg/m2): 41.6  Ejection Fraction:

## 2023-06-05 NOTE — CONSULT NOTE ADULT - ASSESSMENT
IM    63 y o F with pmhx of [place] Palsy (left side), T2DM, CVA 2022 (left-sided deficits), CAD, HLD, HTN, Asthma, current smoker with a 52 pack year hx, ? AFib reportedly compliant on Eliquis who presented for evaluation of generalized weakness. Weakness started 5/21, got progressively worse 2-3 days ago. Daughter also noticed patient had been falling with occasional episodes of urinary incontinence. CTH with small vessel disease, CT C-spine negative for acute fx. Admitted to medicine for further w/u. Gen neuro consulted, recommended MRI brain w/o. Found to have R pontine infarct. Patient transferred to stroke tele for further management.     Neuro  #CVA workup  - continue home Eliquis 5 mg po bid   - continue plavix 75 mg po qd  - continue atorvastatin 80mg daily  - q4hr stroke neuro checks and vitals  - MRI brain: small focus of restricted diffusion in the R petra c/w acute ischemia  - Stroke Code HCT Results: small vessel disease  - MRA head without steno-occlusive disease, f/u MRA neck report  - f/u B12, folate, serum electrophoresis and immunofixation   - Stroke education    #urinary incontinence, ? hyperreflexia r/o cord compression   - CT C-spine: no fx   - MRI C/T/L spine without cord compression    Cards  #HTN  - permissive hypertension, Goal -180  - continue home Metop and Amlodipine. increased lisinopril from 20 to 40mg of 6/4  - obtain TTE with bubble  - Stroke Code EKG Results: NSR with L axis deviation and occasional Q waves but no active ischemic changes    #HLD  - high dose statin as above in CVA  - LDL results: 162    Pulm  - call provider if SPO2 < 94%    GI  #Nutrition/Fluids/Electrolytes   - replete K<4 and Mg <2  - Diet: Soft and bite sized  - pending MBS  - IVF: none    Renal  #CKD   - Cr 1.7 on admission   - Will continue to trend    Infectious Disease  - Stroke Code CXR results: negative    Endocrine  #DM  - A1C results: 9.8  - ISS  - continue Lantus 10U, 3U premeal in addition to sliding scale  - appreciate endocrine recs    - TSH results: 2.000  - thyroid ultrasound ordered for nodules seen on CT C-spine    DVT Prophylaxis  - continue Eliquis    Dispo: Acute Rehab     Discussed daily hospital plans and goals with patient

## 2023-06-05 NOTE — PROGRESS NOTE ADULT - SUBJECTIVE AND OBJECTIVE BOX
Neurology Stroke Progress Note    INTERVAL HPI/OVERNIGHT EVENTS:  Patient seen and examined.  number ______ used.    MEDICATIONS  (STANDING):  amLODIPine   Tablet 10 milliGRAM(s) Oral daily  apixaban 5 milliGRAM(s) Oral every 12 hours  atorvastatin 80 milliGRAM(s) Oral at bedtime  dextrose 5%. 1000 milliLiter(s) (100 mL/Hr) IV Continuous <Continuous>  dextrose 5%. 1000 milliLiter(s) (50 mL/Hr) IV Continuous <Continuous>  dextrose 50% Injectable 12.5 Gram(s) IV Push once  dextrose 50% Injectable 25 Gram(s) IV Push once  dextrose 50% Injectable 25 Gram(s) IV Push once  glucagon  Injectable 1 milliGRAM(s) IntraMuscular once  insulin glargine Injectable (LANTUS) 10 Unit(s) SubCutaneous at bedtime  insulin lispro (ADMELOG) corrective regimen sliding scale   SubCutaneous Before meals and at bedtime  insulin lispro Injectable (ADMELOG) 3 Unit(s) SubCutaneous three times a day before meals  lisinopril 40 milliGRAM(s) Oral daily  metoprolol succinate ER 50 milliGRAM(s) Oral daily    MEDICATIONS  (PRN):  acetaminophen     Tablet .. 650 milliGRAM(s) Oral every 6 hours PRN Temp greater or equal to 38C (100.4F), Moderate Pain (4 - 6)  dextrose Oral Gel 15 Gram(s) Oral once PRN Blood Glucose LESS THAN 70 milliGRAM(s)/deciliter      Allergies    codeine (Unknown)    Intolerances        Vital Signs Last 24 Hrs  T(C): 36.9 (05 Jun 2023 09:10), Max: 36.9 (05 Jun 2023 09:10)  T(F): 98.4 (05 Jun 2023 09:10), Max: 98.4 (05 Jun 2023 09:10)  HR: 64 (05 Jun 2023 08:10) (61 - 75)  BP: 158/67 (05 Jun 2023 08:10) (142/65 - 188/84)  BP(mean): 97 (05 Jun 2023 08:10) (93 - 120)  RR: 16 (05 Jun 2023 08:10) (16 - 18)  SpO2: 95% (05 Jun 2023 08:10) (95% - 98%)    Parameters below as of 05 Jun 2023 08:10  Patient On (Oxygen Delivery Method): room air        Physical exam:  General: No acute distress, awake and alert  Eyes: Anicteric sclerae, moist conjunctivae, see below for CNs  Neck: trachea midline, FROM, supple, no thyromegaly or lymphadenopathy  Cardiovascular: Regular rate and rhythm, no murmurs, rubs, or gallops. No carotid bruits.   Pulmonary: Anterior breath sounds clear bilaterally, no crackles or wheezing. No use of accessory muscles  GI: Abdomen soft, non-distended, non-tender  Extremities: Radial and DP pulses +2, no edema    Neurologic:  -Mental status: Awake, alert, oriented to person, place, and time. Speech is fluent with intact naming, repetition, and comprehension, no dysarthria. Recent and remote memory intact. Follows commands. Attention/concentration intact. Fund of knowledge appropriate.  -Cranial nerves:   II: Visual fields are full to confrontation.  III, IV, VI: Extraocular movements are intact without nystagmus. Pupils equally round and reactive to light  V:  Facial sensation V1-V3 equal and intact   VII: Face is symmetric with normal eye closure and smile  VIII: Hearing is bilaterally intact to finger rub  IX, X: Uvula is midline and soft palate rises symmetrically  XI: Head turning and shoulder shrug are intact.  XII: Tongue protrudes midline  Motor: Normal bulk and tone. No pronator drift. Strength bilateral upper extremity 5/5, bilateral lower extremities 5/5.  Rapid alternating movements intact and symmetric  Sensation: Intact to light touch bilaterally. No neglect or extinction on double simultaneous testing.  Coordination: No dysmetria on finger-to-nose and heel-to-shin bilaterally  Reflexes: Downgoing toes bilaterally   Gait: Narrow gait and steady    LABS:                        11.9   7.32  )-----------( 231      ( 05 Jun 2023 05:30 )             35.3     06-05    139  |  108  |  27<H>  ----------------------------<  101<H>  3.9   |  22  |  1.77<H>    Ca    8.5      05 Jun 2023 05:30  Phos  4.2     06-05  Mg     2.0     06-05            RADIOLOGY & ADDITIONAL TESTS:     Neurology Stroke Progress Note    INTERVAL HPI/OVERNIGHT EVENTS:  Spoke to patient's cardiology office. Was seeing Dr. Lamont Hale who she last saw in August 2021. Most recent progress note was faxed over --> patient had a cryptogenic right thalamic stroke in 9/2019 and underwent loop recorder implant on 1/31/20 for said stroke. LR was checked in 8/2020 with no evidence of AF. 3/2021 patient admitted to [facility] for acute left basal ganglia and bilateral frontal lobe stroke. Loop recorder again showed no evidence of afib. Because stroke was suspicious for cardioembolic phenomenon the patient was started on apaxiban.     MEDICATIONS  (STANDING):  amLODIPine   Tablet 10 milliGRAM(s) Oral daily  apixaban 5 milliGRAM(s) Oral every 12 hours  atorvastatin 80 milliGRAM(s) Oral at bedtime  dextrose 5%. 1000 milliLiter(s) (100 mL/Hr) IV Continuous <Continuous>  dextrose 5%. 1000 milliLiter(s) (50 mL/Hr) IV Continuous <Continuous>  dextrose 50% Injectable 12.5 Gram(s) IV Push once  dextrose 50% Injectable 25 Gram(s) IV Push once  dextrose 50% Injectable 25 Gram(s) IV Push once  glucagon  Injectable 1 milliGRAM(s) IntraMuscular once  insulin glargine Injectable (LANTUS) 10 Unit(s) SubCutaneous at bedtime  insulin lispro (ADMELOG) corrective regimen sliding scale   SubCutaneous Before meals and at bedtime  insulin lispro Injectable (ADMELOG) 3 Unit(s) SubCutaneous three times a day before meals  lisinopril 40 milliGRAM(s) Oral daily  metoprolol succinate ER 50 milliGRAM(s) Oral daily    MEDICATIONS  (PRN):  acetaminophen     Tablet .. 650 milliGRAM(s) Oral every 6 hours PRN Temp greater or equal to 38C (100.4F), Moderate Pain (4 - 6)  dextrose Oral Gel 15 Gram(s) Oral once PRN Blood Glucose LESS THAN 70 milliGRAM(s)/deciliter      Allergies    codeine (Unknown)    Intolerances        Vital Signs Last 24 Hrs  T(C): 36.9 (05 Jun 2023 09:10), Max: 36.9 (05 Jun 2023 09:10)  T(F): 98.4 (05 Jun 2023 09:10), Max: 98.4 (05 Jun 2023 09:10)  HR: 64 (05 Jun 2023 08:10) (61 - 75)  BP: 158/67 (05 Jun 2023 08:10) (142/65 - 188/84)  BP(mean): 97 (05 Jun 2023 08:10) (93 - 120)  RR: 16 (05 Jun 2023 08:10) (16 - 18)  SpO2: 95% (05 Jun 2023 08:10) (95% - 98%)    Parameters below as of 05 Jun 2023 08:10  Patient On (Oxygen Delivery Method): room air        Physical exam:  General: No acute distress, awake and alert  Eyes: Anicteric sclerae, moist conjunctivae, see below for CNs  Neck: trachea midline, FROM, supple, no thyromegaly or lymphadenopathy  Cardiovascular: Regular rate and rhythm, no murmurs, rubs, or gallops. No carotid bruits.   Pulmonary: Anterior breath sounds clear bilaterally, no crackles or wheezing. No use of accessory muscles  GI: Abdomen soft, non-distended, non-tender  Extremities: Radial and DP pulses +2, no edema    Neurologic:  -Mental status: Awake, alert, oriented to person, place, and time. Speech is fluent with intact naming, repetition, and comprehension, no dysarthria. Recent and remote memory intact. Follows commands. Attention/concentration intact. Fund of knowledge appropriate.  -Cranial nerves:   II: Visual fields are full to confrontation.  III, IV, VI: Extraocular movements are intact without nystagmus. Pupils equally round and reactive to light  V:  Facial sensation V1-V3 equal and intact   VII: Face is symmetric with normal eye closure and smile  VIII: Hearing is bilaterally intact to finger rub  IX, X: Uvula is midline and soft palate rises symmetrically  XI: Head turning and shoulder shrug are intact.  XII: Tongue protrudes midline  Motor: Normal bulk and tone. No pronator drift. Strength bilateral upper extremity 5/5, bilateral lower extremities 5/5.  Rapid alternating movements intact and symmetric  Sensation: Intact to light touch bilaterally. No neglect or extinction on double simultaneous testing.  Coordination: No dysmetria on finger-to-nose and heel-to-shin bilaterally  Reflexes: Downgoing toes bilaterally   Gait: Narrow gait and steady    LABS:                        11.9   7.32  )-----------( 231      ( 05 Jun 2023 05:30 )             35.3     06-05    139  |  108  |  27<H>  ----------------------------<  101<H>  3.9   |  22  |  1.77<H>    Ca    8.5      05 Jun 2023 05:30  Phos  4.2     06-05  Mg     2.0     06-05            RADIOLOGY & ADDITIONAL TESTS:     Neurology Stroke Progress Note    INTERVAL HPI/OVERNIGHT EVENTS:  Spoke to patient's cardiology office. Was seeing Dr. Lamont Hale who she last saw in August 2021. Most recent progress note was faxed over --> patient had a cryptogenic right thalamic stroke in 9/2019 and underwent loop recorder implant on 1/31/20 for said stroke. LR was checked in 8/2020 with no evidence of AF. 3/2021 patient admitted to [facility] for acute left basal ganglia and bilateral frontal lobe stroke. Loop recorder again showed no evidence of afib. Because stroke was suspicious for cardioembolic phenomenon the patient was started on apixaban. Attempted to call PCP office x 2 to obtain collateral regarding patient's baseline SCr, no answer.      MEDICATIONS  (STANDING):  amLODIPine   Tablet 10 milliGRAM(s) Oral daily  apixaban 5 milliGRAM(s) Oral every 12 hours  atorvastatin 80 milliGRAM(s) Oral at bedtime  dextrose 5%. 1000 milliLiter(s) (100 mL/Hr) IV Continuous <Continuous>  dextrose 5%. 1000 milliLiter(s) (50 mL/Hr) IV Continuous <Continuous>  dextrose 50% Injectable 12.5 Gram(s) IV Push once  dextrose 50% Injectable 25 Gram(s) IV Push once  dextrose 50% Injectable 25 Gram(s) IV Push once  glucagon  Injectable 1 milliGRAM(s) IntraMuscular once  insulin glargine Injectable (LANTUS) 10 Unit(s) SubCutaneous at bedtime  insulin lispro (ADMELOG) corrective regimen sliding scale   SubCutaneous Before meals and at bedtime  insulin lispro Injectable (ADMELOG) 3 Unit(s) SubCutaneous three times a day before meals  lisinopril 40 milliGRAM(s) Oral daily  metoprolol succinate ER 50 milliGRAM(s) Oral daily    MEDICATIONS  (PRN):  acetaminophen     Tablet .. 650 milliGRAM(s) Oral every 6 hours PRN Temp greater or equal to 38C (100.4F), Moderate Pain (4 - 6)  dextrose Oral Gel 15 Gram(s) Oral once PRN Blood Glucose LESS THAN 70 milliGRAM(s)/deciliter      Allergies    codeine (Unknown)    Intolerances        Vital Signs Last 24 Hrs  T(C): 36.9 (05 Jun 2023 09:10), Max: 36.9 (05 Jun 2023 09:10)  T(F): 98.4 (05 Jun 2023 09:10), Max: 98.4 (05 Jun 2023 09:10)  HR: 64 (05 Jun 2023 08:10) (61 - 75)  BP: 158/67 (05 Jun 2023 08:10) (142/65 - 188/84)  BP(mean): 97 (05 Jun 2023 08:10) (93 - 120)  RR: 16 (05 Jun 2023 08:10) (16 - 18)  SpO2: 95% (05 Jun 2023 08:10) (95% - 98%)    Parameters below as of 05 Jun 2023 08:10  Patient On (Oxygen Delivery Method): room air        Physical exam:  General: Appears to be in mild distress however remains awake and alert  Eyes: Anicteric sclerae, moist conjunctivae, see below for CNs  Extremities: RLE swelling > LLE    Neurologic:  -Mental status: Awake, alert, oriented to person and place, able to tell month with choices. Unable to tell the year. Speech is fluent with intact naming, repetition, and comprehension, mildly dysarthric. Follows simple and complex commands. Attention/concentration intact. Fund of knowledge appropriate.  -Cranial nerves:   II: Visual fields are full to confrontation.  III, IV, VI: Extraocular movements are intact without nystagmus. Pupils equally round and reactive to light  V: Decreased sensation along L V1-V3 (residual from her prior hx of Bell's palsy). Sensory deficits split down the middle of her face.  VII: R NLFF. Decreased forehead wrinkling on the L. Decreased strength with L eye when provider attempted to open (residual from pts hx of Bell's)  XII: Tongue protrudes midline  Motor: Normal bulk and tone. B/l UE 4/5 without drift. B/l LEs 3-/5 with drift, do not hit the bed.  Sensation: Intact to light touch bilaterally. No neglect or extinction on double simultaneous testing.  Coordination: No obvious dysmetria with finger-to-nose  Gait: Deferred    LABS:                        11.9   7.32  )-----------( 231      ( 05 Jun 2023 05:30 )             35.3     06-05    139  |  108  |  27<H>  ----------------------------<  101<H>  3.9   |  22  |  1.77<H>    Ca    8.5      05 Jun 2023 05:30  Phos  4.2     06-05  Mg     2.0     06-05            RADIOLOGY & ADDITIONAL TESTS:  < from: MR Angio Neck No Cont (06.03.23 @ 17:57) >    IMPRESSION:    No arterial steno-occlusive disease on neck MRA    < end of copied text >    < from: MR Angio Head No Cont (06.03.23 @ 17:57) >  INTERPRETATION:  ISpenser MD, have reviewed the images;   agree with the preliminary reported findings copied below the dashedline.    Time-of-flight MRA of the intracranial circulation is unremarkable. No   steno-occlusive focus.    Limited imaging through the brain includes diffusion imaging (DWI) which   is grossly unchanged from 06/02/2023 with recent lacunar infarcts within   the right side of the petra. Additional recent small vessel territory   infarcts seen in the left pontomedullary junction (series 11, image 9),   and one in the right frontal periventricular white matter (image 19).    < end of copied text >  < from: MR Angio Head No Cont (06.03.23 @ 17:57) >    IMPRESSION:  No stenosis or occlusion.    < end of copied text >

## 2023-06-05 NOTE — PROGRESS NOTE ADULT - PROBLEM SELECTOR PLAN 5
- continue eliquis and statin  - plavix discontinued  - will need to obtain further outpatient collateral info

## 2023-06-05 NOTE — PROGRESS NOTE ADULT - PROBLEM SELECTOR PLAN 6
Pt with BUN/Cr of 31/1.70 on admission. UA showing proteinuria and glucosuria.   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  If blood pressure improves with the addition of carvedilol, can try decreasing the dose of lisinopril

## 2023-06-05 NOTE — PROGRESS NOTE ADULT - SUBJECTIVE AND OBJECTIVE BOX
Feeling okay.  Unable to provide much history about why she is on eliquis.  Feeling a little better compared to when she came in.  Cousin is at the bedside during rounds.     Remaining ROS negative     PHYSICAL EXAM:    General: sitting up in chair, no acute distress  HEENT: MMM, residual L sided changes from bells palsy  Cardiovascular: +S1/S2, RRR  Respiratory: CTA B/L; no W/R/R  Gastrointestinal: soft, NT/ND; +BSx4  Extremities: WWP; RLE > LLE swelling  Psychiatric: pleasant mood and affect  Dermatologic: no appreciable wounds or damage to the skin    VITAL SIGNS:  Vital Signs Last 24 Hrs  T(C): 37.1 (05 Jun 2023 13:54), Max: 37.1 (05 Jun 2023 13:54)  T(F): 98.7 (05 Jun 2023 13:54), Max: 98.7 (05 Jun 2023 13:54)  HR: 76 (05 Jun 2023 16:00) (61 - 76)  BP: 169/75 (05 Jun 2023 16:00) (142/65 - 179/81)  BP(mean): 108 (05 Jun 2023 16:00) (93 - 116)  RR: 18 (05 Jun 2023 16:00) (16 - 20)  SpO2: 95% (05 Jun 2023 16:00) (95% - 98%)    Parameters below as of 05 Jun 2023 16:00  Patient On (Oxygen Delivery Method): room air          MEDICATIONS:  MEDICATIONS  (STANDING):  amLODIPine   Tablet 10 milliGRAM(s) Oral daily  apixaban 5 milliGRAM(s) Oral every 12 hours  atorvastatin 80 milliGRAM(s) Oral at bedtime  carvedilol 3.125 milliGRAM(s) Oral every 12 hours  dextrose 5%. 1000 milliLiter(s) (100 mL/Hr) IV Continuous <Continuous>  dextrose 5%. 1000 milliLiter(s) (50 mL/Hr) IV Continuous <Continuous>  dextrose 50% Injectable 25 Gram(s) IV Push once  dextrose 50% Injectable 25 Gram(s) IV Push once  dextrose 50% Injectable 12.5 Gram(s) IV Push once  glucagon  Injectable 1 milliGRAM(s) IntraMuscular once  insulin glargine Injectable (LANTUS) 10 Unit(s) SubCutaneous at bedtime  insulin lispro (ADMELOG) corrective regimen sliding scale   SubCutaneous Before meals and at bedtime  insulin lispro Injectable (ADMELOG) 3 Unit(s) SubCutaneous three times a day before meals  lisinopril 40 milliGRAM(s) Oral daily    MEDICATIONS  (PRN):  acetaminophen     Tablet .. 650 milliGRAM(s) Oral every 6 hours PRN Temp greater or equal to 38C (100.4F), Moderate Pain (4 - 6)  dextrose Oral Gel 15 Gram(s) Oral once PRN Blood Glucose LESS THAN 70 milliGRAM(s)/deciliter      ALLERGIES:  Allergies    codeine (Unknown)    Intolerances        LABS:                        11.9   7.32  )-----------( 231      ( 05 Jun 2023 05:30 )             35.3     06-05    139  |  108  |  27<H>  ----------------------------<  101<H>  3.9   |  22  |  1.77<H>    Ca    8.5      05 Jun 2023 05:30  Phos  4.2     06-05  Mg     2.0     06-05          CAPILLARY BLOOD GLUCOSE      POCT Blood Glucose.: 153 mg/dL (05 Jun 2023 11:29)      RADIOLOGY & ADDITIONAL TESTS: Reviewed.

## 2023-06-05 NOTE — CONSULT NOTE ADULT - SUBJECTIVE AND OBJECTIVE BOX
Patient is a 63y old  Female who presents with a chief complaint of CVA (03 Jun 2023 11:52)      HPI:  Ms. Hendricks is a 62 y/o F with a PMHx of [place] Palsy (on the left side), TIIDM, CVA (left side weak), CAD, HLD, HTN, Asthma, current smoker with a 52 pack year hx, brought for evaluation of worsening generalized weakness since 5/21. Pt states she has had a CVA in the past with residual L sided weakness in her leg and arm, however, pt began to notice continual weakness in both her upper and lower extremities. Pt states she was taken to an ER in [place] for pain in her L ankle and was d/c after no DVT was discovered. Since then, pt states she lost her balance upon getting out of bed 2x and presented to ER at [facility] 6/2 for further evaluation. Pt states she has had increasingly difficulty in using her wrists and ankles. Pt denies diarrheal illness previously, denies SOB and cough, palpitations and CP, headaches, fevers, chills, nausea, vomiting, diarrhea, constipation, dysuria, hematuria, hematochezia. Pt noted to have had some urinary hesitancy. Daughter Rosemarie at bedside [phone], states her mother has poor compliance with her medications, states she has mostly sedentary lifestyle.     ED Course:  ED Vitals: Initial: T 97.8, HR 70, /82, satting 99% on RA   Notable labs: WBC 7.98, Hgb/Hct 12.7/37.2, platelets 242; Na 137, K 4.7, Cl 105, CO2 24, BUN/Cr 31/1.70, Glucose 360, troponin 0.01   UA: Protein, small blood, RBC, bacteria, + Glucose   CXR: No evidence of acute cardiopulmonary disease  CTH: No acute intracranial hemorrhage, mass effect, or demarcated recent infarction, small vessel ischemic change throughout cerebral white matter and deep gray/white matter lacunae.  CT Cervical Spine: No fracture   EKG: NSR with L axis deviation and occasional Q waves but no active ischemic changes   Pt was admitted to [facility] for further workup of generalized weakness (02 Jun 2023 17:54)    PAST MEDICAL & SURGICAL HISTORY:    MEDICATIONS  (STANDING):  amLODIPine   Tablet 10 milliGRAM(s) Oral daily  apixaban 5 milliGRAM(s) Oral every 12 hours  atorvastatin 80 milliGRAM(s) Oral at bedtime  clopidogrel Tablet 75 milliGRAM(s) Oral daily  dextrose 5%. 1000 milliLiter(s) (100 mL/Hr) IV Continuous <Continuous>  dextrose 5%. 1000 milliLiter(s) (50 mL/Hr) IV Continuous <Continuous>  dextrose 50% Injectable 25 Gram(s) IV Push once  dextrose 50% Injectable 25 Gram(s) IV Push once  dextrose 50% Injectable 12.5 Gram(s) IV Push once  glucagon  Injectable 1 milliGRAM(s) IntraMuscular once  insulin glargine Injectable (LANTUS) 10 Unit(s) SubCutaneous at bedtime  insulin lispro (ADMELOG) corrective regimen sliding scale   SubCutaneous Before meals and at bedtime  insulin lispro Injectable (ADMELOG) 3 Unit(s) SubCutaneous three times a day before meals  lisinopril 40 milliGRAM(s) Oral daily  metoprolol succinate ER 50 milliGRAM(s) Oral daily    MEDICATIONS  (PRN):  acetaminophen     Tablet .. 650 milliGRAM(s) Oral every 6 hours PRN Temp greater or equal to 38C (100.4F), Moderate Pain (4 - 6)  dextrose Oral Gel 15 Gram(s) Oral once PRN Blood Glucose LESS THAN 70 milliGRAM(s)/deciliter          Social History:              -  Home Living Status :  lives with her son and daughters in a private house, & [facility]          -  Prior Home Care Services :  none       Baseline Functional Level Prior to Admission :             - ADL's/ IADL's : requires partial assistance of daughters          - Ambulatory status prior to admission :   walked with a cane         FAMILY HISTORY:      CBC Full  -  ( 05 Jun 2023 05:30 )  WBC Count : 7.32 K/uL  RBC Count : 4.44 M/uL  Hemoglobin : 11.9 g/dL  Hematocrit : 35.3 %  Platelet Count - Automated : 231 K/uL  Mean Cell Volume : 79.5 fl  Mean Cell Hemoglobin : 26.8 pg  Mean Cell Hemoglobin Concentration : 33.7 gm/dL  Auto Neutrophil # : x  Auto Lymphocyte # : x  Auto Monocyte # : x  Auto Eosinophil # : x  Auto Basophil # : x  Auto Neutrophil % : x  Auto Lymphocyte % : x  Auto Monocyte % : x  Auto Eosinophil % : x  Auto Basophil % : x      06-05    139  |  108  |  27<H>  ----------------------------<  101<H>  3.9   |  22  |  1.77<H>    Ca    8.5      05 Jun 2023 05:30  Phos  4.2     06-05  Mg     2.0     06-05            Radiology :     < from: MR Head No Cont (06.03.23 @ 00:04) >  ACC: [id number] EXAM:  MR BRAIN   ORDERED BY: FRANCE GOLD     PROCEDURE DATE:  06/03/2023          INTERPRETATION:  Neftaly BETANCOURT MD, have reviewed the images and   the report and agree with the findings.    COMPARISON: CT head 6/2/2023 at 2:55 PM.    ******PRELIMINARY REPORT******    PROCEDURE: MRI BRAIN WITHOUT CONTRAST    INDICATION: Rule out stroke    TECHNIQUE: Axial DWI and T2/FLAIR images of the brain were obtained.    COMPARISON: None available    FINDINGS:    There is a small focus of restricted diffusion in the right petra,   consistent with acute ischemia. There are multiple scattered areas of   FLAIR signal hyperintensity in the periventricular and subcortical white   matter, nonspecific but most likely resultof small vessel ischemic   disease.    The ventricles, cisternal spaces, and cortical sulci to be appropriate   for the patient's stated age. There is no midline shift or extra-axial   collection.    The visualized paranasal sinuses are free of mucosal disease. The mastoid   air cells are well-aerated.    IMPRESSION:    Small focus of restricted diffusion in the right petra, consistent with   acute ischemia.      < from: MR Thoracic Spine No Cont (06.03.23 @ 00:05) >  ACC: [id number] EXAM:  MR SPINE THORACIC   ORDERED BY: FRANCE GOLD     PROCEDURE DATE:  06/03/2023          INTERPRETATION:  PROCEDURE: MRI thoracic spine    INDICATION: Weakness    TECHNIQUE: Sagittal T1, T2, STIR and only T2 weightedimages of the   thoracic were obtained as the patient could no longer continue with   scanning.    COMPARISON: None    FINDINGS: The MRI examination demonstrates the thoracic alignment to be   intact. The vertebral body heights and intervertebral disc spaces are   maintained. There are small hemangiomas at T7 and T11. The rest of the   vertebral body marrow signal is appropriate for the patient's stated age.   No abnormal signal is identified within the thoracic cord. The conus   medullaris ends at the L1/2 level.    At the T3/4 level, there is a small to moderate right paracentral disc   herniation. There is no spinal canal stenosis or neural foramen narrowing.    At the T7/8 level, there is a small right paracentral disc herniation.   Thereis no spinal canal stenosis or neural foramen narrowing.    At the T8/9 level, there is a small central disc herniation. There is no   spinal canal stenosis or neural foramen narrowing.    Limited evaluation of the neck demonstrates T2 hyperintense lesions   within each lobe of the thyroid gland. The largest on the left measures   approximately 14 mm (series 16 image 68).    IMPRESSION: Small to moderate right paracentral disc herniation T3/4,   small cysts right paracentral disc herniation T7/8,and small central   disc herniation T8/9.    < from: MR Lumbar Spine No Cont (06.03.23 @ 00:06) >    ACC: [id number] EXAM:  MR SPINE LUMBAR   ORDERED BY: FRANCE GOLD     PROCEDURE DATE:  06/03/2023          INTERPRETATION:  PROCEDURE: MRI of the lumbosacral spine without contrast    INDICATION: Weakness    TECHNIQUE: Sagittal T1, T2, and STIR and axial T2 weighted images of the   lumbosacral spine were obtained as the patient could no longer continue   with scanning.    COMPARISON: None    FINDINGS: The MRI examination demonstrates the lumbosacral alignment to   be intact. The vertebral body heights and intervertebral disc spaces are   maintained. The vertebral body marrow signal is appropriate for the   patient's stated age. There is narrowing of the AP diameter of the spinal   canal which may be secondary to underlying congenital spinal stenosis.   The conus medullaris ends at L1/2.    At the L1/2 level, there is no disc herniation. There is no neural   foramen narrowing.    At the L2/3 level, there is no disc herniation. There are degenerative   changes involving the facets bilaterally There is no neural foramen   narrowing.    At the L3/4 level, there is no disc herniation. There are degenerative   changes involving the facets bilaterally. There is no neural foramen   narrowing.    At the L4/5 level, there is no disc herniation. There are degenerative   changes involving the facets bilaterally. These findings contribute to   moderate spinal canal stenosis. There is no neural foramen narrowing.    At the L5/S1 level, there is right paracentral annular fissure without   jose disc herniation. There are degenerative changes involving the   facets bilaterally. These findings contribute to moderate spinal canal   stenosis. There is mild bilateral neural foramen narrowing.    IMPRESSION: Degenerative facet disc disease at L4/5 and L5/S1   contributing to the underlying congenital spinal canal stenosis.           REVIEW OF SYSTEMS:      CONSTITUTIONAL: No fever, weight loss, or fatigue  EYES: No eye pain, visual disturbances, or discharge  ENMT:  No difficulty hearing, tinnitus, vertigo; No sinus or throat pain  NECK: No pain or stiffness  BREASTS: No pain, masses, or nipple discharge  RESPIRATORY: No cough, wheezing, chills or hemoptysis; No shortness of breath  CARDIOVASCULAR: No chest pain, palpitations, dizziness, or leg swelling  GASTROINTESTINAL: No abdominal or epigastric pain. No nausea, vomiting, or hematemesis; No diarrhea or constipation. No melena or hematochezia.  GENITOURINARY: No dysuria, frequency, hematuria, or incontinence  NEUROLOGICAL:  per hpi   SKIN: No itching, burning, rashes, or lesions   LYMPH NODES: No enlarged glands  ENDOCRINE: No heat or cold intolerance; No hair loss  MUSCULOSKELETAL: No joint pain or swelling; No muscle, back, or extremity pain  PSYCHIATRIC: No depression, anxiety, mood swings, or difficulty sleeping  HEME/LYMPH: No easy bruising, or bleeding gums  ALLERGY AND IMMUNOLOGIC: No hives or eczema  VASCULAR: no swelling , erythema        Vital Signs Last 24 Hrs  T(C): 36.6 (05 Jun 2023 05:19), Max: 36.7 (04 Jun 2023 22:03)  T(F): 97.8 (05 Jun 2023 05:19), Max: 98 (04 Jun 2023 22:03)  HR: 75 (05 Jun 2023 04:16) (61 - 75)  BP: 155/69 (05 Jun 2023 04:16) (142/65 - 188/84)  BP(mean): 99 (05 Jun 2023 04:16) (93 - 120)  RR: 18 (05 Jun 2023 04:16) (16 - 18)  SpO2: 96% (05 Jun 2023 04:16) (95% - 98%)    Parameters below as of 05 Jun 2023 04:16  Patient On (Oxygen Delivery Method): room air            Physical Exam :   63 y o woman lying comfortably in semi Khan's position , awake , alert , no acute complaints      Head : normocephalic , atraumatic    Eyes : PERRLA , EOMI , no nystagmus , sclera anicteric    ENT / FACE : neg nasal discharge , uvula midline , no oropharyngeal erythema / exudate    Neck : supple , negative JVD , negative carotid bruits , no thyromegaly    Chest : CTA bilaterally     Cardiovascular : regular rate and rhythm , neg murmurs / rubs / gallops    Abdomen : soft , non distended , non tender to palpation in all 4 quadrants ,  normal bowel sounds     Extremities : WWP , neg cyanosis /clubbing / edema      Neurologic Exam :    Alert and oriented to person , place , date/year , speech fluent w/o dysarthria , follows commands , recent and remote memory intact , repetition intact , comprehension intact ,  attention/concentration intact , fund of knowledge appropriate    Cranial Nerves:     II :                         pupils equal , round and reactive to light , visual fields intact   III/ IV/VI :              extraocular movements intact , neg nystagmus , neg ptosis  V :                        facial sensation intact , V1-3 normal  VII :                      L droop , normal eye closure and smile  VIII :                     hearing intact to finger rub bilaterally  IX and X :             no hoarseness , gag intact , palate/ uvula rise symmetrically  XI :                       SCM / trapezius strength intact bilateral  XII :                      no tongue deviation    Motor Exam:          Right UE/LE:            > 4/5        Left UE/LE :          3/5 range                  Sensation :         intact to light touch x 4 extremities                            no neglect or extinction on double simultaneous testing      DTR :     biceps/brachioradialis : equal                      patella/ankle : equal     neg Babinski        Gait :  not tested          PM&R Impression :         -  s/p acute R pontine infarct         Recommendations / Plan :              1) Physical / Occupational therapy focusing on therapeutic exercises , equipment evaluation , bed mobility/transfer out of bed evaluation , progressive      Disposition plan recommendation : Patient is an excellent candidate for acute rehab placement.    - Patient has an acute rehab diagnosis .    - Patient is extremely motivated in rehab and is actively participating in PT and OT and requires ongoing multiple therapy disciplines .    - Patient is not at baseline functional level .    - Patient requires an intensive inpatient acute rehabilitation therapy and can tolerate > 3 hrs of combined PT and OT per day and at least 5 days per week with a reasonable expectation that the patient will make measurable functional improvement that only an acute rehab program can provide .    - Patient requires Physiatry supervision specializing in acute inpatient rehab care .     - Has strong family support for post acute rehab stay .

## 2023-06-05 NOTE — PROGRESS NOTE ADULT - ASSESSMENT
63F with PMH of [place] Palsy, DM2, CVA (left side weak), CAD, HLD, HTN, Asthma noted to have acute R pontine stroke.  On stroke service with medical comanagment

## 2023-06-05 NOTE — PROGRESS NOTE ADULT - PROBLEM SELECTOR PLAN 7
F: Tolerating oral   GI: None  DVT: on eliquis  Code: Full code  Dispo: AR    >35 minutes spent on this encounter, including face to face with patient, care coordination and documentation.

## 2023-06-05 NOTE — PROGRESS NOTE ADULT - NS ATTEND AMEND GEN_ALL_CORE FT
Pt seen on rounds this afternoon and events of the weekend reviewed.  63-yo woman with a history of HTN, HLP, type 2 DM and previous CVA (with residual left-sided weakness) presented with complaint of generalized weakness and was found to have a new R pontine infarct.  DM is managed at home with a single AM dose of 20 units of Humalog 75/25 plus metformin and (for the past 3 months) Rybelsus.  Rarely monitors fingersticks at home and A1c level markedly elevated at 9.8%.  Is only partially adherent to diet, which likely includes excessive quantities of starch.  PO intake in the hospital has been fair  Was started on 10 Lantus plus 3 units premeal when seen by Dr. Boston on 6/3/23.  Glucoses have been mostly in target range today, though had 2/4 values in the 200 range yesterday.  --Given the drop in blood sugar overnight from 205 to 106 this morning with 10 units Lantus, to decrease to 7 units  --Her PO intake has been fairly good, and post-prandial fingersticks are still above target--increase the premeal lispro to 5 units  --Continue moderate dose coverage

## 2023-06-05 NOTE — PROGRESS NOTE ADULT - PROBLEM SELECTOR PLAN 3
takes Lisinopril 40mg qd, Amlodipine 10mg qd, and Metoprolol 50mg qd  - blood pressure continues to be elevated, after resumption of lisinopril  - creatinine increased, but not >0.3, so therefore not JUAN JOSÉ - will need to obtain collateral information about renal function  - changed metoprolol to carvedilol for better management of hypertension

## 2023-06-05 NOTE — PROGRESS NOTE ADULT - PROBLEM SELECTOR PLAN 2
1 week history of urinary incontinence in addition to generalized weakness and dysarthria as noted above.  - please check bladder scan in the event that patient has issues with urinary retention and resultant overflow incontinence.  UA does not appear infected.   - outpatient follow up  - no pathology on MRI

## 2023-06-05 NOTE — PROGRESS NOTE ADULT - PROBLEM SELECTOR PLAN 1
MRI with short stroke protocol revealed acute ischemia in the right petra.  - continue on eliquis 5mg BID - still unable to obtain collateral information from PCP.  Team tried multiple times and daughter is not aware of indication for eliquis  - no longer taking plavix in the hospital  - will need IWONA to evaluate for cardiac source of clot, as well as PFO

## 2023-06-05 NOTE — PROGRESS NOTE ADULT - PROBLEM SELECTOR PLAN 4
continue with sliding scale  continue with 3 premeal, sliding scale and 10U of lantus.  - within goal of 140-180

## 2023-06-06 DIAGNOSIS — E04.1 NONTOXIC SINGLE THYROID NODULE: ICD-10-CM

## 2023-06-06 LAB
% ALBUMIN: 53.7 % — SIGNIFICANT CHANGE UP
% ALPHA 1: 4.3 % — SIGNIFICANT CHANGE UP
% ALPHA 2: 12.4 % — SIGNIFICANT CHANGE UP
% BETA: 12.9 % — SIGNIFICANT CHANGE UP
% GAMMA: 16.7 % — SIGNIFICANT CHANGE UP
ALBUMIN SERPL ELPH-MCNC: 3.3 G/DL — LOW (ref 3.6–5.5)
ALBUMIN/GLOB SERPL ELPH: 1.1 RATIO — SIGNIFICANT CHANGE UP
ALPHA1 GLOB SERPL ELPH-MCNC: 0.3 G/DL — SIGNIFICANT CHANGE UP (ref 0.1–0.4)
ALPHA2 GLOB SERPL ELPH-MCNC: 0.8 G/DL — SIGNIFICANT CHANGE UP (ref 0.5–1)
ANION GAP SERPL CALC-SCNC: 9 MMOL/L — SIGNIFICANT CHANGE UP (ref 5–17)
B-GLOBULIN SERPL ELPH-MCNC: 0.8 G/DL — SIGNIFICANT CHANGE UP (ref 0.5–1)
BUN SERPL-MCNC: 37 MG/DL — HIGH (ref 7–23)
CALCIUM SERPL-MCNC: 8.4 MG/DL — SIGNIFICANT CHANGE UP (ref 8.4–10.5)
CHLORIDE SERPL-SCNC: 108 MMOL/L — SIGNIFICANT CHANGE UP (ref 96–108)
CO2 SERPL-SCNC: 22 MMOL/L — SIGNIFICANT CHANGE UP (ref 22–31)
CREAT SERPL-MCNC: 1.77 MG/DL — HIGH (ref 0.5–1.3)
EGFR: 32 ML/MIN/1.73M2 — LOW
GAMMA GLOBULIN: 1 G/DL — SIGNIFICANT CHANGE UP (ref 0.6–1.6)
GLUCOSE BLDC GLUCOMTR-MCNC: 105 MG/DL — HIGH (ref 70–99)
GLUCOSE BLDC GLUCOMTR-MCNC: 130 MG/DL — HIGH (ref 70–99)
GLUCOSE BLDC GLUCOMTR-MCNC: 184 MG/DL — HIGH (ref 70–99)
GLUCOSE BLDC GLUCOMTR-MCNC: 211 MG/DL — HIGH (ref 70–99)
GLUCOSE SERPL-MCNC: 104 MG/DL — HIGH (ref 70–99)
HCT VFR BLD CALC: 34.3 % — LOW (ref 34.5–45)
HGB BLD-MCNC: 11.6 G/DL — SIGNIFICANT CHANGE UP (ref 11.5–15.5)
INTERPRETATION SERPL IFE-IMP: SIGNIFICANT CHANGE UP
MAGNESIUM SERPL-MCNC: 2.1 MG/DL — SIGNIFICANT CHANGE UP (ref 1.6–2.6)
MCHC RBC-ENTMCNC: 26.9 PG — LOW (ref 27–34)
MCHC RBC-ENTMCNC: 33.8 GM/DL — SIGNIFICANT CHANGE UP (ref 32–36)
MCV RBC AUTO: 79.4 FL — LOW (ref 80–100)
NRBC # BLD: 0 /100 WBCS — SIGNIFICANT CHANGE UP (ref 0–0)
PHOSPHATE SERPL-MCNC: 4.8 MG/DL — HIGH (ref 2.5–4.5)
PLATELET # BLD AUTO: 216 K/UL — SIGNIFICANT CHANGE UP (ref 150–400)
POTASSIUM SERPL-MCNC: 4.1 MMOL/L — SIGNIFICANT CHANGE UP (ref 3.5–5.3)
POTASSIUM SERPL-SCNC: 4.1 MMOL/L — SIGNIFICANT CHANGE UP (ref 3.5–5.3)
PROT PATTERN SERPL ELPH-IMP: SIGNIFICANT CHANGE UP
RBC # BLD: 4.32 M/UL — SIGNIFICANT CHANGE UP (ref 3.8–5.2)
RBC # FLD: 14 % — SIGNIFICANT CHANGE UP (ref 10.3–14.5)
SODIUM SERPL-SCNC: 139 MMOL/L — SIGNIFICANT CHANGE UP (ref 135–145)
WBC # BLD: 6.96 K/UL — SIGNIFICANT CHANGE UP (ref 3.8–10.5)
WBC # FLD AUTO: 6.96 K/UL — SIGNIFICANT CHANGE UP (ref 3.8–10.5)

## 2023-06-06 PROCEDURE — 93306 TTE W/DOPPLER COMPLETE: CPT | Mod: 26

## 2023-06-06 PROCEDURE — 74230 X-RAY XM SWLNG FUNCJ C+: CPT | Mod: 26

## 2023-06-06 PROCEDURE — 93312 ECHO TRANSESOPHAGEAL: CPT | Mod: 26

## 2023-06-06 PROCEDURE — 99232 SBSQ HOSP IP/OBS MODERATE 35: CPT

## 2023-06-06 RX ORDER — INSULIN LISPRO 100/ML
7 VIAL (ML) SUBCUTANEOUS
Refills: 0 | Status: DISCONTINUED | OUTPATIENT
Start: 2023-06-06 | End: 2023-06-06

## 2023-06-06 RX ORDER — CLOPIDOGREL BISULFATE 75 MG/1
75 TABLET, FILM COATED ORAL DAILY
Refills: 0 | Status: DISCONTINUED | OUTPATIENT
Start: 2023-06-06 | End: 2023-06-13

## 2023-06-06 RX ORDER — LABETALOL HCL 100 MG
5 TABLET ORAL ONCE
Refills: 0 | Status: COMPLETED | OUTPATIENT
Start: 2023-06-06 | End: 2023-06-06

## 2023-06-06 RX ORDER — INSULIN LISPRO 100/ML
5 VIAL (ML) SUBCUTANEOUS
Refills: 0 | Status: DISCONTINUED | OUTPATIENT
Start: 2023-06-06 | End: 2023-06-07

## 2023-06-06 RX ORDER — ASPIRIN/CALCIUM CARB/MAGNESIUM 324 MG
81 TABLET ORAL DAILY
Refills: 0 | Status: DISCONTINUED | OUTPATIENT
Start: 2023-06-06 | End: 2023-06-13

## 2023-06-06 RX ORDER — ENOXAPARIN SODIUM 100 MG/ML
40 INJECTION SUBCUTANEOUS EVERY 12 HOURS
Refills: 0 | Status: DISCONTINUED | OUTPATIENT
Start: 2023-06-06 | End: 2023-06-08

## 2023-06-06 RX ORDER — SODIUM CHLORIDE 9 MG/ML
1000 INJECTION INTRAMUSCULAR; INTRAVENOUS; SUBCUTANEOUS
Refills: 0 | Status: DISCONTINUED | OUTPATIENT
Start: 2023-06-06 | End: 2023-06-07

## 2023-06-06 RX ORDER — POLYETHYLENE GLYCOL 3350 17 G/17G
17 POWDER, FOR SOLUTION ORAL DAILY
Refills: 0 | Status: DISCONTINUED | OUTPATIENT
Start: 2023-06-06 | End: 2023-06-13

## 2023-06-06 RX ORDER — INSULIN GLARGINE 100 [IU]/ML
5 INJECTION, SOLUTION SUBCUTANEOUS AT BEDTIME
Refills: 0 | Status: DISCONTINUED | OUTPATIENT
Start: 2023-06-06 | End: 2023-06-09

## 2023-06-06 RX ORDER — HYDRALAZINE HCL 50 MG
5 TABLET ORAL ONCE
Refills: 0 | Status: COMPLETED | OUTPATIENT
Start: 2023-06-06 | End: 2023-06-06

## 2023-06-06 RX ORDER — SENNA PLUS 8.6 MG/1
2 TABLET ORAL AT BEDTIME
Refills: 0 | Status: DISCONTINUED | OUTPATIENT
Start: 2023-06-06 | End: 2023-06-13

## 2023-06-06 RX ADMIN — Medication 5 MILLIGRAM(S): at 01:15

## 2023-06-06 RX ADMIN — LISINOPRIL 40 MILLIGRAM(S): 2.5 TABLET ORAL at 06:04

## 2023-06-06 RX ADMIN — ATORVASTATIN CALCIUM 80 MILLIGRAM(S): 80 TABLET, FILM COATED ORAL at 21:05

## 2023-06-06 RX ADMIN — Medication 4: at 16:23

## 2023-06-06 RX ADMIN — ENOXAPARIN SODIUM 40 MILLIGRAM(S): 100 INJECTION SUBCUTANEOUS at 17:59

## 2023-06-06 RX ADMIN — SODIUM CHLORIDE 80 MILLILITER(S): 9 INJECTION INTRAMUSCULAR; INTRAVENOUS; SUBCUTANEOUS at 14:24

## 2023-06-06 RX ADMIN — CARVEDILOL PHOSPHATE 3.12 MILLIGRAM(S): 80 CAPSULE, EXTENDED RELEASE ORAL at 17:59

## 2023-06-06 RX ADMIN — Medication 5 MILLIGRAM(S): at 17:59

## 2023-06-06 RX ADMIN — Medication 2: at 21:47

## 2023-06-06 RX ADMIN — AMLODIPINE BESYLATE 10 MILLIGRAM(S): 2.5 TABLET ORAL at 06:05

## 2023-06-06 RX ADMIN — INSULIN GLARGINE 5 UNIT(S): 100 INJECTION, SOLUTION SUBCUTANEOUS at 21:45

## 2023-06-06 RX ADMIN — CLOPIDOGREL BISULFATE 75 MILLIGRAM(S): 75 TABLET, FILM COATED ORAL at 17:59

## 2023-06-06 RX ADMIN — APIXABAN 5 MILLIGRAM(S): 2.5 TABLET, FILM COATED ORAL at 06:04

## 2023-06-06 RX ADMIN — CARVEDILOL PHOSPHATE 3.12 MILLIGRAM(S): 80 CAPSULE, EXTENDED RELEASE ORAL at 06:05

## 2023-06-06 RX ADMIN — Medication 5 UNIT(S): at 16:23

## 2023-06-06 RX ADMIN — SENNA PLUS 2 TABLET(S): 8.6 TABLET ORAL at 21:05

## 2023-06-06 RX ADMIN — Medication 5 UNIT(S): at 12:02

## 2023-06-06 RX ADMIN — Medication 81 MILLIGRAM(S): at 17:59

## 2023-06-06 NOTE — SWALLOW VFSS/MBS ASSESSMENT ADULT - SPECIFY REASON(S)
Objective assessment indicated given overt s/s of aspiration per bedside eval along with previous and acute neurologic hx

## 2023-06-06 NOTE — SWALLOW VFSS/MBS ASSESSMENT ADULT - CONSISTENCIES ADMINISTERED
Thin liquid x1 tsp x3 cup sip, mildly thick x1 cup sip, puree x2, soft/bite size x1 bite/thin liquid/mildly thick/pureed/soft & bite-sized

## 2023-06-06 NOTE — PROVIDER CONTACT NOTE (CHANGE IN STATUS NOTIFICATION) - BACKGROUND
64 y/o F with pmhx of [place] Palsy (left side), T2DM, CVA 2022 (left-sided deficits), CAD, HLD, HTN, Asthma, current smoker with a 52 pack year hx, ? AFib reportedly compliant on Eliquis who presented for evaluation of generalized weakness. Found with R pontine infarct on MRI
64 y/o F with pmhx of [place] Palsy (left side), T2DM, CVA 2022 (left-sided deficits), CAD, HLD, HTN, Asthma, current smoker with a 52 pack year hx, ? AFib reportedly compliant on Eliquis who presented for evaluation of generalized weakness. Found with R pontine infarct on MRI

## 2023-06-06 NOTE — PROVIDER CONTACT NOTE (CHANGE IN STATUS NOTIFICATION) - RECOMMENDATIONS
5mg labetalol and recheck in one hour
Recheck pressure in 15 minutes. If above parameter, will treat.

## 2023-06-06 NOTE — PROGRESS NOTE ADULT - SUBJECTIVE AND OBJECTIVE BOX
Neurology Stroke Progress Note    INTERVAL HPI/OVERNIGHT EVENTS:  Patient seen and examined.      MEDICATIONS  (STANDING):  amLODIPine   Tablet 10 milliGRAM(s) Oral daily  apixaban 5 milliGRAM(s) Oral every 12 hours  atorvastatin 80 milliGRAM(s) Oral at bedtime  carvedilol 3.125 milliGRAM(s) Oral every 12 hours  dextrose 5%. 1000 milliLiter(s) (100 mL/Hr) IV Continuous <Continuous>  dextrose 5%. 1000 milliLiter(s) (50 mL/Hr) IV Continuous <Continuous>  dextrose 50% Injectable 25 Gram(s) IV Push once  dextrose 50% Injectable 25 Gram(s) IV Push once  dextrose 50% Injectable 12.5 Gram(s) IV Push once  glucagon  Injectable 1 milliGRAM(s) IntraMuscular once  insulin glargine Injectable (LANTUS) 7 Unit(s) SubCutaneous at bedtime  insulin lispro (ADMELOG) corrective regimen sliding scale   SubCutaneous Before meals and at bedtime  insulin lispro Injectable (ADMELOG) 5 Unit(s) SubCutaneous three times a day before meals  lisinopril 40 milliGRAM(s) Oral daily    MEDICATIONS  (PRN):  acetaminophen     Tablet .. 650 milliGRAM(s) Oral every 6 hours PRN Temp greater or equal to 38C (100.4F), Moderate Pain (4 - 6)  dextrose Oral Gel 15 Gram(s) Oral once PRN Blood Glucose LESS THAN 70 milliGRAM(s)/deciliter      Allergies    codeine (Unknown)    Intolerances        Vital Signs Last 24 Hrs  T(C): 36.7 (06 Jun 2023 04:51), Max: 37.1 (05 Jun 2023 13:54)  T(F): 98 (06 Jun 2023 04:51), Max: 98.7 (05 Jun 2023 13:54)  HR: 64 (06 Jun 2023 05:48) (63 - 76)  BP: 146/64 (06 Jun 2023 05:48) (146/64 - 188/78)  BP(mean): 92 (06 Jun 2023 05:48) (92 - 112)  RR: 16 (06 Jun 2023 05:48) (16 - 20)  SpO2: 97% (06 Jun 2023 05:48) (95% - 97%)    Parameters below as of 06 Jun 2023 05:48  Patient On (Oxygen Delivery Method): room air      Physical exam:  General: awake and alert  Eyes: Anicteric sclerae, moist conjunctivae, see below for CNs  Extremities: RLE swelling > LLE    Neurologic:  -Mental status: Awake, alert, oriented to person and place, able to tell month with choices. Unable to tell the year. Speech is fluent with intact naming, repetition, and comprehension, mildly dysarthric. Follows simple and complex commands. Attention/concentration intact.    -Cranial nerves:   II: Visual fields are full to confrontation.  III, IV, VI: Extraocular movements are intact without nystagmus. Pupils equally round and reactive to light  V: Decreased sensation along L V1-V3 (residual from her prior hx of Bell's palsy). Sensory deficits split down the middle of her face.  VII: R NLFF. Decreased forehead wrinkling on the L. Decreased strength with L eye when provider attempted to open (residual from pts hx of Bell's)  XII: Tongue protrudes midline  Motor: Normal bulk and tone. B/l UE 4/5 without drift. B/l LEs 3-/5 with drift, do not hit the bed.  Sensation: Intact to light touch bilaterally. No neglect or extinction on double simultaneous testing.  Coordination: No obvious dysmetria with finger-to-nose  Gait: Deferred    LABS:                        11.6   6.96  )-----------( 216      ( 06 Jun 2023 05:30 )             34.3     06-06    139  |  108  |  37<H>  ----------------------------<  104<H>  4.1   |  22  |  1.77<H>    Ca    8.4      06 Jun 2023 05:30  Phos  4.8     06-06  Mg     2.1     06-06            RADIOLOGY & ADDITIONAL TESTS:  reviewed   Neurology Stroke Progress Note    INTERVAL HPI/OVERNIGHT EVENTS:  Patient seen and examined.      MEDICATIONS  (STANDING):  amLODIPine   Tablet 10 milliGRAM(s) Oral daily  apixaban 5 milliGRAM(s) Oral every 12 hours  atorvastatin 80 milliGRAM(s) Oral at bedtime  carvedilol 3.125 milliGRAM(s) Oral every 12 hours  dextrose 5%. 1000 milliLiter(s) (100 mL/Hr) IV Continuous <Continuous>  dextrose 5%. 1000 milliLiter(s) (50 mL/Hr) IV Continuous <Continuous>  dextrose 50% Injectable 25 Gram(s) IV Push once  dextrose 50% Injectable 25 Gram(s) IV Push once  dextrose 50% Injectable 12.5 Gram(s) IV Push once  glucagon  Injectable 1 milliGRAM(s) IntraMuscular once  insulin glargine Injectable (LANTUS) 7 Unit(s) SubCutaneous at bedtime  insulin lispro (ADMELOG) corrective regimen sliding scale   SubCutaneous Before meals and at bedtime  insulin lispro Injectable (ADMELOG) 5 Unit(s) SubCutaneous three times a day before meals  lisinopril 40 milliGRAM(s) Oral daily    MEDICATIONS  (PRN):  acetaminophen     Tablet .. 650 milliGRAM(s) Oral every 6 hours PRN Temp greater or equal to 38C (100.4F), Moderate Pain (4 - 6)  dextrose Oral Gel 15 Gram(s) Oral once PRN Blood Glucose LESS THAN 70 milliGRAM(s)/deciliter      Allergies    codeine (Unknown)    Intolerances        Vital Signs Last 24 Hrs  T(C): 36.7 (06 Jun 2023 04:51), Max: 37.1 (05 Jun 2023 13:54)  T(F): 98 (06 Jun 2023 04:51), Max: 98.7 (05 Jun 2023 13:54)  HR: 64 (06 Jun 2023 05:48) (63 - 76)  BP: 146/64 (06 Jun 2023 05:48) (146/64 - 188/78)  BP(mean): 92 (06 Jun 2023 05:48) (92 - 112)  RR: 16 (06 Jun 2023 05:48) (16 - 20)  SpO2: 97% (06 Jun 2023 05:48) (95% - 97%)    Parameters below as of 06 Jun 2023 05:48  Patient On (Oxygen Delivery Method): room air      Physical exam:  General: awake and alert  Eyes: Anicteric sclerae, moist conjunctivae, see below for CNs  Extremities: RLE swelling > LLE    Neurologic:  -Mental status: Awake, alert, oriented to person and place, able to tell month with choices. Unable to tell the year. Speech is fluent with intact naming, repetition, and comprehension, mildly dysarthric. Follows simple and complex commands. Attention/concentration intact.    -Cranial nerves:   II: Visual fields are full to confrontation.  III, IV, VI: Extraocular movements are intact without nystagmus. Pupils equally round and reactive to light  V: Decreased sensation along L V1-V3 (residual from her prior hx of Bell's palsy). Sensory deficits split down the middle of her face.  VII: R NLFF. Decreased forehead wrinkling on the L. Decreased strength with L eye when provider attempted to open (residual from pts hx of Bell's)  XII: Tongue protrudes midline  Motor: Normal bulk and tone. B/l UE 4/5 without drift. B/l LEs 3-/5 with drift, do not hit the bed.  Sensation: Intact to light touch bilaterally. No neglect or extinction on double simultaneous testing.  Coordination: No obvious dysmetria with finger-to-nose  Gait: Deferred    LABS:                        11.6   6.96  )-----------( 216      ( 06 Jun 2023 05:30 )             34.3     06-06    139  |  108  |  37<H>  ----------------------------<  104<H>  4.1   |  22  |  1.77<H>    Ca    8.4      06 Jun 2023 05:30  Phos  4.8     06-06  Mg     2.1     06-06      RADIOLOGY & ADDITIONAL TESTS:  reviewed   Neurology Stroke Progress Note    INTERVAL HPI/OVERNIGHT EVENTS:  Patient seen and examined.      MEDICATIONS  (STANDING):  amLODIPine   Tablet 10 milliGRAM(s) Oral daily  apixaban 5 milliGRAM(s) Oral every 12 hours  atorvastatin 80 milliGRAM(s) Oral at bedtime  carvedilol 3.125 milliGRAM(s) Oral every 12 hours  dextrose 5%. 1000 milliLiter(s) (100 mL/Hr) IV Continuous <Continuous>  dextrose 5%. 1000 milliLiter(s) (50 mL/Hr) IV Continuous <Continuous>  dextrose 50% Injectable 25 Gram(s) IV Push once  dextrose 50% Injectable 25 Gram(s) IV Push once  dextrose 50% Injectable 12.5 Gram(s) IV Push once  glucagon  Injectable 1 milliGRAM(s) IntraMuscular once  insulin glargine Injectable (LANTUS) 7 Unit(s) SubCutaneous at bedtime  insulin lispro (ADMELOG) corrective regimen sliding scale   SubCutaneous Before meals and at bedtime  insulin lispro Injectable (ADMELOG) 5 Unit(s) SubCutaneous three times a day before meals  lisinopril 40 milliGRAM(s) Oral daily    MEDICATIONS  (PRN):  acetaminophen     Tablet .. 650 milliGRAM(s) Oral every 6 hours PRN Temp greater or equal to 38C (100.4F), Moderate Pain (4 - 6)  dextrose Oral Gel 15 Gram(s) Oral once PRN Blood Glucose LESS THAN 70 milliGRAM(s)/deciliter      Allergies    codeine (Unknown)    Intolerances        Vital Signs Last 24 Hrs  T(C): 36.7 (06 Jun 2023 04:51), Max: 37.1 (05 Jun 2023 13:54)  T(F): 98 (06 Jun 2023 04:51), Max: 98.7 (05 Jun 2023 13:54)  HR: 64 (06 Jun 2023 05:48) (63 - 76)  BP: 146/64 (06 Jun 2023 05:48) (146/64 - 188/78)  BP(mean): 92 (06 Jun 2023 05:48) (92 - 112)  RR: 16 (06 Jun 2023 05:48) (16 - 20)  SpO2: 97% (06 Jun 2023 05:48) (95% - 97%)    Parameters below as of 06 Jun 2023 05:48  Patient On (Oxygen Delivery Method): room air      Physical exam:  General: awake and alert  Eyes: Anicteric sclerae, moist conjunctivae, see below for CNs  Extremities: RLE swelling > LLE    Neurologic:  -Mental status: Awake, alert, oriented to person and place, able to tell month with choices. Unable to tell the year. Speech is fluent with intact naming, repetition, and comprehension, mildly dysarthric. Follows simple and complex commands. Attention/concentration intact.    -Cranial nerves:   II: Visual fields are full to confrontation.  III, IV, VI: Extraocular movements are intact without nystagmus. Pupils equally round and reactive to light  V: Decreased sensation along L V1-V3 (residual from her prior hx of Bell's palsy). Sensory deficits split down the middle of her face.  VII: R NLFF. Decreased forehead wrinkling on the L. Decreased strength with L eye when provider attempted to open (residual from pts hx of Bell's)  XII: Tongue protrudes midline  Motor: Normal bulk and tone. B/l UE 4/5 without drift. B/l LEs 3-/5 with drift, do not hit the bed.  Sensation: Intact to light touch bilaterally. No neglect or extinction on double simultaneous testing.  Coordination: No obvious dysmetria with finger-to-nose  Gait: Deferred    MOCA Score: 8/30    LABS:                        11.6   6.96  )-----------( 216      ( 06 Jun 2023 05:30 )             34.3     06-06    139  |  108  |  37<H>  ----------------------------<  104<H>  4.1   |  22  |  1.77<H>    Ca    8.4      06 Jun 2023 05:30  Phos  4.8     06-06  Mg     2.1     06-06      RADIOLOGY & ADDITIONAL TESTS:  reviewed   Neurology Stroke Progress Note    INTERVAL HPI/OVERNIGHT EVENTS:  Patient seen and examined.      MEDICATIONS  (STANDING):  amLODIPine   Tablet 10 milliGRAM(s) Oral daily  apixaban 5 milliGRAM(s) Oral every 12 hours  atorvastatin 80 milliGRAM(s) Oral at bedtime  carvedilol 3.125 milliGRAM(s) Oral every 12 hours  dextrose 5%. 1000 milliLiter(s) (100 mL/Hr) IV Continuous <Continuous>  dextrose 5%. 1000 milliLiter(s) (50 mL/Hr) IV Continuous <Continuous>  dextrose 50% Injectable 25 Gram(s) IV Push once  dextrose 50% Injectable 25 Gram(s) IV Push once  dextrose 50% Injectable 12.5 Gram(s) IV Push once  glucagon  Injectable 1 milliGRAM(s) IntraMuscular once  insulin glargine Injectable (LANTUS) 7 Unit(s) SubCutaneous at bedtime  insulin lispro (ADMELOG) corrective regimen sliding scale   SubCutaneous Before meals and at bedtime  insulin lispro Injectable (ADMELOG) 5 Unit(s) SubCutaneous three times a day before meals  lisinopril 40 milliGRAM(s) Oral daily    MEDICATIONS  (PRN):  acetaminophen     Tablet .. 650 milliGRAM(s) Oral every 6 hours PRN Temp greater or equal to 38C (100.4F), Moderate Pain (4 - 6)  dextrose Oral Gel 15 Gram(s) Oral once PRN Blood Glucose LESS THAN 70 milliGRAM(s)/deciliter      Allergies    codeine (Unknown)    Intolerances        Vital Signs Last 24 Hrs  T(C): 36.7 (06 Jun 2023 04:51), Max: 37.1 (05 Jun 2023 13:54)  T(F): 98 (06 Jun 2023 04:51), Max: 98.7 (05 Jun 2023 13:54)  HR: 64 (06 Jun 2023 05:48) (63 - 76)  BP: 146/64 (06 Jun 2023 05:48) (146/64 - 188/78)  BP(mean): 92 (06 Jun 2023 05:48) (92 - 112)  RR: 16 (06 Jun 2023 05:48) (16 - 20)  SpO2: 97% (06 Jun 2023 05:48) (95% - 97%)    Parameters below as of 06 Jun 2023 05:48  Patient On (Oxygen Delivery Method): room air      Physical exam:  General: awake and alert  Eyes: Anicteric sclerae, moist conjunctivae, see below for CNs  Extremities: RLE swelling > LLE    Neurologic:  -Mental status: Awake, alert, oriented to person and place, able to tell month with choices. Unable to tell the year. Speech is fluent with intact naming, repetition, and comprehension, mildly dysarthric. Follows simple and complex commands. Attention/concentration intact.    -Cranial nerves:   II: Visual fields are full to confrontation.  III, IV, VI: Extraocular movements are intact without nystagmus. Pupils equally round and reactive to light  V: Decreased sensation along L V1-V3 (residual from her prior hx of Bell's palsy). Sensory deficits split down the middle of her face.  VII: R NLFF. Decreased forehead wrinkling on the L. Decreased strength with L eye when provider attempted to open (residual from pts hx of Bell's)  XII: Tongue protrudes midline  Motor: Normal bulk and tone. B/l UE 4/5 without drift. B/l LEs 3-/5 with drift, do not hit the bed.  Sensation: Intact to light touch bilaterally. No neglect or extinction on double simultaneous testing.  Coordination: No obvious dysmetria with finger-to-nose  Gait: Deferred    MOCA Score: 8/30    LABS:                        11.6   6.96  )-----------( 216      ( 06 Jun 2023 05:30 )             34.3     06-06    139  |  108  |  37<H>  ----------------------------<  104<H>  4.1   |  22  |  1.77<H>    Ca    8.4      06 Jun 2023 05:30   Phos  4.8     06-06  Mg     2.1     06-06      RADIOLOGY & ADDITIONAL TESTS:  reviewed

## 2023-06-06 NOTE — PROGRESS NOTE ADULT - PROBLEM SELECTOR PLAN 6
Pt with BUN/Cr of 31/1.70 on admission. UA showing proteinuria and glucosuria.   -creatinine 1.77 today (same as yesterday)  - due to increasing BUN, will give some gentle IV hydration and repeat labs tomorrow  - she should establish care with a nephrologist on discharge  -collateral on baseline creatinine needed - likely has diabetes related kidney disease.  Was able to obtain some collateral information.  Most recent Cr was from May 2023 - 1.57.  No documented history of CKD, but anticipate that she does have CKD due to existing co-morbidities of hypertension and Dm2.    - check bladder scan - patient is voiding

## 2023-06-06 NOTE — PROVIDER CONTACT NOTE (CHANGE IN STATUS NOTIFICATION) - ASSESSMENT
Lying in bed, sleeping
asymptomatic
patient sitting in bed, in no acute distress
Denies pain, no changes in exam

## 2023-06-06 NOTE — SWALLOW VFSS/MBS ASSESSMENT ADULT - ORAL PHASE COMMENTS
Adequate oral acceptance and containment. Reduced lingual control with the head of the bolus spilling into the hypopharynx prior to swallow initiation. Repetitive/disorganized lingual movements across consistencies, confounded by swallow initiation deficits. Trace lingual residue post swallow across consistencies 2/2 reduced lingual/palatal contact. Adequate oral acceptance and containment. Reduced lingual control with the bolus spilling into the hypopharynx prior to swallow initiation. Repetitive/disorganized lingual movements across consistencies, confounded by swallow initiation deficits. Trace lingual residue post swallow across consistencies 2/2 reduced lingual/palatal contact.

## 2023-06-06 NOTE — PROVIDER CONTACT NOTE (CHANGE IN STATUS NOTIFICATION) - SITUATION
sBP above parameters, sBP 187, BP goal 100-180
Blood pressure above goal of 180
Blood pressure above parameter
Blood pressure borderline above parameter

## 2023-06-06 NOTE — SWALLOW VFSS/MBS ASSESSMENT ADULT - RECOMMENDED FEEDING/EATING TECHNIQUES
allow for swallow between intakes/oral hygiene/position upright (90 degrees)/provide rest periods between swallows/small sips/bites

## 2023-06-06 NOTE — PROGRESS NOTE ADULT - PROBLEM SELECTOR PLAN 7
F: Tolerating oral   GI: None  DVT: lovenox  Code: Full code  Dispo: [facility]    >35 minutes spent on this encounter, including face to face with patient, care coordination and documentation.

## 2023-06-06 NOTE — PROGRESS NOTE ADULT - SUBJECTIVE AND OBJECTIVE BOX
SUBJECTIVE / INTERVAL HPI: Patient was seen and examined this morning. NPO for IWONA this AM and had MBS this afternoon.  She is feeling better, but weakness is the same. Appetite is good.     CAPILLARY BLOOD GLUCOSE & INSULIN RECEIVED  162 mg/dL (06-05 @ 17:41) - Lispro 3+2. Ate chicken and yams  184 mg/dL (06-05 @ 21:41) - LAntus 7 + lispro 2  144 mg/dL (06-05 @ 23:27)  104 mg/dL (06-06 @ 05:30) - NPO  105 mg/dL (06-06 @ 06:30)  130 mg/dL (06-06 @ 10:51)  211 mg/dL (06-06 @ 16:10)    REVIEW OF SYSTEMS  Constitutional:  Negative fever, chills, Reports poor appetite.  Eyes:  Negative blurry vision or double vision.  Cardiovascular:  Negative for chest pain or palpitations.  Respiratory:  Negative for cough, wheezing, or shortness of breath.   Gastrointestinal:  Negative for nausea, vomiting, diarrhea, constipation, or abdominal pain.  Genitourinary:  Negative frequency, urgency or dysuria.  Neurologic:  No headache, confusion, dizziness, lightheadedness. +weakness     PHYSICAL EXAM  Vital Signs Last 24 Hrs  T(C): 36.8 (06 Jun 2023 18:04), Max: 36.8 (05 Jun 2023 21:46)  T(F): 98.2 (06 Jun 2023 18:04), Max: 98.3 (05 Jun 2023 21:46)  HR: 74 (06 Jun 2023 16:50) (62 - 74)  BP: 190/84 (06 Jun 2023 16:50) (123/56 - 190/84)  BP(mean): 121 (06 Jun 2023 16:50) (80 - 121)  RR: 18 (06 Jun 2023 16:50) (16 - 18)  SpO2: 95% (06 Jun 2023 16:50) (95% - 98%)    Parameters below as of 06 Jun 2023 16:50  Patient On (Oxygen Delivery Method): room air    Constitutional: Awake, alert, in no acute distress.   HEENT: Normocephalic, atraumatic, left sided drooping notes (bell's)  Neck: supple, no acanthosis, no thyromegaly or palpable thyroid nodules.  Respiratory: Lungs clear to ausculation bilaterally.   Cardiovascular: regular rhythm, normal S1 and S2, no audible murmurs.   GI: soft, non-tender, non-distended, bowel sounds present, no masses appreciated.  Extremities: trace peripheral edema, peripheral pulses present.   Skin: no rashes.   Psychiatric: AAO x 3. Normal affect/mood.        LABS  CBC - WBC/HGB/HTC/PLT: 6.96/11.6/34.3/216 (06-06-23)  BMP - Na/K/Cl/Bicarb/BUN/Cr/Gluc/AG/eGFR: 139/4.1/108/22/37/1.77/104/9/32 (06-06-23)  Ca - 8.4 (06-06-23)  Phos - 4.8 (06-06-23)  Mg - 2.1 (06-06-23)  LFT - Alb/Tprot/Tbili/Dbili/AlkPhos/ALT/AST: 3.2/6.2/0.3/--/98/11/11 (06-03-23)    Thyroid Stimulating Hormone, Serum: 1.670 (06-04-23)  Total T4/Free T4: --/1.150 (06-04-23)    Rads:  Thyroid US  FINDINGS:  Right Lobe: 5.1 cm x 2.2 cm x  2.7 cm. The gland is normal in   echogenicity. There are multiple spongiform benign-appearing thyroid   nodules with the largest measuring 1.3 cm in the lower pole. (TIRAD -1).    There is 2.2 x 1.9 x 1.6 cm heterogeneous predominantly isoechoic nodule   in the lower pole. There are no calcifications. (TIRAD -3).    Left Lobe: 4.7 cm x  2.1 cm x 2.3 cm. The gland is normal in   echogenicity. There are multiple benign-appearing spongiform like nodules   with a large measuring 1.5 x 1.4 x 0.7 cm in the midpole. (TIRAD -1).    Isthmus: 6 mm..    Cervical Lymph Nodes: No enlarged or abnormal morphology cervical nodes.    IMPRESSION:    Dominant right thyroid lower pole nodule. Consider follow-up ultrasound   in one year.    Additional multiple spongiform benign-appearing thyroid nodules.    TI-RAD 3: Mildly suspicious (FNA if > 2.5 cm, Follow if > 1.5 cm)    MEDICATIONS  MEDICATIONS  (STANDING):  amLODIPine   Tablet 10 milliGRAM(s) Oral daily  aspirin enteric coated 81 milliGRAM(s) Oral daily  atorvastatin 80 milliGRAM(s) Oral at bedtime  carvedilol 3.125 milliGRAM(s) Oral every 12 hours  clopidogrel Tablet 75 milliGRAM(s) Oral daily  dextrose 5%. 1000 milliLiter(s) (100 mL/Hr) IV Continuous <Continuous>  dextrose 5%. 1000 milliLiter(s) (50 mL/Hr) IV Continuous <Continuous>  dextrose 50% Injectable 25 Gram(s) IV Push once  dextrose 50% Injectable 25 Gram(s) IV Push once  dextrose 50% Injectable 12.5 Gram(s) IV Push once  enoxaparin Injectable 40 milliGRAM(s) SubCutaneous every 12 hours  glucagon  Injectable 1 milliGRAM(s) IntraMuscular once  insulin glargine Injectable (LANTUS) 5 Unit(s) SubCutaneous at bedtime  insulin lispro (ADMELOG) corrective regimen sliding scale   SubCutaneous Before meals and at bedtime  insulin lispro Injectable (ADMELOG) 7 Unit(s) SubCutaneous three times a day before meals  lisinopril 40 milliGRAM(s) Oral daily  polyethylene glycol 3350 17 Gram(s) Oral daily  senna 2 Tablet(s) Oral at bedtime  sodium chloride 0.9%. 1000 milliLiter(s) (80 mL/Hr) IV Continuous <Continuous>    MEDICATIONS  (PRN):  acetaminophen     Tablet .. 650 milliGRAM(s) Oral every 6 hours PRN Temp greater or equal to 38C (100.4F), Moderate Pain (4 - 6)  dextrose Oral Gel 15 Gram(s) Oral once PRN Blood Glucose LESS THAN 70 milliGRAM(s)/deciliter

## 2023-06-06 NOTE — PROGRESS NOTE ADULT - SUBJECTIVE AND OBJECTIVE BOX
Drowsy after IWONA, otherwise no acute issues.     Remaining ROS negative       PHYSICAL EXAM:    General: resting in stretcher after IWONA  HEENT: NC/AT; MMM  Cardiovascular: +S1/S2, RRR  Respiratory: CTA B/L; no W/R/R  Gastrointestinal: soft, NT/ND; +BSx4  Extremities: WWP; mild RLE >LLE swelling (dopplers negative for DVT)  Neurological: post-anesthesia, sleepy, but moves extremities and follows commands      VITAL SIGNS:  Vital Signs Last 24 Hrs  T(C): 36.7 (06 Jun 2023 04:51), Max: 36.8 (05 Jun 2023 21:46)  T(F): 98 (06 Jun 2023 04:51), Max: 98.3 (05 Jun 2023 21:46)  HR: 66 (06 Jun 2023 12:03) (62 - 69)  BP: 123/56 (06 Jun 2023 12:03) (123/56 - 188/78)  BP(mean): 80 (06 Jun 2023 12:03) (80 - 112)  RR: 17 (06 Jun 2023 08:25) (16 - 18)  SpO2: 95% (06 Jun 2023 12:03) (95% - 98%)    Parameters below as of 06 Jun 2023 08:25  Patient On (Oxygen Delivery Method): room air      MEDICATIONS:  MEDICATIONS  (STANDING):  amLODIPine   Tablet 10 milliGRAM(s) Oral daily  aspirin enteric coated 81 milliGRAM(s) Oral daily  atorvastatin 80 milliGRAM(s) Oral at bedtime  carvedilol 3.125 milliGRAM(s) Oral every 12 hours  clopidogrel Tablet 75 milliGRAM(s) Oral daily  dextrose 5%. 1000 milliLiter(s) (100 mL/Hr) IV Continuous <Continuous>  dextrose 5%. 1000 milliLiter(s) (50 mL/Hr) IV Continuous <Continuous>  dextrose 50% Injectable 25 Gram(s) IV Push once  dextrose 50% Injectable 25 Gram(s) IV Push once  dextrose 50% Injectable 12.5 Gram(s) IV Push once  enoxaparin Injectable 40 milliGRAM(s) SubCutaneous every 12 hours  glucagon  Injectable 1 milliGRAM(s) IntraMuscular once  insulin glargine Injectable (LANTUS) 7 Unit(s) SubCutaneous at bedtime  insulin lispro (ADMELOG) corrective regimen sliding scale   SubCutaneous Before meals and at bedtime  insulin lispro Injectable (ADMELOG) 5 Unit(s) SubCutaneous three times a day before meals  lisinopril 40 milliGRAM(s) Oral daily  polyethylene glycol 3350 17 Gram(s) Oral daily  senna 2 Tablet(s) Oral at bedtime  sodium chloride 0.9%. 1000 milliLiter(s) (80 mL/Hr) IV Continuous <Continuous>    MEDICATIONS  (PRN):  acetaminophen     Tablet .. 650 milliGRAM(s) Oral every 6 hours PRN Temp greater or equal to 38C (100.4F), Moderate Pain (4 - 6)  dextrose Oral Gel 15 Gram(s) Oral once PRN Blood Glucose LESS THAN 70 milliGRAM(s)/deciliter      ALLERGIES:  Allergies    codeine (Unknown)    Intolerances        LABS:                        11.6   6.96  )-----------( 216      ( 06 Jun 2023 05:30 )             34.3     06-06    139  |  108  |  37<H>  ----------------------------<  104<H>  4.1   |  22  |  1.77<H>    Ca    8.4      06 Jun 2023 05:30  Phos  4.8     06-06  Mg     2.1     06-06          CAPILLARY BLOOD GLUCOSE      POCT Blood Glucose.: 211 mg/dL (06 Jun 2023 16:10)      RADIOLOGY & ADDITIONAL TESTS: Reviewed.

## 2023-06-06 NOTE — PROGRESS NOTE ADULT - PROBLEM SELECTOR PLAN 1
Type 2 diabetes mellitus - uncontrolled with hyperglycemia  -likely medication non-compliance vs adjustment needed   Home regimen: 75/25 insulin 20 units QD, Metformin 1000mg BID and Rybelsus 3mg QD. She will likely not need insulin at discharge.  - Please decrease lantus to 5 units at bedtime.   - Continue lispro to 5 units before each meal.  - Continue lispro moderate dose sliding scale four times daily with meals and at bedtime.  - Patient's fingerstick glucose goal is 100-180 mg/dL.    - For discharge, patient can TBD. Freestyle Ted sent to [facility] to see if covered by pharmacy. If not, it will need to be worked on as an outpatient.  - Patient to follow up with physician's in [place], has new endocrine appointment.

## 2023-06-06 NOTE — PROGRESS NOTE ADULT - NS ATTEND AMEND GEN_ALL_CORE FT
Pt seen on rounds this afternoon.  Was OOB in a chair.  [place] that her weakness was about the same, though was fairly mild on static testing today.  Facial weakness is unchanged.  Glucoses have been considerably better on fairly small doses of insulin.  FBS was 105 today after only 7 units of Lantus last night (plus 2 units lispro correction dose) for 10 PM FS of 184  Was elevated after supper last night with total of 5 units lispro for the meal.  PO intake has been fairly good despite the modified texture diet  --Given the decrease in glucose overnight, will decrease the Lantus to 5 units  --Will increase the standing premeal lispro dose to 5 units  She does not likely need insulin at home--just reasonable dietary compliance plus Rybelsus, which seems to be producing some degree of appetite suppression.  Describes early satiety on the drug, and seems to be "listening" to those messages and not trying to finish full meals  Thyroid ultrasound showed numerous spongiform nodules but also a 2.2 cm isoechoic R lower pole nodule without other suspicious sonographic characteristics.  Will probably not try to biopsy this during her hospitalization but will likely do as outpatient.  At a minimum, if FNA not done, should have follow-up ultrasound in 6 months, not a  year

## 2023-06-06 NOTE — PROGRESS NOTE ADULT - ASSESSMENT
64 y/o F with pmhx of [place] Palsy (left side), T2DM, CVA 2022 (left-sided deficits), CAD, HLD, HTN, Asthma, current smoker with a 52 pack year hx, ?AFib reportedly compliant on Eliquis who presented for evaluation of generalized weakness. Weakness started 5/21, got progressively worse 2-3 days ago. Daughter also noticed patient had been falling with occasional episodes of urinary incontinence. CTH with small vessel disease, CT C-spine negative for acute fx. Admitted to medicine for further w/u. Gen neuro consulted, recommended MRI brain w/o. Found to have R pontine infarct. Patient transferred to stroke tele for further management.     Neuro  #CVA workup  - continue home Eliquis 5 mg po bid - collateral obtained from outpt cards office: patient had a cryptogenic right thalamic stroke in 9/2019 and underwent loop recorder implant on 1/31/20 for said stroke. LR was checked in 8/2020 with no evidence of AF. 3/2021 patient admitted to [facility] for acute left basal ganglia and bilateral frontal lobe stroke. Loop recorder again showed no evidence of afib. Because stroke was suspicious for cardioembolic phenomenon the patient was started on apixaban.   - dc'ed Plavix on 6/5  - continue atorvastatin 80mg daily  - q4hr stroke neuro checks and vitals  - MRI brain: small focus of restricted diffusion in the R petra, left pontomedullary junction and right frontal periventricular white matter  - Stroke Code HCT Results: small vessel disease  - MRA head without steno-occlusive disease, MRA neck without stenosis or occlusion  - B12: 447  - folate: 11.2  - kappa/lamda free light chain ratio: 1.66  - Stroke education    #urinary incontinence, ? hyperreflexia r/o cord compression   - CT C-spine: no fx   - MRI C/T/L spine without cord compression    Cards  #HTN  - permissive hypertension, Goal -180  - continue carvedilol 3.125mg q12h and Amlodipine 10mg. increased lisinopril from 20 to 40mg of 6/4  - obtain TTE with bubble  - NPO at [facility] for IWONA  - Stroke Code EKG Results: NSR with L axis deviation and occasional Q waves but no active ischemic changes    #HLD  - high dose statin as above in CVA  - LDL results: 162    Pulm  - call provider if SPO2 < 94%    #asthma  - albuterol PRN    GI  #Nutrition/Fluids/Electrolytes   - replete K<4 and Mg <2  - Diet: Soft and bite sized  - pending MBS  - IVF: none    Renal  #CKD   - Cr 1.7 on admission   - Will continue to trend  - attempted to gather collateral from patient's PCP regarding baseline SCr, unable to get in contact.     Infectious Disease  - Stroke Code CXR results: negative    Endocrine  #DM  - A1C results: 9.8  - ISS  - continue Lantus 7U, 5U premeal in addition to sliding scale  - f/u endo recs    - TSH results: 2.000  - thyroid ultrasound: Dominant right thyroid lower pole nodule. Consider follow-up ultrasound in one year. Additional multiple spongiform benign-appearing thyroid nodules.      DVT Prophylaxis  - continue Eliquis  - dopplers: neg    Dispo: Acute Rehab     Discussed daily hospital plans and goals with patient    Discussed with Neurology Attending, Dr. Kyle Martinez   62 y/o F with pmhx of [place] Palsy (left side), T2DM, CVA 2022 (left-sided deficits), CAD, HLD, HTN, Asthma, current smoker with a 52 pack year hx, ?AFib reportedly compliant on Eliquis who presented for evaluation of generalized weakness. Weakness started 5/21, got progressively worse 2-3 days ago. Daughter also noticed patient had been falling with occasional episodes of urinary incontinence. CTH with small vessel disease, CT C-spine negative for acute fx. Admitted to medicine for further w/u. Gen neuro consulted, recommended MRI brain w/o. Found to have R pontine infarct. Patient transferred to stroke tele for further management.     Neuro  #CVA workup  - d/c eliquis and started asa/plavix on 6/6. collateral obtained from outpt cards office: patient had a cryptogenic right thalamic stroke in 9/2019 and underwent loop recorder implant on 1/31/20 for said stroke. LR was checked in 8/2020 with no evidence of AF. 3/2021 patient admitted to [facility] for acute left basal ganglia and bilateral frontal lobe stroke. Loop recorder again showed no evidence of afib. Because stroke was suspicious for cardioembolic phenomenon the patient was started on apixaban. IWONA this admission w/o thrombus.  - continue atorvastatin 80mg daily  - q4hr stroke neuro checks and vitals  - MRI brain: small focus of restricted diffusion in the R petra, left pontomedullary junction and right frontal periventricular white matter  - Stroke Code HCT Results: small vessel disease  - MRA head without steno-occlusive disease, MRA neck without stenosis or occlusion  - B12: 447  - folate: 11.2  - kappa/lamda free light chain ratio: 1.66  - Stroke education    #urinary incontinence, ? hyperreflexia r/o cord compression   - CT C-spine: no fx   - MRI C/T/L spine without cord compression    Cards  #HTN  - permissive hypertension, Goal -180  - continue carvedilol 3.125mg q12h and Amlodipine 10mg. increased lisinopril from 20 to 40mg of 6/4  < from: Echocardiogram w/ Bubble and Doppler (06.06.23 @ 09:25) >     1. Mild symmetric left ventricular hypertrophy.   2. Low-normal left ventricular systolic function.   3. Normal right ventricular size and systolic function.   4. Normal atria.   5. Aortic sclerosis without significant stenosis.   6. No other significant valvular disease.   7. No evidence of pulmonary hypertension.   8. No pericardial effusion.   9. Injection of agitated saline via a peripheral vein reveals no   evidence of a right-to-left shunt.    < from: IWONA w/Doppler (06.06.23 @ 09:52) >   1. Normal left ventricular size and systolic function.   2. Normal right ventricular size and systolic function.   3. Mild tricuspid regurgitation.   4. No LA/RA/FRANCISCA/RAA thrombus seen.   5. There is an interatrial septal aneurysm. Minimal interatrial right to   left shunting noted predominantly with Valsalva suggestive of a tiny   patent foramen ovale.   6. There is severe non-mobile plaque seen in the visualized portion of   the descending aorta. There is severe non-mobile plaque seen in the   visualized portion of the aortic arch.   7. No pericardial effusion.    - Stroke Code EKG Results: NSR with L axis deviation and occasional Q waves but no active ischemic changes    #HLD  - high dose statin as above in CVA  - LDL results: 162    Pulm  - call provider if SPO2 < 94%    #asthma  - albuterol PRN    GI  #Nutrition/Fluids/Electrolytes   - replete K<4 and Mg <2  - Diet: Soft and bite sized  - pending MBS  - IVF: maintenance NS started on 6/6 for BUN increase    Renal  #CKD   - Cr 1.7 on admission   - Will continue to trend  - attempted to gather collateral from patient's PCP regarding baseline SCr, unable to get in contact.     Infectious Disease  - Stroke Code CXR results: negative    Endocrine  #DM  - A1C results: 9.8  - ISS  - continue Lantus 7U, 5U premeal in addition to sliding scale  - f/u endo recs    - TSH results: 2.000  - thyroid ultrasound: Dominant right thyroid lower pole nodule. Consider follow-up ultrasound in one year. Additional multiple spongiform benign-appearing thyroid nodules.      DVT Prophylaxis  - lovenox + SCDs  - dopplers: neg    Dispo: Acute Rehab     Discussed daily hospital plans and goals with patient    Discussed with Neurology Attending, Dr. Kyle Martinez   62 y/o F with pmhx of [place] Palsy (left side), T2DM, CVA 2022 (left-sided deficits), CAD, HLD, HTN, Asthma, current smoker with a 52 pack year hx, ?AFib reportedly compliant on Eliquis who presented for evaluation of generalized weakness. Weakness started 5/21, got progressively worse 2-3 days ago. Daughter also noticed patient had been falling with occasional episodes of urinary incontinence. CTH with small vessel disease, CT C-spine negative for acute fx. Admitted to medicine for further w/u. Gen neuro consulted, recommended MRI brain w/o. Found to have R pontine infarct. Patient transferred to stroke tele for further management.     Neuro  #CVA workup  - d/c eliquis and started asa/plavix on 6/6. collateral obtained from outpt cards office: patient had a cryptogenic right thalamic stroke in 9/2019 and underwent loop recorder implant on 1/31/20 for said stroke. LR was checked in 8/2020 with no evidence of AF. 3/2021 patient admitted to [facility] for acute left basal ganglia and bilateral frontal lobe stroke. Loop recorder again showed no evidence of afib. Because stroke was suspicious for cardioembolic phenomenon the patient was started on apixaban. IWONA this admission w/o thrombus.  - continue atorvastatin 80mg daily  - q4hr stroke neuro checks and vitals  - MRI brain: small focus of restricted diffusion in the R petra, left pontomedullary junction and right frontal periventricular white matter  - Stroke Code HCT Results: small vessel disease  - MRA head without steno-occlusive disease, MRA neck without stenosis or occlusion  - B12: 447  - folate: 11.2  - kappa/lamda free light chain ratio: 1.66  - Stroke education  - EP for ILR interrogation    #urinary incontinence, ? hyperreflexia r/o cord compression   - CT C-spine: no fx   - MRI C/T/L spine without cord compression    Cards  #HTN  - permissive hypertension, Goal -180  - continue carvedilol 3.125mg q12h and Amlodipine 10mg. increased lisinopril from 20 to 40mg of 6/4  < from: Echocardiogram w/ Bubble and Doppler (06.06.23 @ 09:25) >     1. Mild symmetric left ventricular hypertrophy.   2. Low-normal left ventricular systolic function.   3. Normal right ventricular size and systolic function.   4. Normal atria.   5. Aortic sclerosis without significant stenosis.   6. No other significant valvular disease.   7. No evidence of pulmonary hypertension.   8. No pericardial effusion.   9. Injection of agitated saline via a peripheral vein reveals no   evidence of a right-to-left shunt.    < from: IWONA w/Doppler (06.06.23 @ 09:52) >   1. Normal left ventricular size and systolic function.   2. Normal right ventricular size and systolic function.   3. Mild tricuspid regurgitation.   4. No LA/RA/FRANCISCA/RAA thrombus seen.   5. There is an interatrial septal aneurysm. Minimal interatrial right to   left shunting noted predominantly with Valsalva suggestive of a tiny   patent foramen ovale.   6. There is severe non-mobile plaque seen in the visualized portion of   the descending aorta. There is severe non-mobile plaque seen in the   visualized portion of the aortic arch.   7. No pericardial effusion.    - Stroke Code EKG Results: NSR with L axis deviation and occasional Q waves but no active ischemic changes    #HLD  - high dose statin as above in CVA  - LDL results: 162    Pulm  - call provider if SPO2 < 94%    #asthma  - albuterol PRN    GI  #Nutrition/Fluids/Electrolytes   - replete K<4 and Mg <2  - Diet: Soft and bite sized  - pending MBS  - IVF: maintenance NS started on 6/6 for BUN increase    Renal  #CKD   - Cr 1.7 on admission   - Will continue to trend  - attempted to gather collateral from patient's PCP regarding baseline SCr, unable to get in contact.     Infectious Disease  - Stroke Code CXR results: negative    Endocrine  #DM  - A1C results: 9.8  - ISS  - continue Lantus 7U, 5U premeal in addition to sliding scale  - f/u endo recs    - TSH results: 2.000  - thyroid ultrasound: Dominant right thyroid lower pole nodule. Consider follow-up ultrasound in one year. Additional multiple spongiform benign-appearing thyroid nodules.      DVT Prophylaxis  - lovenox + SCDs  - dopplers: neg    Dispo: Acute Rehab     Discussed daily hospital plans and goals with patient    Discussed with Neurology Attending, Dr. Kyle Martinez   62 y/o F with pmhx of [place] Palsy (left side), T2DM, CVA 2022 (left-sided deficits), CAD, HLD, HTN, Asthma, current smoker with a 52 pack year hx, ?AFib reportedly compliant on Eliquis who presented for evaluation of generalized weakness. Weakness started 5/21, got progressively worse 2-3 days ago. Daughter also noticed patient had been falling with occasional episodes of urinary incontinence. CTH with small vessel disease, CT C-spine negative for acute fx. Admitted to medicine for further w/u. Gen neuro consulted, recommended MRI brain w/o. Found to have R pontine infarct. Patient transferred to stroke tele for further management.     Neuro  #CVA workup  - d/c eliquis and started asa/plavix on 6/6. collateral obtained from outpt cards office: patient had a cryptogenic right thalamic stroke in 9/2019 and underwent loop recorder implant on 1/31/20 for said stroke. LR was checked in 8/2020 with no evidence of AF. 3/2021 patient admitted to [facility] for acute left basal ganglia and bilateral frontal lobe stroke. Loop recorder again showed no evidence of afib. Because stroke was suspicious for cardioembolic phenomenon the patient was started on apixaban. IWONA this admission w/o thrombus.  - continue atorvastatin 80mg daily  - q4hr stroke neuro checks and vitals  - MRI brain: small focus of restricted diffusion in the R petra, left pontomedullary junction and right frontal periventricular white matter  - Stroke Code HCT Results: small vessel disease  - MRA head without steno-occlusive disease, MRA neck without stenosis or occlusion  - B12: 447  - folate: 11.2  - kappa/lamda free light chain ratio: 1.66  - Stroke education  - EP for ILR interrogation    #urinary incontinence, ? hyperreflexia r/o cord compression   - CT C-spine: no fx   - MRI C/T/L spine without cord compression    Cards  #HTN  - goal sBP 120-180  - continue carvedilol 3.125mg q12h and Amlodipine 10mg. increased lisinopril from 20 to 40mg of 6/4  < from: Echocardiogram w/ Bubble and Doppler (06.06.23 @ 09:25) >     1. Mild symmetric left ventricular hypertrophy.   2. Low-normal left ventricular systolic function.   3. Normal right ventricular size and systolic function.   4. Normal atria.   5. Aortic sclerosis without significant stenosis.   6. No other significant valvular disease.   7. No evidence of pulmonary hypertension.   8. No pericardial effusion.   9. Injection of agitated saline via a peripheral vein reveals no   evidence of a right-to-left shunt.    < from: IWONA w/Doppler (06.06.23 @ 09:52) >   1. Normal left ventricular size and systolic function.   2. Normal right ventricular size and systolic function.   3. Mild tricuspid regurgitation.   4. No LA/RA/FRANCISCA/RAA thrombus seen.   5. There is an interatrial septal aneurysm. Minimal interatrial right to   left shunting noted predominantly with Valsalva suggestive of a tiny   patent foramen ovale.   6. There is severe non-mobile plaque seen in the visualized portion of   the descending aorta. There is severe non-mobile plaque seen in the   visualized portion of the aortic arch.   7. No pericardial effusion.    - Stroke Code EKG Results: NSR with L axis deviation and occasional Q waves but no active ischemic changes    #HLD  - high dose statin as above in CVA  - LDL results: 162    Pulm  - call provider if SPO2 < 94%    #asthma  - albuterol PRN    GI  #Nutrition/Fluids/Electrolytes   - replete K<4 and Mg <2  - Diet: Soft and bite sized  - pending MBS  - IVF: maintenance NS started on 6/6 for BUN increase    Renal  #CKD   - Cr 1.7 on admission   - Will continue to trend  - attempted to gather collateral from patient's PCP regarding baseline SCr, unable to get in contact.     Infectious Disease  - Stroke Code CXR results: negative    Endocrine  #DM  - A1C results: 9.8  - ISS  - continue Lantus 7U, 5U premeal in addition to sliding scale  - f/u endo recs    - TSH results: 2.000  - thyroid ultrasound: Dominant right thyroid lower pole nodule. Consider follow-up ultrasound in one year. Additional multiple spongiform benign-appearing thyroid nodules.      DVT Prophylaxis  - lovenox + SCDs  - dopplers: neg    Dispo: Acute Rehab     Discussed daily hospital plans and goals with patient    Discussed with Neurology Attending, Dr. Kyle Martinez   62 y/o F with pmhx of [place] Palsy (left side), T2DM, CVA 2022 (left-sided deficits), CAD, HLD, HTN, Asthma, current smoker with a 52 pack year hx, ?AFib reportedly compliant on Eliquis who presented for evaluation of generalized weakness. Weakness started 5/21, got progressively worse 2-3 days ago. Daughter also noticed patient had been falling with occasional episodes of urinary incontinence. CTH with small vessel disease, CT C-spine negative for acute fx. Admitted to medicine for further w/u. Gen neuro consulted, recommended MRI brain w/o. Found to have R pontine infarct. Patient transferred to stroke tele for further management.     Neuro  #CVA workup  - d/c eliquis and started asa/plavix on 6/6. collateral obtained from outpt cards office: patient had a cryptogenic right thalamic stroke in 9/2019 and underwent loop recorder implant on 1/31/20 for said stroke. LR was checked in 8/2020 with no evidence of AF. 3/2021 patient admitted to [facility] for acute left basal ganglia and bilateral frontal lobe stroke. Loop recorder again showed no evidence of afib. Because stroke was suspicious for cardioembolic phenomenon the patient was started on apixaban. IWONA this admission w/o thrombus.  - continue atorvastatin 80mg daily  - q4hr stroke neuro checks and vitals  - MRI brain: small focus of restricted diffusion in the R petra, left pontomedullary junction and right frontal periventricular white matter  - Stroke Code HCT Results: small vessel disease  - MRA head without steno-occlusive disease, MRA neck without stenosis or occlusion  - B12: 447  - folate: 11.2  - kappa/lamda free light chain ratio: 1.66  - Stroke education  - EP for ILR interrogation    #urinary incontinence, ? hyperreflexia r/o cord compression   - CT C-spine: no fx   - MRI C/T/L spine without cord compression    Cards  #HTN  - goal sBP 120-180  - continue carvedilol 3.125mg q12h and Amlodipine 10mg. increased lisinopril from 20 to 40mg of 6/4  < from: Echocardiogram w/ Bubble and Doppler (06.06.23 @ 09:25) >     1. Mild symmetric left ventricular hypertrophy.   2. Low-normal left ventricular systolic function.   3. Normal right ventricular size and systolic function.   4. Normal atria.   5. Aortic sclerosis without significant stenosis.   6. No other significant valvular disease.   7. No evidence of pulmonary hypertension.   8. No pericardial effusion.   9. Injection of agitated saline via a peripheral vein reveals no   evidence of a right-to-left shunt.    < from: IWONA w/Doppler (06.06.23 @ 09:52) >   1. Normal left ventricular size and systolic function.   2. Normal right ventricular size and systolic function.   3. Mild tricuspid regurgitation.   4. No LA/RA/FRANCISCA/RAA thrombus seen.   5. There is an interatrial septal aneurysm. Minimal interatrial right to   left shunting noted predominantly with Valsalva suggestive of a tiny   patent foramen ovale.   6. There is severe non-mobile plaque seen in the visualized portion of   the descending aorta. There is severe non-mobile plaque seen in the   visualized portion of the aortic arch.   7. No pericardial effusion.    - Stroke Code EKG Results: NSR with L axis deviation and occasional Q waves but no active ischemic changes    #HLD  - high dose statin as above in CVA  - LDL results: 162    Pulm  - call provider if SPO2 < 94%    #asthma  - albuterol PRN    GI  #Nutrition/Fluids/Electrolytes   - replete K<4 and Mg <2  - Diet: Soft and bite sized  - pending MBS  - IVF: maintenance NS started on 6/6 for BUN increase    Renal  #CKD   - Cr 1.7 on admission   - Will continue to trend  - collateral from PCP's office obtained:  5/2023 - Cr 1.57  9/2022 - Cr 1.38  6/2022 - Cr 1.34  - will continue fluids and continue to monitor Cr    Infectious Disease  - Stroke Code CXR results: negative    Endocrine  #DM  - A1C results: 9.8  - ISS  - continue Lantus 7U, 5U premeal in addition to sliding scale  - f/u endo recs    - TSH results: 2.000  - thyroid ultrasound: Dominant right thyroid lower pole nodule. Consider follow-up ultrasound in one year. Additional multiple spongiform benign-appearing thyroid nodules.      DVT Prophylaxis  - lovenox + SCDs  - dopplers: neg    Dispo: Acute Rehab     Discussed daily hospital plans and goals with patient    Discussed with Neurology Attending, Dr. Kyle Martinez   62 y/o F with pmhx of [place] Palsy (left side), T2DM, CVA 2022 (left-sided deficits), CAD, HLD, HTN, Asthma, current smoker with a 52 pack year hx, ?AFib reportedly compliant on Eliquis who presented for evaluation of generalized weakness. Weakness started 5/21, got progressively worse 2-3 days ago. Daughter also noticed patient had been falling with occasional episodes of urinary incontinence. CTH with small vessel disease, CT C-spine negative for acute fx. Admitted to medicine for further w/u. Gen neuro consulted, recommended MRI brain w/o. Found to have R pontine infarct. Patient transferred to stroke tele for further management.     Neuro  #CVA workup  - d/c eliquis and started asa/plavix on 6/6. collateral obtained from outpt cards office: patient had a cryptogenic right thalamic stroke in 9/2019 and underwent loop recorder implant on 1/31/20 for said stroke. LR was checked in 8/2020 with no evidence of AF. 3/2021 patient admitted to [facility] for acute left basal ganglia and bilateral frontal lobe stroke. Loop recorder again showed no evidence of afib. Because stroke was suspicious for cardioembolic phenomenon the patient was started on apixaban. IWONA this admission w/o thrombus.  - continue atorvastatin 80mg daily  - q4hr stroke neuro checks and vitals  - MRI brain: small focus of restricted diffusion in the R petra, left pontomedullary junction and right frontal periventricular white matter  - Stroke Code HCT Results: small vessel disease  - MRA head without steno-occlusive disease, MRA neck without stenosis or occlusion  - B12: 447  - folate: 11.2  - kappa/lamda free light chain ratio: 1.66  - Stroke education  - EP for ILR interrogation    #urinary incontinence, ? hyperreflexia r/o cord compression   - CT C-spine: no fx   - MRI C/T/L spine without cord compression    Cards  #HTN  - goal sBP 120-180  - continue carvedilol 3.125mg q12h and Amlodipine 10mg. increased lisinopril from 20 to 40mg of 6/4  < from: Echocardiogram w/ Bubble and Doppler (06.06.23 @ 09:25) >     1. Mild symmetric left ventricular hypertrophy.   2. Low-normal left ventricular systolic function.   3. Normal right ventricular size and systolic function.   4. Normal atria.   5. Aortic sclerosis without significant stenosis.   6. No other significant valvular disease.   7. No evidence of pulmonary hypertension.   8. No pericardial effusion.   9. Injection of agitated saline via a peripheral vein reveals no   evidence of a right-to-left shunt.    < from: IWONA w/Doppler (06.06.23 @ 09:52) >   1. Normal left ventricular size and systolic function.   2. Normal right ventricular size and systolic function.   3. Mild tricuspid regurgitation.   4. No LA/RA/FRANCISCA/RAA thrombus seen.   5. There is an interatrial septal aneurysm. Minimal interatrial right to   left shunting noted predominantly with Valsalva suggestive of a tiny   patent foramen ovale.   6. There is severe non-mobile plaque seen in the visualized portion of   the descending aorta. There is severe non-mobile plaque seen in the   visualized portion of the aortic arch.   7. No pericardial effusion.    - Stroke Code EKG Results: NSR with L axis deviation and occasional Q waves but no active ischemic changes    #HLD  - high dose statin as above in CVA  - LDL results: 162    Pulm  - call provider if SPO2 < 94%    #asthma  - albuterol PRN    GI  #Nutrition/Fluids/Electrolytes   - replete K<4 and Mg <2  - Diet: Soft and bite sized  - MBS passed: soft and bite sized ordered  - IVF: maintenance NS started on 6/6 for BUN increase    Renal  #CKD   - Cr 1.7 on admission   - Will continue to trend  - collateral from PCP's office obtained:  5/2023 - Cr 1.57  9/2022 - Cr 1.38  6/2022 - Cr 1.34  - will continue fluids and continue to monitor Cr    Infectious Disease  - Stroke Code CXR results: negative    Endocrine  #DM  - A1C results: 9.8  - ISS  - continue Lantus 7U, 5U premeal in addition to sliding scale  - f/u endo recs    - TSH results: 2.000  - thyroid ultrasound: Dominant right thyroid lower pole nodule. Consider follow-up ultrasound in one year. Additional multiple spongiform benign-appearing thyroid nodules.      DVT Prophylaxis  - lovenox + SCDs  - dopplers: neg    Dispo: Acute Rehab     Discussed daily hospital plans and goals with patient    Discussed with Neurology Attending, Dr. Kyle Martinez   64 y/o F with pmhx of [place] Palsy (left side), T2DM, CVA 2022 (left-sided deficits), CAD, HLD, HTN, Asthma, current smoker with a 52 pack year hx, ?AFib reportedly compliant on Eliquis who presented for evaluation of generalized weakness. Weakness started 5/21, got progressively worse 2-3 days ago. Daughter also noticed patient had been falling with occasional episodes of urinary incontinence. CTH with small vessel disease, CT C-spine negative for acute fx. Admitted to medicine for further w/u. Gen neuro consulted, recommended MRI brain w/o. Found to have R pontine infarct. Patient transferred to stroke tele for further management.     Neuro  #CVA workup  - d/c eliquis and started asa/plavix on 6/6. collateral obtained from outpt cards office: patient had a cryptogenic right thalamic stroke in 9/2019 and underwent loop recorder implant on 1/31/20 for said stroke. LR was checked in 8/2020 with no evidence of AF. 3/2021 patient admitted to [facility] for acute left basal ganglia and bilateral frontal lobe stroke. Loop recorder again showed no evidence of afib. Because stroke was suspicious for cardioembolic phenomenon the patient was started on apixaban. IWONA this admission w/o thrombus.  - continue atorvastatin 80mg daily  - q4hr stroke neuro checks and vitals  - MRI brain: small focus of restricted diffusion in the R petra, left pontomedullary junction and right frontal periventricular white matter  - Stroke Code HCT Results: small vessel disease  - MRA head without steno-occlusive disease, MRA neck without stenosis or occlusion  - B12: 447  - folate: 11.2  - kappa/lamda free light chain ratio: 1.66  - Stroke education  - EP for ILR interrogation    #urinary incontinence, ? hyperreflexia r/o cord compression   - CT C-spine: no fx   - MRI C/T/L spine without cord compression    Cards  #HTN  - goal sBP 120-180  - continue carvedilol 3.125mg q12h and Amlodipine 10mg. increased lisinopril from 20 to 40mg of 6/4  < from: Echocardiogram w/ Bubble and Doppler (06.06.23 @ 09:25) >     1. Mild symmetric left ventricular hypertrophy.   2. Low-normal left ventricular systolic function.   3. Normal right ventricular size and systolic function.   4. Normal atria.   5. Aortic sclerosis without significant stenosis.   6. No other significant valvular disease.   7. No evidence of pulmonary hypertension.   8. No pericardial effusion.   9. Injection of agitated saline via a peripheral vein reveals no   evidence of a right-to-left shunt.    < from: IWONA w/Doppler (06.06.23 @ 09:52) >   1. Normal left ventricular size and systolic function.   2. Normal right ventricular size and systolic function.   3. Mild tricuspid regurgitation.   4. No LA/RA/FRANCISCA/RAA thrombus seen.   5. There is an interatrial septal aneurysm. Minimal interatrial right to   left shunting noted predominantly with Valsalva suggestive of a tiny   patent foramen ovale.   6. There is severe non-mobile plaque seen in the visualized portion of   the descending aorta. There is severe non-mobile plaque seen in the   visualized portion of the aortic arch.   7. No pericardial effusion.    - Stroke Code EKG Results: NSR with L axis deviation and occasional Q waves but no active ischemic changes    #HLD  - high dose statin as above in CVA  - LDL results: 162    Pulm  - call provider if SPO2 < 94%    #asthma  - albuterol PRN    GI  #Nutrition/Fluids/Electrolytes   - replete K<4 and Mg <2  - Diet: Soft and bite sized  - MBS passed: soft and bite sized ordered  - IVF: maintenance NS started on 6/6 for BUN increase    Renal  #CKD   - Cr 1.7 on admission   - Will continue to trend  - collateral from PCP's office obtained:  5/2023 - Cr 1.57   9/2022 - Cr 1.38  6/2022 - Cr 1.34  - will continue fluids and continue to monitor Cr    Infectious Disease  - Stroke Code CXR results: negative    Endocrine  #DM  - A1C results: 9.8  - ISS  - continue Lantus 7U, 5U premeal in addition to sliding scale  - f/u endo recs    - TSH results: 2.000  - thyroid ultrasound: Dominant right thyroid lower pole nodule. Consider follow-up ultrasound in one year. Additional multiple spongiform benign-appearing thyroid nodules.      DVT Prophylaxis  - lovenox + SCDs  - dopplers: neg    Dispo: Acute Rehab     Discussed daily hospital plans and goals with patient    Discussed with Neurology Attending, Dr. Kyle Martinez

## 2023-06-06 NOTE — PROGRESS NOTE ADULT - ASSESSMENT
62 y/o F with pmhx of [place] Palsy (left side), T2DM, CVA 2022 (left-sided deficits), CAD, HLD, HTN, Asthma, current smoker with a 52 pack year hx, ? AFib reportedly compliant on Eliquis who presented for evaluation of generalized weakness. Found to have R pontine infarct. Endocrinology consulted for diabetes management.    A1C: 9.8 %  BUN: 24  Creatinine: 1.46  eGFR: 40Weight (kg): 96.6  BMI (kg/m2): 41.6

## 2023-06-06 NOTE — PROGRESS NOTE ADULT - PROBLEM SELECTOR PLAN 2
There is 2.2 x 1.9 x 1.6 cm heterogeneous predominantly isoechoic nodule   in the lower pole of right lobe.  Additional multiple spongiform benign-appearing thyroid nodules in left lobe  Repeat US in 6 months to determine if FNA is needed due to size >2cm  TFTs normal.

## 2023-06-06 NOTE — PROGRESS NOTE ADULT - ASSESSMENT
63F with PMH of [place] Palsy, DM2, CVA (left side weak), CAD, HLD, HTN, Asthma noted to have acute R pontine stroke.  On stroke service with medical comanagment

## 2023-06-06 NOTE — PROGRESS NOTE ADULT - PROBLEM SELECTOR PLAN 3
takes Lisinopril 40mg qd, Amlodipine 10mg qd, and Metoprolol 50mg qd  - blood pressure continues to be elevated, after resumption of lisinopril  - creatinine increased, but not >0.3, so therefore not JUAN JOSÉ - will need to obtain collateral information about renal function  - changed metoprolol to carvedilol for better management of hypertension  - improvement in blood pressure today

## 2023-06-06 NOTE — PROGRESS NOTE ADULT - PROBLEM SELECTOR PLAN 1
MRI with short stroke protocol revealed acute ischemia in the right petra.  - eliquis discontinued - no DVT on dopplers and no cardiac thrombus  - continue on dual antiplatelet therapy

## 2023-06-06 NOTE — SWALLOW VFSS/MBS ASSESSMENT ADULT - PHARYNGEAL PHASE COMMENTS
Severely delayed swallow initiation with both thin and mildly thick liquids, with the head of the bolus pooling in the pyriforms prior to swallow initiation. On one occasion, the bolus spilled into the laryngeal vestibule prior to the swallow, which the resulted in trace aspiration. (+) cough response was effective in clearing aspirated material. Similar deficit with puree and soft solids, however the head of the bolus pooled in the valleculae prior to swallow initiation. Mildly reduced laryngeal elevation and laryngeal vestibule resulted in trace, shallow penetration with thin liquids. Adequate pharyngeal stripping wave and tongue base retraction resulted in effective pharyngeal clearance post swallow. Notable delay in swallow initiation across all consistencies, which swallow triggering with the bolus head in the pyriforms with liquids, and in the valleculae with solids. On one occasion, the bolus spilled into the laryngeal vestibule prior to the swallow, which the resulted in jose aspiration. (+) cough response was effective in clearing aspirated material. Mildly reduced laryngeal elevation and laryngeal vestibule resulted in trace, shallow penetration with thin liquids. Adequate pharyngeal stripping wave and tongue base retraction resulted in effective pharyngeal clearance post swallow. Notable delay in swallow initiation across all consistencies, with swallow triggering with the bolus head in the pyriforms with liquids and in the valleculae with solids. On one occasion, penetration occurred prior to the swallow as a result of delayed swallow initiation, with subsequent jose aspiration during the swallow. (+) cough response was effective in clearing aspirated material. Complete epiglottic inversion visualized. Mildly reduced laryngeal elevation and laryngeal vestibule closure resulted in penetration, mostly transient, with thin and mildly thick liquids. Adequate pharyngeal stripping wave and tongue base retraction resulted in effective pharyngeal clearance post swallow.

## 2023-06-07 LAB
ANION GAP SERPL CALC-SCNC: 8 MMOL/L — SIGNIFICANT CHANGE UP (ref 5–17)
APPEARANCE UR: CLEAR — SIGNIFICANT CHANGE UP
BACTERIA # UR AUTO: PRESENT /HPF
BILIRUB UR-MCNC: NEGATIVE — SIGNIFICANT CHANGE UP
BUN SERPL-MCNC: 36 MG/DL — HIGH (ref 7–23)
CALCIUM SERPL-MCNC: 8.2 MG/DL — LOW (ref 8.4–10.5)
CHLORIDE SERPL-SCNC: 111 MMOL/L — HIGH (ref 96–108)
CO2 SERPL-SCNC: 19 MMOL/L — LOW (ref 22–31)
COLOR SPEC: YELLOW — SIGNIFICANT CHANGE UP
COMMENT - URINE: SIGNIFICANT CHANGE UP
CREAT ?TM UR-MCNC: 53 MG/DL — SIGNIFICANT CHANGE UP
CREAT SERPL-MCNC: 1.74 MG/DL — HIGH (ref 0.5–1.3)
DIFF PNL FLD: NEGATIVE — SIGNIFICANT CHANGE UP
EGFR: 32 ML/MIN/1.73M2 — LOW
EPI CELLS # UR: ABNORMAL /HPF (ref 0–5)
GLUCOSE BLDC GLUCOMTR-MCNC: 136 MG/DL — HIGH (ref 70–99)
GLUCOSE BLDC GLUCOMTR-MCNC: 151 MG/DL — HIGH (ref 70–99)
GLUCOSE BLDC GLUCOMTR-MCNC: 168 MG/DL — HIGH (ref 70–99)
GLUCOSE BLDC GLUCOMTR-MCNC: 270 MG/DL — HIGH (ref 70–99)
GLUCOSE SERPL-MCNC: 129 MG/DL — HIGH (ref 70–99)
GLUCOSE UR QL: 100
HCT VFR BLD CALC: 34.4 % — LOW (ref 34.5–45)
HGB BLD-MCNC: 11.2 G/DL — LOW (ref 11.5–15.5)
HOMOCYSTEINE LEVEL: 13.4 UMOL/L — HIGH
KETONES UR-MCNC: NEGATIVE — SIGNIFICANT CHANGE UP
LEUKOCYTE ESTERASE UR-ACNC: NEGATIVE — SIGNIFICANT CHANGE UP
MAGNESIUM SERPL-MCNC: 2 MG/DL — SIGNIFICANT CHANGE UP (ref 1.6–2.6)
MCHC RBC-ENTMCNC: 26.9 PG — LOW (ref 27–34)
MCHC RBC-ENTMCNC: 32.6 GM/DL — SIGNIFICANT CHANGE UP (ref 32–36)
MCV RBC AUTO: 82.7 FL — SIGNIFICANT CHANGE UP (ref 80–100)
METHYLMALONIC ACID LEVEL: 202 NMOL/L — SIGNIFICANT CHANGE UP (ref 87–318)
NITRITE UR-MCNC: NEGATIVE — SIGNIFICANT CHANGE UP
NRBC # BLD: 0 /100 WBCS — SIGNIFICANT CHANGE UP (ref 0–0)
OSMOLALITY UR: 383 MOSM/KG — SIGNIFICANT CHANGE UP (ref 300–900)
PH UR: 7 — SIGNIFICANT CHANGE UP (ref 5–8)
PHOSPHATE SERPL-MCNC: 3.9 MG/DL — SIGNIFICANT CHANGE UP (ref 2.5–4.5)
PLATELET # BLD AUTO: 208 K/UL — SIGNIFICANT CHANGE UP (ref 150–400)
POTASSIUM SERPL-MCNC: 4.5 MMOL/L — SIGNIFICANT CHANGE UP (ref 3.5–5.3)
POTASSIUM SERPL-SCNC: 4.5 MMOL/L — SIGNIFICANT CHANGE UP (ref 3.5–5.3)
POTASSIUM UR-SCNC: 42 MMOL/L — SIGNIFICANT CHANGE UP
PROT ?TM UR-MCNC: 174 MG/DL — HIGH (ref 0–12)
PROT UR-MCNC: 100 MG/DL
PROT/CREAT UR-RTO: 3.3 RATIO — HIGH (ref 0–0.2)
RBC # BLD: 4.16 M/UL — SIGNIFICANT CHANGE UP (ref 3.8–5.2)
RBC # FLD: 14.5 % — SIGNIFICANT CHANGE UP (ref 10.3–14.5)
RBC CASTS # UR COMP ASSIST: < 5 /HPF — SIGNIFICANT CHANGE UP
SODIUM SERPL-SCNC: 138 MMOL/L — SIGNIFICANT CHANGE UP (ref 135–145)
SODIUM UR-SCNC: 51 MMOL/L — SIGNIFICANT CHANGE UP
SP GR SPEC: 1.02 — SIGNIFICANT CHANGE UP (ref 1–1.03)
UROBILINOGEN FLD QL: 0.2 E.U./DL — SIGNIFICANT CHANGE UP
UUN UR-MCNC: 520 MG/DL — SIGNIFICANT CHANGE UP
WBC # BLD: 6.69 K/UL — SIGNIFICANT CHANGE UP (ref 3.8–10.5)
WBC # FLD AUTO: 6.69 K/UL — SIGNIFICANT CHANGE UP (ref 3.8–10.5)
WBC UR QL: ABNORMAL /HPF

## 2023-06-07 PROCEDURE — 99232 SBSQ HOSP IP/OBS MODERATE 35: CPT

## 2023-06-07 PROCEDURE — 99233 SBSQ HOSP IP/OBS HIGH 50: CPT

## 2023-06-07 RX ORDER — INSULIN LISPRO 100/ML
7 VIAL (ML) SUBCUTANEOUS
Refills: 0 | Status: DISCONTINUED | OUTPATIENT
Start: 2023-06-07 | End: 2023-06-10

## 2023-06-07 RX ADMIN — AMLODIPINE BESYLATE 10 MILLIGRAM(S): 2.5 TABLET ORAL at 05:51

## 2023-06-07 RX ADMIN — Medication 5 UNIT(S): at 16:32

## 2023-06-07 RX ADMIN — ATORVASTATIN CALCIUM 80 MILLIGRAM(S): 80 TABLET, FILM COATED ORAL at 22:08

## 2023-06-07 RX ADMIN — Medication 2: at 16:31

## 2023-06-07 RX ADMIN — CLOPIDOGREL BISULFATE 75 MILLIGRAM(S): 75 TABLET, FILM COATED ORAL at 11:45

## 2023-06-07 RX ADMIN — Medication 6: at 22:08

## 2023-06-07 RX ADMIN — Medication 2: at 11:46

## 2023-06-07 RX ADMIN — CARVEDILOL PHOSPHATE 3.12 MILLIGRAM(S): 80 CAPSULE, EXTENDED RELEASE ORAL at 17:39

## 2023-06-07 RX ADMIN — Medication 81 MILLIGRAM(S): at 11:45

## 2023-06-07 RX ADMIN — CARVEDILOL PHOSPHATE 3.12 MILLIGRAM(S): 80 CAPSULE, EXTENDED RELEASE ORAL at 05:51

## 2023-06-07 RX ADMIN — Medication 5 UNIT(S): at 11:45

## 2023-06-07 RX ADMIN — SENNA PLUS 2 TABLET(S): 8.6 TABLET ORAL at 22:08

## 2023-06-07 RX ADMIN — Medication 5 UNIT(S): at 06:31

## 2023-06-07 RX ADMIN — ENOXAPARIN SODIUM 40 MILLIGRAM(S): 100 INJECTION SUBCUTANEOUS at 17:39

## 2023-06-07 RX ADMIN — INSULIN GLARGINE 5 UNIT(S): 100 INJECTION, SOLUTION SUBCUTANEOUS at 22:08

## 2023-06-07 RX ADMIN — ENOXAPARIN SODIUM 40 MILLIGRAM(S): 100 INJECTION SUBCUTANEOUS at 05:52

## 2023-06-07 RX ADMIN — LISINOPRIL 40 MILLIGRAM(S): 2.5 TABLET ORAL at 05:52

## 2023-06-07 NOTE — PROGRESS NOTE ADULT - SUBJECTIVE AND OBJECTIVE BOX
Feeling okay this morning.  Still has numbness in her feet and hands, but this has been going on for some time.     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  Cardiovascular: +S1/S2, RRR, no mrg  Respiratory: CTA B/L; no W/R/R  Gastrointestinal: soft, NT/ND; +BSx4  Extremities: WWP; no edema  Vascular: 2+ radial, DP/PT pulses B/L  Neurological: AAOx3; no focal deficits  Psychiatric: pleasant mood and affect  Dermatologic: no appreciable wounds or damage to the skin    VITAL SIGNS:  Vital Signs Last 24 Hrs  T(C): 37.3 (07 Jun 2023 13:39), Max: 37.3 (07 Jun 2023 13:39)  T(F): 99.2 (07 Jun 2023 13:39), Max: 99.2 (07 Jun 2023 13:39)  HR: 68 (07 Jun 2023 12:20) (64 - 97)  BP: 171/80 (07 Jun 2023 12:20) (123/60 - 190/84)  BP(mean): 116 (07 Jun 2023 10:08) (86 - 121)  RR: 18 (07 Jun 2023 12:20) (17 - 18)  SpO2: 98% (07 Jun 2023 12:20) (94% - 98%)    Parameters below as of 07 Jun 2023 12:20  Patient On (Oxygen Delivery Method): room air          MEDICATIONS:  MEDICATIONS  (STANDING):  amLODIPine   Tablet 10 milliGRAM(s) Oral daily  aspirin enteric coated 81 milliGRAM(s) Oral daily  atorvastatin 80 milliGRAM(s) Oral at bedtime  carvedilol 3.125 milliGRAM(s) Oral every 12 hours  clopidogrel Tablet 75 milliGRAM(s) Oral daily  dextrose 5%. 1000 milliLiter(s) (100 mL/Hr) IV Continuous <Continuous>  dextrose 5%. 1000 milliLiter(s) (50 mL/Hr) IV Continuous <Continuous>  dextrose 50% Injectable 25 Gram(s) IV Push once  dextrose 50% Injectable 25 Gram(s) IV Push once  dextrose 50% Injectable 12.5 Gram(s) IV Push once  enoxaparin Injectable 40 milliGRAM(s) SubCutaneous every 12 hours  glucagon  Injectable 1 milliGRAM(s) IntraMuscular once  insulin glargine Injectable (LANTUS) 5 Unit(s) SubCutaneous at bedtime  insulin lispro (ADMELOG) corrective regimen sliding scale   SubCutaneous Before meals and at bedtime  insulin lispro Injectable (ADMELOG) 5 Unit(s) SubCutaneous three times a day before meals  lisinopril 40 milliGRAM(s) Oral daily  polyethylene glycol 3350 17 Gram(s) Oral daily  senna 2 Tablet(s) Oral at bedtime  sodium chloride 0.9%. 1000 milliLiter(s) (80 mL/Hr) IV Continuous <Continuous>    MEDICATIONS  (PRN):  acetaminophen     Tablet .. 650 milliGRAM(s) Oral every 6 hours PRN Temp greater or equal to 38C (100.4F), Moderate Pain (4 - 6)  dextrose Oral Gel 15 Gram(s) Oral once PRN Blood Glucose LESS THAN 70 milliGRAM(s)/deciliter      ALLERGIES:  Allergies    codeine (Unknown)    Intolerances        LABS:                        11.2   6.69  )-----------( 208      ( 07 Jun 2023 05:30 )             34.4     06-07    138  |  111<H>  |  36<H>  ----------------------------<  129<H>  4.5   |  19<L>  |  1.74<H>    Ca    8.2<L>      07 Jun 2023 05:30  Phos  3.9     06-07  Mg     2.0     06-07          CAPILLARY BLOOD GLUCOSE      POCT Blood Glucose.: 151 mg/dL (07 Jun 2023 11:22)      RADIOLOGY & ADDITIONAL TESTS: Reviewed.

## 2023-06-07 NOTE — PROGRESS NOTE ADULT - SUBJECTIVE AND OBJECTIVE BOX
Electrophysiology Device Interrogation       Indication: stroke    Device model: 	LINQ			                          Battery: good  Arrhythmias: 0    Assessment:  ILR with good battery live and with 0 arrhythmia alerts.

## 2023-06-07 NOTE — PROGRESS NOTE ADULT - PROBLEM SELECTOR PLAN 6
Pt with BUN/Cr of 31/1.70 on admission. UA showing proteinuria and glucosuria.   -renal ultrasound  - pending nephrology evaluation  - would stop NS as getting NAGMA

## 2023-06-07 NOTE — PROGRESS NOTE ADULT - SUBJECTIVE AND OBJECTIVE BOX
SUBJECTIVE / INTERVAL HPI: Patient was seen and examined this morning. PT OOB to chair. Salt free bag of chips in front of her. She did not realize they were a carb and converted to sugar. Her appetite is good. She still complains of leg weakness and cold sensation to hands.  Freestyle Ted sent to [facility] and covered. Delivered to bedside. Will show how to place when daughter arrives.    CAPILLARY BLOOD GLUCOSE & INSULIN RECEIVED  211 mg/dL (06-06 @ 16:10) - Lispro 5+4. Ate meat, mashed potato, fruit cup.  184 mg/dL (06-06 @ 21:35) - Lantus 5 + lispro 2  129 mg/dL (06-07 @ 05:30)  136 mg/dL (06-07 @ 06:18) - Lispro 5. Ate eggs and pudding.  151 mg/dL (06-07 @ 11:22) - Ate chicken, mashed potato, and potato chips.    REVIEW OF SYSTEMS  Constitutional:  Negative fever, chills or loss of appetite.  Eyes:  Negative blurry vision or double vision.  Cardiovascular:  Negative for chest pain or palpitations.  Respiratory:  Negative for cough, wheezing, or shortness of breath.    Gastrointestinal:  Negative for nausea, vomiting, diarrhea, constipation, or abdominal pain.  Genitourinary:  Negative frequency, urgency or dysuria.  Neurologic:  No headache, confusion, dizziness, lightheadedness. + weakness    PHYSICAL EXAM  Vital Signs Last 24 Hrs  T(C): 37.1 (07 Jun 2023 09:30), Max: 37.1 (07 Jun 2023 09:30)  T(F): 98.8 (07 Jun 2023 09:30), Max: 98.8 (07 Jun 2023 09:30)  HR: 68 (07 Jun 2023 12:20) (64 - 97)  BP: 171/80 (07 Jun 2023 12:20) (123/60 - 190/84)  BP(mean): 116 (07 Jun 2023 10:08) (86 - 121)  RR: 18 (07 Jun 2023 12:20) (17 - 18)  SpO2: 98% (07 Jun 2023 12:20) (94% - 98%)    Parameters below as of 07 Jun 2023 12:20  Patient On (Oxygen Delivery Method): room air    Constitutional: Awake, alert, in no acute distress.   HEENT: Normocephalic, atraumatic, left sided drooping notes (bell's)  Neck: supple, no acanthosis, no thyromegaly or palpable thyroid nodules.  Respiratory: Lungs clear to ausculation bilaterally.   Cardiovascular: regular rhythm, normal S1 and S2, no audible murmurs.   GI: soft, non-tender, non-distended, bowel sounds present, no masses appreciated.  Extremities: trace peripheral edema, peripheral pulses present.   Skin: no rashes.   Psychiatric: AAO x 3. Normal affect/mood.        LABS  CBC - WBC/HGB/HTC/PLT: 6.69/11.2/34.4/208 (06-07-23)  BMP - Na/K/Cl/Bicarb/BUN/Cr/Gluc/AG/eGFR: 138/4.5/111/19/36/1.74/129/8/32 (06-07-23)  Ca - 8.2 (06-07-23)  Phos - 3.9 (06-07-23)  Mg - 2.0 (06-07-23)  LFT - Alb/Tprot/Tbili/Dbili/AlkPhos/ALT/AST: 3.2/6.2/0.3/--/98/11/11 (06-03-23)    Thyroid Stimulating Hormone, Serum: 1.670 (06-04-23)  Total T4/Free T4: --/1.150 (06-04-23)    MEDICATIONS  MEDICATIONS  (STANDING):  amLODIPine   Tablet 10 milliGRAM(s) Oral daily  aspirin enteric coated 81 milliGRAM(s) Oral daily  atorvastatin 80 milliGRAM(s) Oral at bedtime  carvedilol 3.125 milliGRAM(s) Oral every 12 hours  clopidogrel Tablet 75 milliGRAM(s) Oral daily  dextrose 5%. 1000 milliLiter(s) (100 mL/Hr) IV Continuous <Continuous>  dextrose 5%. 1000 milliLiter(s) (50 mL/Hr) IV Continuous <Continuous>  dextrose 50% Injectable 25 Gram(s) IV Push once  dextrose 50% Injectable 25 Gram(s) IV Push once  dextrose 50% Injectable 12.5 Gram(s) IV Push once  enoxaparin Injectable 40 milliGRAM(s) SubCutaneous every 12 hours  glucagon  Injectable 1 milliGRAM(s) IntraMuscular once  insulin glargine Injectable (LANTUS) 5 Unit(s) SubCutaneous at bedtime  insulin lispro (ADMELOG) corrective regimen sliding scale   SubCutaneous Before meals and at bedtime  insulin lispro Injectable (ADMELOG) 5 Unit(s) SubCutaneous three times a day before meals  lisinopril 40 milliGRAM(s) Oral daily  polyethylene glycol 3350 17 Gram(s) Oral daily  senna 2 Tablet(s) Oral at bedtime  sodium chloride 0.9%. 1000 milliLiter(s) (80 mL/Hr) IV Continuous <Continuous>    MEDICATIONS  (PRN):  acetaminophen     Tablet .. 650 milliGRAM(s) Oral every 6 hours PRN Temp greater or equal to 38C (100.4F), Moderate Pain (4 - 6)  dextrose Oral Gel 15 Gram(s) Oral once PRN Blood Glucose LESS THAN 70 milliGRAM(s)/deciliter

## 2023-06-07 NOTE — PROGRESS NOTE ADULT - ASSESSMENT
62 y/o F with pmhx of [place] Palsy (left side), T2DM, CVA 2022 (left-sided deficits), CAD, HLD, HTN, Asthma, current smoker with a 52 pack year hx, ?AFib reportedly compliant on Eliquis who presented for evaluation of generalized weakness. Weakness started 5/21, got progressively worse 2-3 days ago. Daughter also noticed patient had been falling with occasional episodes of urinary incontinence. CTH with small vessel disease, CT C-spine negative for acute fx. Admitted to medicine for further w/u. Gen neuro consulted, recommended MRI brain w/o. Found to have multiple infarcts. Patient transferred to stroke tele for further management.     Neuro  #CVA workup  - d/c eliquis and started asa/plavix on 6/6. collateral obtained from outpt cards office: patient had a cryptogenic right thalamic stroke in 9/2019 and underwent loop recorder implant on 1/31/20 for said stroke. LR was checked in 8/2020 with no evidence of AF. 3/2021 patient admitted to [facility] for acute left basal ganglia and bilateral frontal lobe stroke. Loop recorder again showed no evidence of afib. Because stroke was suspicious for cardioembolic phenomenon the patient was started on apixaban. IWONA this admission w/o thrombus.  - continue atorvastatin 80mg daily  - q4hr stroke neuro checks and vitals  - MRI brain: small focus of restricted diffusion in the R petra, left pontomedullary junction and right frontal periventricular white matter  - Stroke Code HCT Results: small vessel disease  - MRA head without steno-occlusive disease, MRA neck without stenosis or occlusion  - B12: 447  - folate: 11.2  - kappa/lamda free light chain ratio: 1.66  - Stroke education  - EP c/s placed for ILR interrogation  - f/u hypercoags  - f/u PET body    #urinary incontinence, ? hyperreflexia r/o cord compression   - CT C-spine: no fx   - MRI C/T/L spine without cord compression    Cards  #HTN  - goal sBP 120-180  - continue carvedilol 3.125mg q12h and Amlodipine 10mg. increased lisinopril from 20 to 40mg of 6/4  < from: Echocardiogram w/ Bubble and Doppler (06.06.23 @ 09:25) >     1. Mild symmetric left ventricular hypertrophy.   2. Low-normal left ventricular systolic function.   3. Normal right ventricular size and systolic function.   4. Normal atria.   5. Aortic sclerosis without significant stenosis.   6. No other significant valvular disease.   7. No evidence of pulmonary hypertension.   8. No pericardial effusion.   9. Injection of agitated saline via a peripheral vein reveals no   evidence of a right-to-left shunt.    < from: IWONA w/Doppler (06.06.23 @ 09:52) >   1. Normal left ventricular size and systolic function.   2. Normal right ventricular size and systolic function.   3. Mild tricuspid regurgitation.   4. No LA/RA/FRANCISCA/RAA thrombus seen.   5. There is an interatrial septal aneurysm. Minimal interatrial right to   left shunting noted predominantly with Valsalva suggestive of a tiny   patent foramen ovale.   6. There is severe non-mobile plaque seen in the visualized portion of   the descending aorta. There is severe non-mobile plaque seen in the   visualized portion of the aortic arch.   7. No pericardial effusion.    - Stroke Code EKG Results: NSR with L axis deviation and occasional Q waves but no active ischemic changes    #HLD  - high dose statin as above in CVA  - LDL results: 162    Pulm  - call provider if SPO2 < 94%    #asthma  - albuterol PRN    GI  #Nutrition/Fluids/Electrolytes   - replete K<4 and Mg <2  - Diet: Soft and bite sized  - MBS passed: soft and bite sized CC diet     Renal  #CKD   - Cr 1.7 on admission   - Will continue to trend  - collateral from PCP's office obtained:  5/2023 - Cr 1.57   9/2022 - Cr 1.38  6/2022 - Cr 1.34  - nephro consult placed for increasing creatinine  - f/u renal US  - f/u urine studies    Infectious Disease  - Stroke Code CXR results: negative    Endocrine  #DM  - A1C results: 9.8  - ISS  - continue Lantus 5U, 5U premeal in addition to sliding scale  - f/u endo recs    - TSH results: 2.000  - thyroid ultrasound: Dominant right thyroid lower pole nodule. Consider follow-up ultrasound in one year. Additional multiple spongiform benign-appearing thyroid nodules.      DVT Prophylaxis  - lovenox + SCDs  - dopplers: neg    Dispo: Acute Rehab     Discussed daily hospital plans and goals with patient    Discussed with Neurology Attending, Dr. Dela Cruz   64 y/o F with pmhx of [place] Palsy (left side), T2DM, CVA 2022 (left-sided deficits), CAD, HLD, HTN, Asthma, current smoker with a 52 pack year hx, ?AFib reportedly compliant on Eliquis who presented for evaluation of generalized weakness. Weakness started 5/21, got progressively worse 2-3 days ago. Daughter also noticed patient had been falling with occasional episodes of urinary incontinence. CTH with small vessel disease, CT C-spine negative for acute fx. Admitted to medicine for further w/u. Gen neuro consulted, recommended MRI brain w/o. Found to have multiple infarcts. Patient transferred to stroke tele for further management.     Neuro  #CVA workup  - d/c eliquis and started asa/plavix on 6/6. collateral obtained from outpt cards office: patient had a cryptogenic right thalamic stroke in 9/2019 and underwent loop recorder implant on 1/31/20 for said stroke. LR was checked in 8/2020 with no evidence of AF. 3/2021 patient admitted to [facility] for acute left basal ganglia and bilateral frontal lobe stroke. Loop recorder again showed no evidence of afib. Because stroke was suspicious for cardioembolic phenomenon the patient was started on apixaban. IWONA this admission w/o thrombus.  - continue atorvastatin 80mg daily  - q4hr stroke neuro checks and vitals  - MRI brain: small focus of restricted diffusion in the R petra, left pontomedullary junction and right frontal periventricular white matter  - Stroke Code HCT Results: small vessel disease  - MRA head without steno-occlusive disease, MRA neck without stenosis or occlusion  - B12: 447  - folate: 11.2  - kappa/lamda free light chain ratio: 1.66  - Stroke education  - EP c/s placed for ILR interrogation  - f/u hypercoags  - f/u PET body    #urinary incontinence, ? hyperreflexia r/o cord compression   - CT C-spine: no fx   - MRI C/T/L spine without cord compression    Cards  #HTN  - goal sBP 120-180  - continue carvedilol 3.125mg q12h and Amlodipine 10mg. increased lisinopril from 20 to 40mg of 6/4  < from: Echocardiogram w/ Bubble and Doppler (06.06.23 @ 09:25) >     1. Mild symmetric left ventricular hypertrophy.   2. Low-normal left ventricular systolic function.   3. Normal right ventricular size and systolic function.   4. Normal atria.   5. Aortic sclerosis without significant stenosis.   6. No other significant valvular disease.   7. No evidence of pulmonary hypertension.   8. No pericardial effusion.   9. Injection of agitated saline via a peripheral vein reveals no   evidence of a right-to-left shunt.    < from: IWONA w/Doppler (06.06.23 @ 09:52) >   1. Normal left ventricular size and systolic function.   2. Normal right ventricular size and systolic function.   3. Mild tricuspid regurgitation.   4. No LA/RA/FRANCISCA/RAA thrombus seen.   5. There is an interatrial septal aneurysm. Minimal interatrial right to   left shunting noted predominantly with Valsalva suggestive of a tiny   patent foramen ovale.   6. There is severe non-mobile plaque seen in the visualized portion of   the descending aorta. There is severe non-mobile plaque seen in the   visualized portion of the aortic arch.   7. No pericardial effusion.    - Stroke Code EKG Results: NSR with L axis deviation and occasional Q waves but no active ischemic changes    #HLD  - high dose statin as above in CVA  - LDL results: 162    Pulm  - call provider if SPO2 < 94%    #asthma  - albuterol PRN    GI  #Nutrition/Fluids/Electrolytes   - replete K<4 and Mg <2  - Diet: Soft and bite sized  - MBS passed: soft and bite sized CC diet     Renal  #CKD   - Cr 1.7 on admission   - Will continue to trend  - collateral from PCP's office obtained:  5/2023 - Cr 1.57   9/2022 - Cr 1.38  6/2022 - Cr 1.34  - nephro consult placed for increasing creatinine  - f/u renal US  - f/u urine studies    Infectious Disease  - Stroke Code CXR results: negative    Endocrine  #DM  - A1C results: 9.8  - ISS  - continue Lantus 5U, 5U premeal in addition to sliding scale  - f/u endo recs    - TSH results: 2.000  - thyroid ultrasound: Dominant right thyroid lower pole nodule. Consider follow-up ultrasound in one year. Additional multiple spongiform benign-appearing thyroid nodules.      DVT Prophylaxis  - heparin + SCDs  - dopplers: neg    Dispo: Acute Rehab     Discussed daily hospital plans and goals with patient    Discussed with Neurology Attending, Dr. Dela Cruz

## 2023-06-07 NOTE — PROGRESS NOTE ADULT - SUBJECTIVE AND OBJECTIVE BOX
Physical Medicine and Rehabilitation Progress Note :       Patient is a 63y old  Female who presents with a chief complaint of CVA (03 Jun 2023 11:52)      HPI:  Ms. Hendricks is a 64 y/o F with a PMHx of [place] Palsy (on the left side), TIIDM, CVA (left side weak), CAD, HLD, HTN, Asthma, current smoker with a 52 pack year hx, brought for evaluation of worsening generalized weakness since 5/21. Pt states she has had a CVA in the past with residual L sided weakness in her leg and arm, however, pt began to notice continual weakness in both her upper and lower extremities. Pt states she was taken to an ER in [place] for pain in her L ankle and was d/c after no DVT was discovered. Since then, pt states she lost her balance upon getting out of bed 2x and presented to ER at [facility] 6/2 for further evaluation. Pt states she has had increasingly difficulty in using her wrists and ankles. Pt denies diarrheal illness previously, denies SOB and cough, palpitations and CP, headaches, fevers, chills, nausea, vomiting, diarrhea, constipation, dysuria, hematuria, hematochezia. Pt noted to have had some urinary hesitancy. Daughter Rosemarie at bedside [phone], states her mother has poor compliance with her medications, states she has mostly sedentary lifestyle.     ED Course:  ED Vitals: Initial: T 97.8, HR 70, /82, satting 99% on RA   Notable labs: WBC 7.98, Hgb/Hct 12.7/37.2, platelets 242; Na 137, K 4.7, Cl 105, CO2 24, BUN/Cr 31/1.70, Glucose 360, troponin 0.01   UA: Protein, small blood, RBC, bacteria, + Glucose   CXR: No evidence of acute cardiopulmonary disease  CTH: No acute intracranial hemorrhage, mass effect, or demarcated recent infarction, small vessel ischemic change throughout cerebral white matter and deep gray/white matter lacunae.  CT Cervical Spine: No fracture   EKG: NSR with L axis deviation and occasional Q waves but no active ischemic changes   Pt was admitted to [facility] for further workup of generalized weakness (02 Jun 2023 17:54)                            11.2   6.69  )-----------( 208      ( 07 Jun 2023 05:30 )             34.4       06-07    138  |  111<H>  |  36<H>  ----------------------------<  129<H>  4.5   |  19<L>  |  1.74<H>    Ca    8.2<L>      07 Jun 2023 05:30  Phos  3.9     06-07  Mg     2.0     06-07      Vital Signs Last 24 Hrs  T(C): 37.1 (07 Jun 2023 09:30), Max: 37.1 (07 Jun 2023 09:30)  T(F): 98.8 (07 Jun 2023 09:30), Max: 98.8 (07 Jun 2023 09:30)  HR: 68 (07 Jun 2023 12:20) (64 - 97)  BP: 171/80 (07 Jun 2023 12:20) (123/60 - 190/84)  BP(mean): 116 (07 Jun 2023 10:08) (86 - 121)  RR: 18 (07 Jun 2023 12:20) (17 - 18)  SpO2: 98% (07 Jun 2023 12:20) (94% - 98%)    Parameters below as of 07 Jun 2023 12:20  Patient On (Oxygen Delivery Method): room air        MEDICATIONS  (STANDING):  amLODIPine   Tablet 10 milliGRAM(s) Oral daily  aspirin enteric coated 81 milliGRAM(s) Oral daily  atorvastatin 80 milliGRAM(s) Oral at bedtime  carvedilol 3.125 milliGRAM(s) Oral every 12 hours  clopidogrel Tablet 75 milliGRAM(s) Oral daily  dextrose 5%. 1000 milliLiter(s) (50 mL/Hr) IV Continuous <Continuous>  dextrose 5%. 1000 milliLiter(s) (100 mL/Hr) IV Continuous <Continuous>  dextrose 50% Injectable 12.5 Gram(s) IV Push once  dextrose 50% Injectable 25 Gram(s) IV Push once  dextrose 50% Injectable 25 Gram(s) IV Push once  enoxaparin Injectable 40 milliGRAM(s) SubCutaneous every 12 hours  glucagon  Injectable 1 milliGRAM(s) IntraMuscular once  insulin glargine Injectable (LANTUS) 5 Unit(s) SubCutaneous at bedtime  insulin lispro (ADMELOG) corrective regimen sliding scale   SubCutaneous Before meals and at bedtime  insulin lispro Injectable (ADMELOG) 5 Unit(s) SubCutaneous three times a day before meals  lisinopril 40 milliGRAM(s) Oral daily  polyethylene glycol 3350 17 Gram(s) Oral daily  senna 2 Tablet(s) Oral at bedtime  sodium chloride 0.9%. 1000 milliLiter(s) (80 mL/Hr) IV Continuous <Continuous>    MEDICATIONS  (PRN):  acetaminophen     Tablet .. 650 milliGRAM(s) Oral every 6 hours PRN Temp greater or equal to 38C (100.4F), Moderate Pain (4 - 6)  dextrose Oral Gel 15 Gram(s) Oral once PRN Blood Glucose LESS THAN 70 milliGRAM(s)/deciliter       6/6/2023 Functional Status Assessment :       Pain Assessment/Number Scale (0-10) Adult  Presence of Pain: denies pain/discomfort (Rating = 0)  Pain Rating (0-10): Rest: 0 (no pain/absence of nonverbal indicators of pain)  Pain Rating (0-10): Activity: 0 (no pain/absence of nonverbal indicators of pain)    Safety      AM-PAC Functional Assessment: Basic Mobility  Type of Assessment: Daily assessment  Turning from your back to your side while in a flat bed without using bedrails?: 3 = A little assistance  Moving from lying on your back to sitting on the flat side of a flat bed without using bedrails?: 3 = A little assistance  Moving to and from a bed to a chair (including a wheelchair)?: 3 = A little assistance  Standing up from a chair using your arms (e.g. wheelchair or bedside chair)?: 3 = A little assistance  Walking in hospital room?: 3 = A little assistance  Climbing 3-5 steps with a railing?: 3-calculated by average   Score: 18   Row Comment: Ask the patient "How much help from another person do you currently need? (If the patient hasn't done an activity recently, how much help from another person do you think he/she needs if he/she tried?)    Cognitive/Neuro      Cognitive/Neuro/Behavioral  Cognitive/Neuro/Behavioral [WDL Definition: Alert; opens eyes spontaneously; arouses to voice or touch; oriented x 4; follows commands; speech spontaneous, logical; purposeful motor response; behavior appropriate to situation]: WDL    Language Assistance  Preferred Language to Address Healthcare Preferred Language to Address Healthcare: English    Therapeutic Interventions      Bed Mobility  Bed Mobility Training Rolling/Turning: contact guard;  1 person assist;  verbal cues  Bed Mobility Training Scooting: contact guard;  1 person assist;  verbal cues  Bed Mobility Training Sit-to-Supine: contact guard;  1 person assist;  verbal cues  Bed Mobility Training Limitations: decreased ability to use legs for bridging/pushing;  impaired ability to control trunk for mobility;  decreased ability to use arms for pushing/pulling;  decreased strength;  impaired balance;  impaired postural control;  impaired motor control    Sit-Stand Transfer Training  Transfer Training Sit-to-Stand Transfer: minimum assist (75% patient effort);  1 person assist;  verbal cues;  rolling walker  Transfer Training Stand-to-Sit Transfer: minimum assist (75% patient effort);  1 person assist;  verbal cues;  rolling walker  Sit-to-Stand Transfer Training Transfer Safety Analysis: decreased weight-shifting ability;  decreased strength;  impaired balance;  impaired postural control    Toilet Transfer Training  Transfer Training Toilet Transfer: contact guard;  1 person assist;  verbal cues;  grab bars;  rolling walker  Toilet Transfer Training Transfer Safety Analysis: decreased weight-shifting ability;  impaired balance;  decreased strength;  impaired motor control;  impaired postural control    Gait Training  Gait Training: minimum assist (75% patient effort);  1 person assist;  verbal cues;  50 feet;  X 3;  rolling walker  Gait Analysis: decreased radha;  decreased velocity of limb motion;  decreased hip/knee flexion;  decreased weight-shifting ability;  Poor safety awareness requiring verbal cues for obstacle avoidance and to keep BUE on RW, required VC's to increase step height to improve BLE  clearance, reports fatigue;  cognitive, decreased safety awareness;  impaired postural control;  impaired motor control;  decreased strength;  impaired balance    Therapeutic Exercise  Therapeutic Exercise Detail: Standing marches x 10, seated marches x 10, LAQ x 10, shoulder flexion/extension x 10Mild dysmetria on L heel to shin, RUE/RLE 5/5, L shoulder flexion 3/5, L elbow flexion/extension 3+/5, L  4/5, L hip flexion 4-/5, L knee flexion/extension 4/5, L ankle DF/PF 4/5             PM&R Impression : as above    Current Disposition Plan Recommendations :    acute rehab placement

## 2023-06-07 NOTE — PROGRESS NOTE ADULT - PROBLEM SELECTOR PLAN 1
Type 2 diabetes mellitus - uncontrolled with hyperglycemia  -likely medication non-compliance vs adjustment needed   Home regimen: 75/25 insulin 20 units QD, Metformin 1000mg BID and Rybelsus 3mg QD. She will likely not need insulin at discharge.  - Please decrease lantus to  units at bedtime.   - Continue lispro to  units before each meal.  - Continue lispro moderate dose sliding scale four times daily with meals and at bedtime.  - Patient's fingerstick glucose goal is 100-180 mg/dL.    - For discharge, patient can TBD. Freestyle Ted sent to [facility] and covered. Delivered to bedside. Will show how to place when daughter arrives.  - Patient to follow up with physician's in [place], has new endocrine appointment. Type 2 diabetes mellitus - uncontrolled with hyperglycemia  -likely medication non-compliance vs adjustment needed   Home regimen: 75/25 insulin 20 units QD, Metformin 1000mg BID and Rybelsus 3mg QD. She will likely not need insulin at discharge.  - Please continue lantus 5 units at bedtime.   - Increase lispro to 7 units before each meal.  - Continue lispro moderate dose sliding scale four times daily with meals and at bedtime.  - Patient's fingerstick glucose goal is 100-180 mg/dL.    - For discharge, patient can TBD. Freestyle Ted sent to [facility] and covered. Delivered to bedside. Will show how to place when daughter arrives.  - Patient to follow up with physician's in [place], has new endocrine appointment.

## 2023-06-07 NOTE — PROGRESS NOTE ADULT - PROBLEM SELECTOR PLAN 2
1 week history of urinary incontinence in addition to generalized weakness and dysarthria as noted above.  - please check bladder scan in the event that patient has issues with urinary retention and resultant overflow incontinence.  UA does not appear infected.   - outpatient follow up  - no pathology on MRI in lumbar spine to explain incontinence  - follow up bladder scan

## 2023-06-07 NOTE — PROGRESS NOTE ADULT - ASSESSMENT
62 y/o F with pmhx of [place] Palsy (left side), T2DM, CVA 2022 (left-sided deficits), CAD, HLD, HTN, Asthma, current smoker with a 52 pack year hx, ? AFib reportedly compliant on Eliquis who presented for evaluation of generalized weakness. Found to have R pontine infarct. Endocrinology consulted for diabetes management.    A1C: 9.8 %  BUN: 36  Creatinine: 1.74  GFR: 32  Weight: 96.6  BMI: 41.6

## 2023-06-07 NOTE — PROGRESS NOTE ADULT - ASSESSMENT
Neurology    63 y o F with pmhx of [place] Palsy (left side), T2DM, CVA 2022 (left-sided deficits), CAD, HLD, HTN, Asthma, current smoker with a 52 pack year hx, ?AFib reportedly compliant on Eliquis who presented for evaluation of generalized weakness. Weakness started 5/21, got progressively worse 2-3 days ago. Daughter also noticed patient had been falling with occasional episodes of urinary incontinence. CTH with small vessel disease, CT C-spine negative for acute fx. Admitted to medicine for further w/u. Gen neuro consulted, recommended MRI brain w/o. Found to have R pontine infarct. Patient transferred to stroke tele for further management.     Neuro  #CVA workup  - d/c eliquis and started asa/plavix on 6/6. collateral obtained from outpt cards office: patient had a cryptogenic right thalamic stroke in 9/2019 and underwent loop recorder implant on 1/31/20 for said stroke. LR was checked in 8/2020 with no evidence of AF. 3/2021 patient admitted to [facility] for acute left basal ganglia and bilateral frontal lobe stroke. Loop recorder again showed no evidence of afib. Because stroke was suspicious for cardioembolic phenomenon the patient was started on apixaban. IWONA this admission w/o thrombus.  - continue atorvastatin 80mg daily  - q4hr stroke neuro checks and vitals  - MRI brain: small focus of restricted diffusion in the R petra, left pontomedullary junction and right frontal periventricular white matter  - Stroke Code HCT Results: small vessel disease  - MRA head without steno-occlusive disease, MRA neck without stenosis or occlusion  - B12: 447  - folate: 11.2  - kappa/lamda free light chain ratio: 1.66  - Stroke education  - EP for ILR interrogation    #urinary incontinence, ? hyperreflexia r/o cord compression   - CT C-spine: no fx   - MRI C/T/L spine without cord compression    Cards  #HTN  - goal sBP 120-180  - continue carvedilol 3.125mg q12h and Amlodipine 10mg. increased lisinopril from 20 to 40mg of 6/4  < from: Echocardiogram w/ Bubble and Doppler (06.06.23 @ 09:25) >     1. Mild symmetric left ventricular hypertrophy.   2. Low-normal left ventricular systolic function.   3. Normal right ventricular size and systolic function.   4. Normal atria.   5. Aortic sclerosis without significant stenosis.   6. No other significant valvular disease.   7. No evidence of pulmonary hypertension.   8. No pericardial effusion.   9. Injection of agitated saline via a peripheral vein reveals no   evidence of a right-to-left shunt.    < from: IWONA w/Doppler (06.06.23 @ 09:52) >   1. Normal left ventricular size and systolic function.   2. Normal right ventricular size and systolic function.   3. Mild tricuspid regurgitation.   4. No LA/RA/FRANCISCA/RAA thrombus seen.   5. There is an interatrial septal aneurysm. Minimal interatrial right to   left shunting noted predominantly with Valsalva suggestive of a tiny   patent foramen ovale.   6. There is severe non-mobile plaque seen in the visualized portion of   the descending aorta. There is severe non-mobile plaque seen in the   visualized portion of the aortic arch.   7. No pericardial effusion.    - Stroke Code EKG Results: NSR with L axis deviation and occasional Q waves but no active ischemic changes    #HLD  - high dose statin as above in CVA  - LDL results: 162    Pulm  - call provider if SPO2 < 94%    #asthma  - albuterol PRN    GI  #Nutrition/Fluids/Electrolytes   - replete K<4 and Mg <2  - Diet: Soft and bite sized  - MBS passed: soft and bite sized ordered  - IVF: maintenance NS started on 6/6 for BUN increase    Renal  #CKD   - Cr 1.7 on admission   - Will continue to trend  - collateral from PCP's office obtained:  5/2023 - Cr 1.57   9/2022 - Cr 1.38  6/2022 - Cr 1.34  - will continue fluids and continue to monitor Cr    Infectious Disease  - Stroke Code CXR results: negative    Endocrine  #DM  - A1C results: 9.8  - ISS  - continue Lantus 7U, 5U premeal in addition to sliding scale  - f/u endo recs    - TSH results: 2.000  - thyroid ultrasound: Dominant right thyroid lower pole nodule. Consider follow-up ultrasound in one year. Additional multiple spongiform benign-appearing thyroid nodules.      DVT Prophylaxis  - lovenox + SCDs  - dopplers: neg    Dispo: Acute Rehab     Discussed daily hospital plans and goals with patient    Discussed with Neurology Attending, Dr. Kyle Martinez

## 2023-06-07 NOTE — PROGRESS NOTE ADULT - NS ATTEND AMEND GEN_ALL_CORE FT
The patient is a 63-year-old female with a history of prior strokes in 2019, 2020, 2021 (felt to be cardioembolic w/o confirmed a fib; on Eliquis, compliant), CAD, hypertension, hyperlipidemia, type 2 diabetes, tobacco dependence admitted after presenting with progressive generalized weakness with MRI showing an acute right pontine, left pontomedullary junction, and right frontal periventricular ischemic infarcts.  MRA head/neck unremarkable for large vessel disease.  TTE, IWONA unremarkable apart from small PFO (DVT US negative), severe nonmobile plaque of the aorta. ILR interrogation, hypercoag testing, and PET pending to better understand etiology of recurrent strokes. For now, continue DAPT (no clear indication for a/c), statin. SBP goal: 120-180- will gradually lower to normotension.    Will need outpatient f/u for thyroid nodules. Will consult neph re renal function/HTN.

## 2023-06-07 NOTE — PROGRESS NOTE ADULT - ASSESSMENT
63F with PMH of Lefor Palsy, DM2, CVA (left side weak), CAD, HLD, HTN, Asthma noted to have acute R pontine stroke.  On stroke service with medical comanagment

## 2023-06-07 NOTE — MEDICAL STUDENT PROGRESS NOTE(EDUCATION) - SUBJECTIVE AND OBJECTIVE BOX
Overnight Overnight  Patient reported episode of urinary incontinence overnight and wants to know if that is something she can address.    Neurologic:  -Mental status: Awake, alert, oriented to person and place, able to tell month with choices. Unable to tell the year. Speech is fluent with intact naming, repetition, and comprehension, mildly dysarthric. Follows simple and complex commands. Attention/concentration intact.    -Cranial nerves:   II: Visual fields are diminished on left temporal side.  III, IV, VI: Extraocular movements are intact without nystagmus. Pupils equally round and reactive to light  V: Decreased sensation along L V1-V3 (residual from her prior hx of Bell's palsy) on the left side. Sensory deficits split down the middle of her face. Patient reports 'tingling' on left side when touched.  VII: R NLFF. Decreased forehead wrinkling on the L. Decreased strength with L eye when provider attempted to open (residual from pts hx of Bell's).   XII: Tongue protrudes midline. Evidence of oral thrush.  Motor: Normal bulk and tone. B/l UE 4/5 without drift. B/l LEs 3-/5 with drift, do not hit the bed.  Sensation: Intact to light touch bilaterally in upper and lower extremities. No neglect or extinction on double simultaneous testing.  Coordination: No obvious dysmetria with finger-to-nose or heel-shin testing. No dysdyadokinesia.   Gait: Deferred     auditory hallucinations

## 2023-06-07 NOTE — PROGRESS NOTE ADULT - SUBJECTIVE AND OBJECTIVE BOX
Neurology Stroke Progress Note    INTERVAL HPI/OVERNIGHT EVENTS:  Patient seen and examined. Denies acute neurological complaints.    MEDICATIONS  (STANDING):  amLODIPine   Tablet 10 milliGRAM(s) Oral daily  aspirin enteric coated 81 milliGRAM(s) Oral daily  atorvastatin 80 milliGRAM(s) Oral at bedtime  carvedilol 3.125 milliGRAM(s) Oral every 12 hours  clopidogrel Tablet 75 milliGRAM(s) Oral daily  dextrose 5%. 1000 milliLiter(s) (50 mL/Hr) IV Continuous <Continuous>  dextrose 5%. 1000 milliLiter(s) (100 mL/Hr) IV Continuous <Continuous>  dextrose 50% Injectable 12.5 Gram(s) IV Push once  dextrose 50% Injectable 25 Gram(s) IV Push once  dextrose 50% Injectable 25 Gram(s) IV Push once  enoxaparin Injectable 40 milliGRAM(s) SubCutaneous every 12 hours  glucagon  Injectable 1 milliGRAM(s) IntraMuscular once  insulin glargine Injectable (LANTUS) 5 Unit(s) SubCutaneous at bedtime  insulin lispro (ADMELOG) corrective regimen sliding scale   SubCutaneous Before meals and at bedtime  insulin lispro Injectable (ADMELOG) 5 Unit(s) SubCutaneous three times a day before meals  lisinopril 40 milliGRAM(s) Oral daily  polyethylene glycol 3350 17 Gram(s) Oral daily  senna 2 Tablet(s) Oral at bedtime  sodium chloride 0.9%. 1000 milliLiter(s) (80 mL/Hr) IV Continuous <Continuous>    MEDICATIONS  (PRN):  acetaminophen     Tablet .. 650 milliGRAM(s) Oral every 6 hours PRN Temp greater or equal to 38C (100.4F), Moderate Pain (4 - 6)  dextrose Oral Gel 15 Gram(s) Oral once PRN Blood Glucose LESS THAN 70 milliGRAM(s)/deciliter      Allergies    codeine (Unknown)    Intolerances        Vital Signs Last 24 Hrs  T(C): 37.3 (07 Jun 2023 13:39), Max: 37.3 (07 Jun 2023 13:39)  T(F): 99.2 (07 Jun 2023 13:39), Max: 99.2 (07 Jun 2023 13:39)  HR: 68 (07 Jun 2023 12:20) (64 - 97)  BP: 171/80 (07 Jun 2023 12:20) (123/60 - 190/84)  BP(mean): 116 (07 Jun 2023 10:08) (86 - 121)  RR: 18 (07 Jun 2023 12:20) (17 - 18)  SpO2: 98% (07 Jun 2023 12:20) (94% - 98%)    Parameters below as of 07 Jun 2023 12:20  Patient On (Oxygen Delivery Method): room air    Physical exam:  General: awake and alert  Eyes: Anicteric sclerae, moist conjunctivae, see below for CNs  Extremities: RLE swelling > LLE    Neurologic:  -Mental status: Awake, alert, oriented to person and place, able to tell month with choices. Unable to tell the year. Speech is fluent with intact naming, repetition, and comprehension, mildly dysarthric. Follows simple and complex commands. Attention/concentration intact.    -Cranial nerves:   II: Visual fields are full to confrontation.  III, IV, VI: Extraocular movements are intact without nystagmus. Pupils equally round and reactive to light  V: Decreased sensation along L V1-V3 (residual from her prior hx of Bell's palsy). Sensory deficits split down the middle of her face.  VII: R NLFF. Decreased forehead wrinkling on the L. Decreased strength with L eye when provider attempted to open (residual from pts hx of Bell's)  XII: Tongue protrudes midline  Motor: Normal bulk and tone. B/l UE 4/5 without drift. B/l LEs 3-/5 with drift, do not hit the bed.  Sensation: Intact to light touch bilaterally. No neglect or extinction on double simultaneous testing.  Coordination: No obvious dysmetria with finger-to-nose  Gait: Deferred      LABS:                        11.2   6.69  )-----------( 208      ( 07 Jun 2023 05:30 )             34.4     06-07    138  |  111<H>  |  36<H>  ----------------------------<  129<H>  4.5   |  19<L>  |  1.74<H>    Ca    8.2<L>      07 Jun 2023 05:30  Phos  3.9     06-07  Mg     2.0     06-07            RADIOLOGY & ADDITIONAL TESTS:  reviewed   Neurology Stroke Progress Note    INTERVAL HPI/OVERNIGHT EVENTS:  Patient seen and examined. Denies acute neurological complaints, but reports overnight urinary incontinence episode.    MEDICATIONS  (STANDING):  amLODIPine   Tablet 10 milliGRAM(s) Oral daily  aspirin enteric coated 81 milliGRAM(s) Oral daily  atorvastatin 80 milliGRAM(s) Oral at bedtime  carvedilol 3.125 milliGRAM(s) Oral every 12 hours  clopidogrel Tablet 75 milliGRAM(s) Oral daily  dextrose 5%. 1000 milliLiter(s) (50 mL/Hr) IV Continuous <Continuous>  dextrose 5%. 1000 milliLiter(s) (100 mL/Hr) IV Continuous <Continuous>  dextrose 50% Injectable 12.5 Gram(s) IV Push once  dextrose 50% Injectable 25 Gram(s) IV Push once  dextrose 50% Injectable 25 Gram(s) IV Push once  enoxaparin Injectable 40 milliGRAM(s) SubCutaneous every 12 hours  glucagon  Injectable 1 milliGRAM(s) IntraMuscular once  insulin glargine Injectable (LANTUS) 5 Unit(s) SubCutaneous at bedtime  insulin lispro (ADMELOG) corrective regimen sliding scale   SubCutaneous Before meals and at bedtime  insulin lispro Injectable (ADMELOG) 5 Unit(s) SubCutaneous three times a day before meals  lisinopril 40 milliGRAM(s) Oral daily  polyethylene glycol 3350 17 Gram(s) Oral daily  senna 2 Tablet(s) Oral at bedtime  sodium chloride 0.9%. 1000 milliLiter(s) (80 mL/Hr) IV Continuous <Continuous>    MEDICATIONS  (PRN):  acetaminophen     Tablet .. 650 milliGRAM(s) Oral every 6 hours PRN Temp greater or equal to 38C (100.4F), Moderate Pain (4 - 6)  dextrose Oral Gel 15 Gram(s) Oral once PRN Blood Glucose LESS THAN 70 milliGRAM(s)/deciliter      Allergies    codeine (Unknown)    Intolerances        Vital Signs Last 24 Hrs  T(C): 37.3 (07 Jun 2023 13:39), Max: 37.3 (07 Jun 2023 13:39)  T(F): 99.2 (07 Jun 2023 13:39), Max: 99.2 (07 Jun 2023 13:39)  HR: 68 (07 Jun 2023 12:20) (64 - 97)  BP: 171/80 (07 Jun 2023 12:20) (123/60 - 190/84)  BP(mean): 116 (07 Jun 2023 10:08) (86 - 121)  RR: 18 (07 Jun 2023 12:20) (17 - 18)  SpO2: 98% (07 Jun 2023 12:20) (94% - 98%)    Parameters below as of 07 Jun 2023 12:20  Patient On (Oxygen Delivery Method): room air    Physical exam:  General: awake and alert  Eyes: Anicteric sclerae, moist conjunctivae, see below for CNs  Extremities: RLE swelling > LLE    Neurologic:  -Mental status: Awake, alert, oriented to person and place, able to tell month, but not the year or day of week. Speech is fluent with intact naming, repetition, and comprehension, mildly dysarthric. Follows simple commands. Easily distractible.  -Cranial nerves:   II: Visual fields are diminished on left temporal side.  III, IV, VI: Extraocular movements are intact without nystagmus. Pupils equally round and reactive to light  V: Decreased sensation along L V1-V3 (residual from her prior hx of Bell's palsy) on the left side. Sensory deficits split down the middle of her face. Patient reports 'tingling' on left side when touched.  VII: R NLFF. Decreased forehead wrinkling on the L and left-sided droop when smiling.  XII: Tongue protrudes midline. Evidence of oral thrush.  Motor: Normal bulk and tone. B/l UE 4/5 without drift. B/l LEs 3-/5 with drift, do not hit the bed.  Sensation: Intact to light touch bilaterally in upper and lower extremities, with some evidence of stocking-glove neuropathy. No neglect or extinction on double simultaneous testing.  Coordination: No obvious dysmetria with finger-to-nose or heel-shin testing. No dysdyadokinesia.   Gait: Deferred  Reflexes: +2 bilateral upper and lower      LABS:                        11.2   6.69  )-----------( 208      ( 07 Jun 2023 05:30 )             34.4     06-07    138  |  111<H>  |  36<H>  ----------------------------<  129<H>  4.5   |  19<L>  |  1.74<H>    Ca    8.2<L>      07 Jun 2023 05:30  Phos  3.9     06-07  Mg     2.0     06-07            RADIOLOGY & ADDITIONAL TESTS:  reviewed

## 2023-06-08 LAB
ANION GAP SERPL CALC-SCNC: 8 MMOL/L — SIGNIFICANT CHANGE UP (ref 5–17)
BUN SERPL-MCNC: 30 MG/DL — HIGH (ref 7–23)
CALCIUM SERPL-MCNC: 8.5 MG/DL — SIGNIFICANT CHANGE UP (ref 8.4–10.5)
CHLORIDE SERPL-SCNC: 108 MMOL/L — SIGNIFICANT CHANGE UP (ref 96–108)
CO2 SERPL-SCNC: 21 MMOL/L — LOW (ref 22–31)
CREAT SERPL-MCNC: 1.56 MG/DL — HIGH (ref 0.5–1.3)
EGFR: 37 ML/MIN/1.73M2 — LOW
GLUCOSE BLDC GLUCOMTR-MCNC: 106 MG/DL — HIGH (ref 70–99)
GLUCOSE BLDC GLUCOMTR-MCNC: 117 MG/DL — HIGH (ref 70–99)
GLUCOSE BLDC GLUCOMTR-MCNC: 126 MG/DL — HIGH (ref 70–99)
GLUCOSE BLDC GLUCOMTR-MCNC: 139 MG/DL — HIGH (ref 70–99)
GLUCOSE BLDC GLUCOMTR-MCNC: 202 MG/DL — HIGH (ref 70–99)
GLUCOSE BLDC GLUCOMTR-MCNC: <10 MG/DL — CRITICAL LOW (ref 70–99)
GLUCOSE SERPL-MCNC: 144 MG/DL — HIGH (ref 70–99)
HCT VFR BLD CALC: 35.1 % — SIGNIFICANT CHANGE UP (ref 34.5–45)
HCYS SERPL-MCNC: 13.1 UMOL/L — SIGNIFICANT CHANGE UP
HGB BLD-MCNC: 11.5 G/DL — SIGNIFICANT CHANGE UP (ref 11.5–15.5)
MAGNESIUM SERPL-MCNC: 1.9 MG/DL — SIGNIFICANT CHANGE UP (ref 1.6–2.6)
MCHC RBC-ENTMCNC: 26.6 PG — LOW (ref 27–34)
MCHC RBC-ENTMCNC: 32.8 GM/DL — SIGNIFICANT CHANGE UP (ref 32–36)
MCV RBC AUTO: 81.3 FL — SIGNIFICANT CHANGE UP (ref 80–100)
NRBC # BLD: 0 /100 WBCS — SIGNIFICANT CHANGE UP (ref 0–0)
PHOSPHATE SERPL-MCNC: 3.5 MG/DL — SIGNIFICANT CHANGE UP (ref 2.5–4.5)
PLATELET # BLD AUTO: 214 K/UL — SIGNIFICANT CHANGE UP (ref 150–400)
POTASSIUM SERPL-MCNC: 4.6 MMOL/L — SIGNIFICANT CHANGE UP (ref 3.5–5.3)
POTASSIUM SERPL-SCNC: 4.6 MMOL/L — SIGNIFICANT CHANGE UP (ref 3.5–5.3)
RBC # BLD: 4.32 M/UL — SIGNIFICANT CHANGE UP (ref 3.8–5.2)
RBC # FLD: 14.4 % — SIGNIFICANT CHANGE UP (ref 10.3–14.5)
SODIUM SERPL-SCNC: 137 MMOL/L — SIGNIFICANT CHANGE UP (ref 135–145)
WBC # BLD: 7.29 K/UL — SIGNIFICANT CHANGE UP (ref 3.8–10.5)
WBC # FLD AUTO: 7.29 K/UL — SIGNIFICANT CHANGE UP (ref 3.8–10.5)

## 2023-06-08 PROCEDURE — 99231 SBSQ HOSP IP/OBS SF/LOW 25: CPT

## 2023-06-08 PROCEDURE — 99223 1ST HOSP IP/OBS HIGH 75: CPT

## 2023-06-08 PROCEDURE — 76775 US EXAM ABDO BACK WALL LIM: CPT | Mod: 26

## 2023-06-08 PROCEDURE — 78815 PET IMAGE W/CT SKULL-THIGH: CPT | Mod: 26,PI

## 2023-06-08 PROCEDURE — 99232 SBSQ HOSP IP/OBS MODERATE 35: CPT

## 2023-06-08 RX ORDER — LIDOCAINE 4 G/100G
1 CREAM TOPICAL ONCE
Refills: 0 | Status: COMPLETED | OUTPATIENT
Start: 2023-06-08 | End: 2023-06-08

## 2023-06-08 RX ORDER — LABETALOL HCL 100 MG
10 TABLET ORAL ONCE
Refills: 0 | Status: COMPLETED | OUTPATIENT
Start: 2023-06-08 | End: 2023-06-08

## 2023-06-08 RX ORDER — HEPARIN SODIUM 5000 [USP'U]/ML
7500 INJECTION INTRAVENOUS; SUBCUTANEOUS EVERY 8 HOURS
Refills: 0 | Status: DISCONTINUED | OUTPATIENT
Start: 2023-06-08 | End: 2023-06-13

## 2023-06-08 RX ORDER — CARVEDILOL PHOSPHATE 80 MG/1
6.25 CAPSULE, EXTENDED RELEASE ORAL EVERY 12 HOURS
Refills: 0 | Status: DISCONTINUED | OUTPATIENT
Start: 2023-06-09 | End: 2023-06-10

## 2023-06-08 RX ORDER — HEPARIN SODIUM 5000 [USP'U]/ML
5000 INJECTION INTRAVENOUS; SUBCUTANEOUS EVERY 8 HOURS
Refills: 0 | Status: DISCONTINUED | OUTPATIENT
Start: 2023-06-08 | End: 2023-06-08

## 2023-06-08 RX ADMIN — CLOPIDOGREL BISULFATE 75 MILLIGRAM(S): 75 TABLET, FILM COATED ORAL at 11:16

## 2023-06-08 RX ADMIN — Medication 650 MILLIGRAM(S): at 21:35

## 2023-06-08 RX ADMIN — CARVEDILOL PHOSPHATE 3.12 MILLIGRAM(S): 80 CAPSULE, EXTENDED RELEASE ORAL at 05:30

## 2023-06-08 RX ADMIN — HEPARIN SODIUM 7500 UNIT(S): 5000 INJECTION INTRAVENOUS; SUBCUTANEOUS at 05:29

## 2023-06-08 RX ADMIN — POLYETHYLENE GLYCOL 3350 17 GRAM(S): 17 POWDER, FOR SOLUTION ORAL at 11:16

## 2023-06-08 RX ADMIN — ATORVASTATIN CALCIUM 80 MILLIGRAM(S): 80 TABLET, FILM COATED ORAL at 21:23

## 2023-06-08 RX ADMIN — Medication 10 MILLIGRAM(S): at 20:36

## 2023-06-08 RX ADMIN — Medication 7 UNIT(S): at 16:32

## 2023-06-08 RX ADMIN — HEPARIN SODIUM 7500 UNIT(S): 5000 INJECTION INTRAVENOUS; SUBCUTANEOUS at 21:24

## 2023-06-08 RX ADMIN — Medication 4: at 21:24

## 2023-06-08 RX ADMIN — Medication 81 MILLIGRAM(S): at 11:16

## 2023-06-08 RX ADMIN — Medication 7 UNIT(S): at 07:07

## 2023-06-08 RX ADMIN — LISINOPRIL 40 MILLIGRAM(S): 2.5 TABLET ORAL at 05:30

## 2023-06-08 RX ADMIN — CARVEDILOL PHOSPHATE 3.12 MILLIGRAM(S): 80 CAPSULE, EXTENDED RELEASE ORAL at 17:20

## 2023-06-08 RX ADMIN — Medication 650 MILLIGRAM(S): at 20:36

## 2023-06-08 RX ADMIN — INSULIN GLARGINE 5 UNIT(S): 100 INJECTION, SOLUTION SUBCUTANEOUS at 21:23

## 2023-06-08 RX ADMIN — HEPARIN SODIUM 7500 UNIT(S): 5000 INJECTION INTRAVENOUS; SUBCUTANEOUS at 16:56

## 2023-06-08 RX ADMIN — AMLODIPINE BESYLATE 10 MILLIGRAM(S): 2.5 TABLET ORAL at 05:30

## 2023-06-08 RX ADMIN — LIDOCAINE 1 PATCH: 4 CREAM TOPICAL at 20:36

## 2023-06-08 NOTE — PROGRESS NOTE ADULT - SUBJECTIVE AND OBJECTIVE BOX
***Note in progress***    OVERNIGHT EVENTS: NAEO    SUBJECTIVE / INTERVAL HPI: Patient seen and examined at bedside. Patient denying chest pain, SOB, palpitations, cough. Patient denies fever, chills, HA, Dizziness, change in vision/hearing, N/V, abdominal pain, diarrhea, constipation, hematochezia/melena, dysuria, hematuria, new onset weakness/numbness, LE pain and/or swelling.    Remaining ROS negative       PHYSICAL EXAM:    General: WDWN  HEENT: NC/AT; PERRL, anicteric sclera; MMM  Neck: supple  Cardiovascular: +S1/S2, RRR  Respiratory: CTA B/L; no W/R/R  Gastrointestinal: soft, NT/ND; +BSx4  Extremities: WWP; no edema, clubbing or cyanosis  Vascular: 2+ radial, DP/PT pulses B/L  Neurological: AAOx3; no focal deficits  Psychiatric: pleasant mood and affect  Dermatologic: no appreciable wounds or damage to the skin    VITAL SIGNS:  Vital Signs Last 24 Hrs  T(C): 37.1 (2023 09:06), Max: 37.3 (2023 13:39)  T(F): 98.7 (2023 09:06), Max: 99.2 (2023 13:39)  HR: 71 (2023 08:35) (63 - 80)  BP: 175/76 (2023 08:35) (136/61 - 182/78)  BP(mean): 109 (2023 08:35) (88 - 116)  RR: 18 (2023 08:35) (18 - 18)  SpO2: 96% (2023 08:35) (96% - 98%)    Parameters below as of 2023 08:35  Patient On (Oxygen Delivery Method): room air          MEDICATIONS:  MEDICATIONS  (STANDING):  amLODIPine   Tablet 10 milliGRAM(s) Oral daily  aspirin enteric coated 81 milliGRAM(s) Oral daily  atorvastatin 80 milliGRAM(s) Oral at bedtime  carvedilol 3.125 milliGRAM(s) Oral every 12 hours  clopidogrel Tablet 75 milliGRAM(s) Oral daily  dextrose 5%. 1000 milliLiter(s) (50 mL/Hr) IV Continuous <Continuous>  dextrose 5%. 1000 milliLiter(s) (100 mL/Hr) IV Continuous <Continuous>  dextrose 50% Injectable 25 Gram(s) IV Push once  dextrose 50% Injectable 25 Gram(s) IV Push once  dextrose 50% Injectable 12.5 Gram(s) IV Push once  glucagon  Injectable 1 milliGRAM(s) IntraMuscular once  heparin   Injectable 7500 Unit(s) SubCutaneous every 8 hours  insulin glargine Injectable (LANTUS) 5 Unit(s) SubCutaneous at bedtime  insulin lispro (ADMELOG) corrective regimen sliding scale   SubCutaneous Before meals and at bedtime  insulin lispro Injectable (ADMELOG) 7 Unit(s) SubCutaneous three times a day before meals  lisinopril 40 milliGRAM(s) Oral daily  polyethylene glycol 3350 17 Gram(s) Oral daily  senna 2 Tablet(s) Oral at bedtime    MEDICATIONS  (PRN):  acetaminophen     Tablet .. 650 milliGRAM(s) Oral every 6 hours PRN Temp greater or equal to 38C (100.4F), Moderate Pain (4 - 6)  dextrose Oral Gel 15 Gram(s) Oral once PRN Blood Glucose LESS THAN 70 milliGRAM(s)/deciliter      ALLERGIES:  Allergies    codeine (Unknown)    Intolerances        LABS:                        11.5   7.29  )-----------( 214      ( 2023 05:30 )             35.1     06-07    138  |  111<H>  |  36<H>  ----------------------------<  129<H>  4.5   |  19<L>  |  1.74<H>    Ca    8.2<L>      2023 05:30  Phos  3.9     06-07  Mg     2.0     06-07        Urinalysis Basic - ( 2023 16:40 )    Color: Yellow / Appearance: Clear / S.020 / pH: x  Gluc: x / Ketone: NEGATIVE  / Bili: Negative / Urobili: 0.2 E.U./dL   Blood: x / Protein: 100 mg/dL / Nitrite: NEGATIVE   Leuk Esterase: NEGATIVE / RBC: < 5 /HPF / WBC 5-10 /HPF   Sq Epi: x / Non Sq Epi: x / Bacteria: Present /HPF      CAPILLARY BLOOD GLUCOSE      POCT Blood Glucose.: 117 mg/dL (2023 06:20)      RADIOLOGY & ADDITIONAL TESTS: Reviewed.

## 2023-06-08 NOTE — PROGRESS NOTE ADULT - SUBJECTIVE AND OBJECTIVE BOX
Feeling well today - daughter in law at the bedside.   No new neurological symptoms.  States that she is having bowel movements.   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  Cardiovascular: +S1/S2, RRR, no mrg  Respiratory: CTA B/L; no W/R/R  Gastrointestinal: soft, NT/ND; +BSx4  Extremities: WWP; no edema  Neurological: AAOx3; no focal deficits  Psychiatric: pleasant mood and affect  Dermatologic: no appreciable wounds or damage to the skin    VITAL SIGNS:  Vital Signs Last 24 Hrs  T(C): 37.1 (2023 09:06), Max: 37.3 (2023 13:39)  T(F): 98.7 (2023 09:06), Max: 99.2 (2023 13:39)  HR: 66 (2023 12:09) (63 - 80)  BP: 187/77 (2023 12:09) (136/61 - 187/77)  BP(mean): 111 (2023 12:09) (88 - 112)  RR: 18 (2023 12:09) (18 - 18)  SpO2: 97% (2023 12:09) (96% - 98%)    Parameters below as of 2023 12:09  Patient On (Oxygen Delivery Method): room air          MEDICATIONS:  MEDICATIONS  (STANDING):  amLODIPine   Tablet 10 milliGRAM(s) Oral daily  aspirin enteric coated 81 milliGRAM(s) Oral daily  atorvastatin 80 milliGRAM(s) Oral at bedtime  carvedilol 3.125 milliGRAM(s) Oral every 12 hours  clopidogrel Tablet 75 milliGRAM(s) Oral daily  dextrose 5%. 1000 milliLiter(s) (100 mL/Hr) IV Continuous <Continuous>  dextrose 5%. 1000 milliLiter(s) (50 mL/Hr) IV Continuous <Continuous>  dextrose 50% Injectable 25 Gram(s) IV Push once  dextrose 50% Injectable 25 Gram(s) IV Push once  dextrose 50% Injectable 12.5 Gram(s) IV Push once  glucagon  Injectable 1 milliGRAM(s) IntraMuscular once  heparin   Injectable 7500 Unit(s) SubCutaneous every 8 hours  insulin glargine Injectable (LANTUS) 5 Unit(s) SubCutaneous at bedtime  insulin lispro (ADMELOG) corrective regimen sliding scale   SubCutaneous Before meals and at bedtime  insulin lispro Injectable (ADMELOG) 7 Unit(s) SubCutaneous three times a day before meals  lisinopril 40 milliGRAM(s) Oral daily  polyethylene glycol 3350 17 Gram(s) Oral daily  senna 2 Tablet(s) Oral at bedtime    MEDICATIONS  (PRN):  acetaminophen     Tablet .. 650 milliGRAM(s) Oral every 6 hours PRN Temp greater or equal to 38C (100.4F), Moderate Pain (4 - 6)  dextrose Oral Gel 15 Gram(s) Oral once PRN Blood Glucose LESS THAN 70 milliGRAM(s)/deciliter      ALLERGIES:  Allergies    codeine (Unknown)    Intolerances        LABS:                        11.5   7.29  )-----------( 214      ( 2023 05:30 )             35.1     06-08    137  |  108  |  30<H>  ----------------------------<  144<H>  4.6   |  21<L>  |  1.56<H>    Ca    8.5      2023 05:30  Phos  3.5     06-08  Mg     1.9     06-08        Urinalysis Basic - ( 2023 16:40 )    Color: Yellow / Appearance: Clear / S.020 / pH: x  Gluc: x / Ketone: NEGATIVE  / Bili: Negative / Urobili: 0.2 E.U./dL   Blood: x / Protein: 100 mg/dL / Nitrite: NEGATIVE   Leuk Esterase: NEGATIVE / RBC: < 5 /HPF / WBC 5-10 /HPF   Sq Epi: x / Non Sq Epi: x / Bacteria: Present /HPF      CAPILLARY BLOOD GLUCOSE      POCT Blood Glucose.: 139 mg/dL (2023 11:36)      RADIOLOGY & ADDITIONAL TESTS: Reviewed.

## 2023-06-08 NOTE — PROGRESS NOTE ADULT - ASSESSMENT
64 y/o F with pmhx of [place] Palsy (left side), T2DM, CVA 2022 (left-sided deficits), CAD, HLD, HTN, Asthma, current smoker with a 52 pack year hx, ?AFib reportedly compliant on Eliquis who presented for evaluation of generalized weakness. Weakness started 5/21, got progressively worse 2-3 days ago. Daughter also noticed patient had been falling with occasional episodes of urinary incontinence. CTH with small vessel disease, CT C-spine negative for acute fx. Admitted to medicine for further w/u. Gen neuro consulted, recommended MRI brain w/o. Found to have multiple infarcts. Patient transferred to stroke tele for further management.     Neuro  #CVA workup  - d/c eliquis and started asa/plavix on 6/6. collateral obtained from outpt cards office: patient had a cryptogenic right thalamic stroke in 9/2019 and underwent loop recorder implant on 1/31/20 for said stroke. LR was checked in 8/2020 with no evidence of AF. 3/2021 patient admitted to [facility] for acute left basal ganglia and bilateral frontal lobe stroke. Loop recorder again showed no evidence of afib. Because stroke was suspicious for cardioembolic phenomenon the patient was started on apixaban. IWONA this admission w/o thrombus.  - continue atorvastatin 80mg daily  - q4hr stroke neuro checks and vitals  - MRI brain: small focus of restricted diffusion in the R petra, left pontomedullary junction and right frontal periventricular white matter  - Stroke Code HCT Results: small vessel disease  - MRA head without steno-occlusive disease, MRA neck without stenosis or occlusion  - B12: 447  - folate: 11.2  - kappa/lamda free light chain ratio: 1.66  - Stroke education  - EP interrogated ILR - no arrythmias  - f/u hypercoags  - f/u PET body    #urinary incontinence, ? hyperreflexia r/o cord compression   - CT C-spine: no fx   - MRI C/T/L spine without cord compression    Cards  #HTN  - goal sBP 120-180  - continue carvedilol 3.125mg q12h and Amlodipine 10mg. increased lisinopril from 20 to 40mg of 6/4  < from: Echocardiogram w/ Bubble and Doppler (06.06.23 @ 09:25) >     1. Mild symmetric left ventricular hypertrophy.   2. Low-normal left ventricular systolic function.   3. Normal right ventricular size and systolic function.   4. Normal atria.   5. Aortic sclerosis without significant stenosis.   6. No other significant valvular disease.   7. No evidence of pulmonary hypertension.   8. No pericardial effusion.   9. Injection of agitated saline via a peripheral vein reveals no   evidence of a right-to-left shunt.    < from: IWONA w/Doppler (06.06.23 @ 09:52) >   1. Normal left ventricular size and systolic function.   2. Normal right ventricular size and systolic function.   3. Mild tricuspid regurgitation.   4. No LA/RA/FRANCISCA/RAA thrombus seen.   5. There is an interatrial septal aneurysm. Minimal interatrial right to   left shunting noted predominantly with Valsalva suggestive of a tiny   patent foramen ovale.   6. There is severe non-mobile plaque seen in the visualized portion of   the descending aorta. There is severe non-mobile plaque seen in the   visualized portion of the aortic arch.   7. No pericardial effusion.    - Stroke Code EKG Results: NSR with L axis deviation and occasional Q waves but no active ischemic changes    #HLD  - high dose statin as above in CVA  - LDL results: 162    Pulm  - call provider if SPO2 < 94%    #asthma  - albuterol PRN    GI  #Nutrition/Fluids/Electrolytes   - replete K<4 and Mg <2  - Diet: Soft and bite sized  - MBS passed: soft and bite sized CC diet     Renal  #CKD   - Cr 1.7 on admission   - Will continue to trend  - collateral from PCP's office obtained:  5/2023 - Cr 1.57   9/2022 - Cr 1.38  6/2022 - Cr 1.34  - nephro consult placed for increasing creatinine  - f/u renal US  - f/u urine studies    Infectious Disease  - Stroke Code CXR results: negative    Endocrine  #DM  - A1C results: 9.8  - ISS  - continue Lantus 5U, 5U premeal in addition to sliding scale  - f/u endo recs    - TSH results: 2.000  - thyroid ultrasound: Dominant right thyroid lower pole nodule. Consider follow-up ultrasound in one year. Additional multiple spongiform benign-appearing thyroid nodules.      DVT Prophylaxis  - heparin + SCDs  - dopplers: neg    Dispo: Acute Rehab - can tolerate 3 hours of rehab     Discussed daily hospital plans and goals with patient    Discussed with Dr. Eli   62 y/o F with pmhx of [place] Palsy (left side), T2DM, CVA 2022 (left-sided deficits), CAD, HLD, HTN, Asthma, current smoker with a 52 pack year hx, ?AFib reportedly compliant on Eliquis who presented for evaluation of generalized weakness. Weakness started 5/21, got progressively worse 2-3 days ago. Daughter also noticed patient had been falling with occasional episodes of urinary incontinence. CTH with small vessel disease, CT C-spine negative for acute fx. Admitted to medicine for further w/u. Gen neuro consulted, recommended MRI brain w/o. MRI brain: small focus of restricted diffusion in the R petra, left pontomedullary junction and right frontal periventricular white matter. Patient transferred to stroke tele for further management. Eliquis discontinued and started asa/plavix on 6/6 as no indicated for AC. IWONA this admission w/o thrombus. EP interrogated ILR - no arrythmias. Nephro consulted for worsening CKD, renal sono neg. Recommend can continue with current antihypertensive therapy and optimize blood glucose with endocrinology. Can consider initiating SGLT-2 Inhibitor outpatient i/s/o CKD w/ proteinuria. Pending PET and hypercoags. Endo following. Stepped down to regional 6/8. Pending AR.      Neuro  #CVA workup  - d/c eliquis and started asa/plavix on 6/6. collateral obtained from outpt cards office: patient had a cryptogenic right thalamic stroke in 9/2019 and underwent loop recorder implant on 1/31/20 for said stroke. LR was checked in 8/2020 with no evidence of AF. 3/2021 patient admitted to [facility] for acute left basal ganglia and bilateral frontal lobe stroke. Loop recorder again showed no evidence of afib. Because stroke was suspicious for cardioembolic phenomenon the patient was started on apixaban. IWONA this admission w/o thrombus.  - continue atorvastatin 80mg daily  - q4hr stroke neuro checks and vitals  - MRI brain: small focus of restricted diffusion in the R petra, left pontomedullary junction and right frontal periventricular white matter  - Stroke Code HCT Results: small vessel disease  - MRA head without steno-occlusive disease, MRA neck without stenosis or occlusion  - B12: 447  - folate: 11.2  - kappa/lamda free light chain ratio: 1.66  - Stroke education  - EP interrogated ILR - no arrythmias  - f/u hypercoags  - f/u PET body    #urinary incontinence, ? hyperreflexia r/o cord compression   - CT C-spine: no fx   - MRI C/T/L spine without cord compression    Cards  #HTN  - goal sBP 120-180  - continue carvedilol 3.125mg q12h and Amlodipine 10mg. increased lisinopril from 20 to 40mg of 6/4  < from: Echocardiogram w/ Bubble and Doppler (06.06.23 @ 09:25) >     1. Mild symmetric left ventricular hypertrophy.   2. Low-normal left ventricular systolic function.   3. Normal right ventricular size and systolic function.   4. Normal atria.   5. Aortic sclerosis without significant stenosis.   6. No other significant valvular disease.   7. No evidence of pulmonary hypertension.   8. No pericardial effusion.   9. Injection of agitated saline via a peripheral vein reveals no   evidence of a right-to-left shunt.    < from: IWONA w/Doppler (06.06.23 @ 09:52) >   1. Normal left ventricular size and systolic function.   2. Normal right ventricular size and systolic function.   3. Mild tricuspid regurgitation.   4. No LA/RA/FRANCISCA/RAA thrombus seen.   5. There is an interatrial septal aneurysm. Minimal interatrial right to   left shunting noted predominantly with Valsalva suggestive of a tiny   patent foramen ovale.   6. There is severe non-mobile plaque seen in the visualized portion of   the descending aorta. There is severe non-mobile plaque seen in the   visualized portion of the aortic arch.   7. No pericardial effusion.    - Stroke Code EKG Results: NSR with L axis deviation and occasional Q waves but no active ischemic changes    #HLD  - high dose statin as above in CVA  - LDL results: 162    Pulm  - call provider if SPO2 < 94%    #asthma  - albuterol PRN    GI  #Nutrition/Fluids/Electrolytes   - replete K<4 and Mg <2  - Diet: Soft and bite sized  - MBS passed: soft and bite sized CC diet     Renal  #CKD   - Cr 1.7 on admission   - Will continue to trend  - collateral from PCP's office obtained:  5/2023 - Cr 1.57   9/2022 - Cr 1.38  6/2022 - Cr 1.34  - nephro consult placed for increasing creatinine  - f/u renal US  - f/u urine studies    Infectious Disease  - Stroke Code CXR results: negative    Endocrine  #DM  - A1C results: 9.8  - ISS  - continue Lantus 5U, 5U premeal in addition to sliding scale  - f/u endo recs    - TSH results: 2.000  - thyroid ultrasound: Dominant right thyroid lower pole nodule. Consider follow-up ultrasound in one year. Additional multiple spongiform benign-appearing thyroid nodules.      DVT Prophylaxis  - heparin + SCDs  - dopplers: neg    Dispo: Acute Rehab - can tolerate 3 hours of rehab     Discussed daily hospital plans and goals with patient    Discussed with Dr. Eli   62 y/o F with pmhx of [place] Palsy (left side), T2DM, CVA 2022 (left-sided deficits), CAD, HLD, HTN, Asthma, current smoker with a 52 pack year hx, ?AFib reportedly compliant on Eliquis who presented for evaluation of generalized weakness. Weakness started 5/21, got progressively worse 2-3 days ago. Daughter also noticed patient had been falling with occasional episodes of urinary incontinence. CTH with small vessel disease, CT C-spine negative for acute fx. Admitted to medicine for further w/u. Gen neuro consulted, recommended MRI brain w/o. MRI brain: small focus of restricted diffusion in the R petra, left pontomedullary junction and right frontal periventricular white matter. Patient transferred to stroke tele for further management. Eliquis discontinued and started asa/plavix on 6/6 as no indicated for AC. IWONA this admission w/o thrombus. EP interrogated ILR - no arrythmias. Nephro consulted for worsening CKD, renal sono neg. Recommend can continue with current antihypertensive therapy and optimize blood glucose with endocrinology. Can consider initiating SGLT-2 Inhibitor outpatient i/s/o CKD w/ proteinuria. Pending PET and hypercoags. Endo following. Stepped down to regional 6/8. Pending AR.      Neuro  #CVA workup  - d/c eliquis and started asa/plavix on 6/6. collateral obtained from outpt cards office: patient had a cryptogenic right thalamic stroke in 9/2019 and underwent loop recorder implant on 1/31/20 for said stroke. LR was checked in 8/2020 with no evidence of AF. 3/2021 patient admitted to [facility] for acute left basal ganglia and bilateral frontal lobe stroke. Loop recorder again showed no evidence of afib. Because stroke was suspicious for cardioembolic phenomenon the patient was started on apixaban. IWONA this admission w/o thrombus.  - continue atorvastatin 80mg daily  - q4hr stroke neuro checks and vitals  - MRI brain: small focus of restricted diffusion in the R petra, left pontomedullary junction and right frontal periventricular white matter  - Stroke Code HCT Results: small vessel disease  - MRA head without steno-occlusive disease, MRA neck without stenosis or occlusion  - B12: 447  - folate: 11.2  - kappa/lamda free light chain ratio: 1.66  - Stroke education  - EP interrogated ILR - no arrythmias  - f/u hypercoags  - f/u PET body    #urinary incontinence, ? hyperreflexia r/o cord compression   - CT C-spine: no fx   - MRI C/T/L spine without cord compression    Cards  #HTN  - goal sBP 120-160, gradual normotension  - continue carvedilol 3.125mg q12h and Amlodipine 10mg. increased lisinopril from 20 to 40mg of 6/4  - can increase carvedilol if pressures continuously above 160  < from: Echocardiogram w/ Bubble and Doppler (06.06.23 @ 09:25) >     1. Mild symmetric left ventricular hypertrophy.   2. Low-normal left ventricular systolic function.   3. Normal right ventricular size and systolic function.   4. Normal atria.   5. Aortic sclerosis without significant stenosis.   6. No other significant valvular disease.   7. No evidence of pulmonary hypertension.   8. No pericardial effusion.   9. Injection of agitated saline via a peripheral vein reveals no   evidence of a right-to-left shunt.    < from: IWONA w/Doppler (06.06.23 @ 09:52) >   1. Normal left ventricular size and systolic function.   2. Normal right ventricular size and systolic function.   3. Mild tricuspid regurgitation.   4. No LA/RA/FRANCISCA/RAA thrombus seen.   5. There is an interatrial septal aneurysm. Minimal interatrial right to   left shunting noted predominantly with Valsalva suggestive of a tiny   patent foramen ovale.   6. There is severe non-mobile plaque seen in the visualized portion of   the descending aorta. There is severe non-mobile plaque seen in the   visualized portion of the aortic arch.   7. No pericardial effusion.    - Stroke Code EKG Results: NSR with L axis deviation and occasional Q waves but no active ischemic changes    #HLD  - high dose statin as above in CVA  - LDL results: 162    Pulm  - call provider if SPO2 < 94%    #asthma  - albuterol PRN    GI  #Nutrition/Fluids/Electrolytes   - replete K<4 and Mg <2  - Diet: Soft and bite sized  - MBS passed: soft and bite sized CC diet     Renal  #CKD   - Cr 1.7 on admission   - Will continue to trend  - collateral from PCP's office obtained:  5/2023 - Cr 1.57   9/2022 - Cr 1.38  6/2022 - Cr 1.34  - nephro consult placed for increasing creatinine  - f/u renal US  - f/u urine studies    Infectious Disease  - Stroke Code CXR results: negative    Endocrine  #DM  - A1C results: 9.8  - ISS  - continue Lantus 5U, 5U premeal in addition to sliding scale  - f/u endo recs    - TSH results: 2.000  - thyroid ultrasound: Dominant right thyroid lower pole nodule. Consider follow-up ultrasound in one year. Additional multiple spongiform benign-appearing thyroid nodules.      DVT Prophylaxis  - heparin + SCDs  - dopplers: neg    Dispo: Acute Rehab - can tolerate 3 hours of rehab     Discussed daily hospital plans and goals with patient    Discussed with Dr. Eli

## 2023-06-08 NOTE — PROGRESS NOTE ADULT - PROBLEM SELECTOR PLAN 2
1 week history of urinary incontinence in addition to generalized weakness and dysarthria as noted above.  - please check bladder scan in the event that patient has issues with urinary retention and resultant overflow incontinence.  UA does not appear infected.   - no pathology on MRI in lumbar spine to explain incontinence  - outpatient follow up

## 2023-06-08 NOTE — PROGRESS NOTE ADULT - PROBLEM SELECTOR PLAN 6
Pt with BUN/Cr of 31/1.70 on admission. UA showing proteinuria and glucosuria.   -renal ultrasound  - does have persistent azotemia, however improving on repeat labs today.  Has been noncompliant with BP meds, one of which is lisinopril, which is indicated in cases of CKD, which is very likely in her situation given poorly controlled hypertension and diabetes  - continue with lisinopril  - consideration of SGLT-2 inhibitor as outpatient, for renal protection.

## 2023-06-08 NOTE — CONSULT NOTE ADULT - ASSESSMENT
nephrology consulted for elevated creatinine.     Assessment/Plan:   #CKD   UOP 3000cc/24h   UA w/protein  uPCR 3  baseline creatinine 1.5 - 1.7  Patient w/ hx of uncontrolled diabetes w/ a1c of 9.8 and uncontrolled hypertension. Likely has contributing chronic renal injury from diabetes and htn hx. Cr today at baseline levels. Patient does not have JUAN JOSÉ.       Recommend:   daily BMP   renal sono  strict I/Os   Can continue with current antihypertensive therapy and optimize blood glucose with endocrinology.   Can consider initiating SGLT-2 Inhibitor outpatient i/s/o CKD w/ proteinuria    Thank you for the opportunity to participate in the care of your patient. The nephrology service will SIGN OFF at this time.  Please feel free to reach us by paging the on-call nephrology fellow for urgent issues or as below.

## 2023-06-08 NOTE — PROGRESS NOTE ADULT - SUBJECTIVE AND OBJECTIVE BOX
SUBJECTIVE / INTERVAL HPI: Patient was seen and examined this morning. Patient feeling well. Planned for PET today. Reviewed how to apply freestyle parker with daughter and patient. Did not actually apply because it would need to removed for planned PET. Will apply right before discharge.    CAPILLARY BLOOD GLUCOSE & INSULIN RECEIVED  168 mg/dL (06-07 @ 16:16) - Lispro 5+2. Ate chicken and potato.  270 mg/dL (06-07 @ 21:55) - Lantus 5 + lispro 6  144 mg/dL (06-08 @ 05:30) - Lispro 7. Ate oatmeal and fruit cocktail.  117 mg/dL (06-08 @ 06:20)  139 mg/dL (06-08 @ 11:36)    REVIEW OF SYSTEMS  Constitutional:  Negative fever, chills or loss of appetite.  Eyes:  Negative blurry vision or double vision.  Cardiovascular:  Negative for chest pain or palpitations.  Respiratory:  Negative for cough, wheezing, or shortness of breath.    Gastrointestinal:  Negative for nausea, vomiting, diarrhea, constipation, or abdominal pain.  Genitourinary:  Negative frequency, urgency or dysuria.  Neurologic:  No headache, confusion, dizziness, lightheadedness. + weakness    PHYSICAL EXAM  Vital Signs Last 24 Hrs  T(C): 37.1 (08 Jun 2023 09:06), Max: 37.3 (07 Jun 2023 13:39)  T(F): 98.7 (08 Jun 2023 09:06), Max: 99.2 (07 Jun 2023 13:39)  HR: 66 (08 Jun 2023 12:09) (63 - 80)  BP: 187/77 (08 Jun 2023 12:09) (136/61 - 187/77)  BP(mean): 111 (08 Jun 2023 12:09) (88 - 112)  RR: 18 (08 Jun 2023 12:09) (18 - 18)  SpO2: 97% (08 Jun 2023 12:09) (96% - 98%)    Parameters below as of 08 Jun 2023 12:09  Patient On (Oxygen Delivery Method): room air    Constitutional: Awake, alert, in no acute distress.   HEENT: Normocephalic, atraumatic, left sided drooping notes (bell's)  Neck: supple, no acanthosis, no thyromegaly or palpable thyroid nodules.  Respiratory: Lungs clear to ausculation bilaterally.   Cardiovascular: regular rhythm, normal S1 and S2, no audible murmurs.   GI: soft, non-tender, non-distended, bowel sounds present, no masses appreciated.  Extremities: trace peripheral edema, peripheral pulses present.   Skin: no rashes.   Psychiatric: AAO x 3. Normal affect/mood.     LABS  CBC - WBC/HGB/HTC/PLT: 7.29/11.5/35.1/214 (06-08-23)  BMP - Na/K/Cl/Bicarb/BUN/Cr/Gluc/AG/eGFR: 137/4.6/108/21/30/1.56/144/8/37 (06-08-23)  Ca - 8.5 (06-08-23)  Phos - 3.5 (06-08-23)  Mg - 1.9 (06-08-23)  LFT - Alb/Tprot/Tbili/Dbili/AlkPhos/ALT/AST: 3.2/6.2/0.3/--/98/11/11 (06-03-23)    Thyroid Stimulating Hormone, Serum: 1.670 (06-04-23)  Total T4/Free T4: --/1.150 (06-04-23)    MEDICATIONS  MEDICATIONS  (STANDING):  amLODIPine   Tablet 10 milliGRAM(s) Oral daily  aspirin enteric coated 81 milliGRAM(s) Oral daily  atorvastatin 80 milliGRAM(s) Oral at bedtime  carvedilol 3.125 milliGRAM(s) Oral every 12 hours  clopidogrel Tablet 75 milliGRAM(s) Oral daily  dextrose 5%. 1000 milliLiter(s) (100 mL/Hr) IV Continuous <Continuous>  dextrose 5%. 1000 milliLiter(s) (50 mL/Hr) IV Continuous <Continuous>  dextrose 50% Injectable 25 Gram(s) IV Push once  dextrose 50% Injectable 25 Gram(s) IV Push once  dextrose 50% Injectable 12.5 Gram(s) IV Push once  glucagon  Injectable 1 milliGRAM(s) IntraMuscular once  heparin   Injectable 7500 Unit(s) SubCutaneous every 8 hours  insulin glargine Injectable (LANTUS) 5 Unit(s) SubCutaneous at bedtime  insulin lispro (ADMELOG) corrective regimen sliding scale   SubCutaneous Before meals and at bedtime  insulin lispro Injectable (ADMELOG) 7 Unit(s) SubCutaneous three times a day before meals  lisinopril 40 milliGRAM(s) Oral daily  polyethylene glycol 3350 17 Gram(s) Oral daily  senna 2 Tablet(s) Oral at bedtime    MEDICATIONS  (PRN):  acetaminophen     Tablet .. 650 milliGRAM(s) Oral every 6 hours PRN Temp greater or equal to 38C (100.4F), Moderate Pain (4 - 6)  dextrose Oral Gel 15 Gram(s) Oral once PRN Blood Glucose LESS THAN 70 milliGRAM(s)/deciliter

## 2023-06-08 NOTE — PROGRESS NOTE ADULT - ASSESSMENT
63F with PMH of [place] Palsy, DM2, CVA (left side weak), CAD, HLD, HTN, Asthma noted to have acute R pontine stroke.  On stroke service with medical comanagment

## 2023-06-08 NOTE — PROGRESS NOTE ADULT - PROBLEM SELECTOR PLAN 7
- TSH results: 2.000  - thyroid ultrasound: Dominant right thyroid lower pole nodule. Consider follow-up ultrasound in one year. Additional multiple spongiform benign-appearing thyroid nodules.

## 2023-06-08 NOTE — PROGRESS NOTE ADULT - ASSESSMENT
62 y/o F with pmhx of [place] Palsy (left side), T2DM, CVA 2022 (left-sided deficits), CAD, HLD, HTN, Asthma, current smoker with a 52 pack year hx, ?AFib reportedly compliant on Eliquis who presented for evaluation of generalized weakness, found to have small focus of restricted diffusion in R petra, L pontomedullary junction and R frontal periventricular white matter, pending AR placement.

## 2023-06-08 NOTE — PROGRESS NOTE ADULT - ASSESSMENT
62 y/o F with pmhx of [place] Palsy (left side), T2DM, CVA 2022 (left-sided deficits), CAD, HLD, HTN, Asthma, current smoker with a 52 pack year hx, ? AFib reportedly compliant on Eliquis who presented for evaluation of generalized weakness. Found to have R pontine infarct. Endocrinology consulted for diabetes management.    A1C: 9.8 %  BUN: 30  Creatinine: 1.56  GFR: 37  Weight: 96.6  BMI: 41.6

## 2023-06-08 NOTE — PROGRESS NOTE ADULT - PROBLEM SELECTOR PLAN 6
Pt with BUN/Cr of 31/1.70 on admission. UA showing proteinuria and glucosuria.   -renal ultrasound  - does have persistent azotemia, however improving on repeat labs today.  Has been noncompliant with BP meds, one of which is lisinopril, which is indicated in cases of CKD, which is very likely in her situation given poorly controlled hypertension and diabetes  - continue with lisinopril  - consideration of SGLT-2 inhibitor as outpatient, for renal protection

## 2023-06-08 NOTE — PROGRESS NOTE ADULT - ASSESSMENT
63F with PMH of [place] Palsy, DM2, CVA (left side weak), CAD, HLD, HTN, Asthma noted to have acute R pontine stroke.  On stroke service with medical comanagment

## 2023-06-08 NOTE — PROGRESS NOTE ADULT - SUBJECTIVE AND OBJECTIVE BOX
Neurology Stroke Progress Note    INTERVAL HPI/OVERNIGHT EVENTS:  Patient seen and examined. Denies acute neurological complaints.    MEDICATIONS  (STANDING):  amLODIPine   Tablet 10 milliGRAM(s) Oral daily  aspirin enteric coated 81 milliGRAM(s) Oral daily  atorvastatin 80 milliGRAM(s) Oral at bedtime  carvedilol 3.125 milliGRAM(s) Oral every 12 hours  clopidogrel Tablet 75 milliGRAM(s) Oral daily  dextrose 5%. 1000 milliLiter(s) (100 mL/Hr) IV Continuous <Continuous>  dextrose 5%. 1000 milliLiter(s) (50 mL/Hr) IV Continuous <Continuous>  dextrose 50% Injectable 25 Gram(s) IV Push once  dextrose 50% Injectable 25 Gram(s) IV Push once  dextrose 50% Injectable 12.5 Gram(s) IV Push once  glucagon  Injectable 1 milliGRAM(s) IntraMuscular once  heparin   Injectable 7500 Unit(s) SubCutaneous every 8 hours  insulin glargine Injectable (LANTUS) 5 Unit(s) SubCutaneous at bedtime  insulin lispro (ADMELOG) corrective regimen sliding scale   SubCutaneous Before meals and at bedtime  insulin lispro Injectable (ADMELOG) 7 Unit(s) SubCutaneous three times a day before meals  lisinopril 40 milliGRAM(s) Oral daily  polyethylene glycol 3350 17 Gram(s) Oral daily  senna 2 Tablet(s) Oral at bedtime    MEDICATIONS  (PRN):  acetaminophen     Tablet .. 650 milliGRAM(s) Oral every 6 hours PRN Temp greater or equal to 38C (100.4F), Moderate Pain (4 - 6)  dextrose Oral Gel 15 Gram(s) Oral once PRN Blood Glucose LESS THAN 70 milliGRAM(s)/deciliter      Allergies    codeine (Unknown)    Intolerances        Vital Signs Last 24 Hrs  T(C): 37.1 (2023 09:06), Max: 37.3 (2023 13:39)  T(F): 98.7 (2023 09:06), Max: 99.2 (2023 13:39)  HR: 71 (2023 08:35) (63 - 80)  BP: 175/76 (2023 08:35) (136/61 - 182/78)  BP(mean): 109 (2023 08:35) (88 - 112)  RR: 18 (2023 08:35) (18 - 18)  SpO2: 96% (2023 08:35) (96% - 98%)    Parameters below as of 2023 08:35  Patient On (Oxygen Delivery Method): room air        Physical exam:  General: awake and alert  Eyes: Anicteric sclerae, moist conjunctivae, see below for CNs  Extremities: RLE swelling > LLE    Neurologic:  -Mental status: Awake, alert, oriented to person and place, able to tell month, but not the year or day of week. Speech is fluent with intact naming, repetition, and comprehension, mildly dysarthric. Follows simple commands. Easily distractible.  -Cranial nerves:   II: Visual fields are diminished on left temporal side.  III, IV, VI: Extraocular movements are intact without nystagmus. Pupils equally round and reactive to light  V: Decreased sensation along L V1-V3 (residual from her prior hx of Bell's palsy) on the left side. Sensory deficits split down the middle of her face. Patient reports 'tingling' on left side when touched.  VII: R NLFF. Decreased forehead wrinkling on the L and left-sided droop when smiling.  XII: Tongue protrudes midline. Evidence of oral thrush.  Motor: Normal bulk and tone. B/l UE 4/5 without drift. B/l LEs 3-/5 with drift, do not hit the bed.  Sensation: Intact to light touch bilaterally in upper and lower extremities, with some evidence of stocking-glove neuropathy. No neglect or extinction on double simultaneous testing.  Coordination: No obvious dysmetria with finger-to-nose or heel-shin testing. No dysdyadokinesia.   Gait: Deferred  Reflexes: +2 bilateral upper and lower    LABS:                        11.5   7.29  )-----------( 214      ( 2023 05:30 )             35.1     06-08    137  |  108  |  30<H>  ----------------------------<  144<H>  4.6   |  21<L>  |  1.56<H>    Ca    8.5      2023 05:30  Phos  3.5     06-08  Mg     1.9     06-08        Urinalysis Basic - ( 2023 16:40 )    Color: Yellow / Appearance: Clear / S.020 / pH: x  Gluc: x / Ketone: NEGATIVE  / Bili: Negative / Urobili: 0.2 E.U./dL   Blood: x / Protein: 100 mg/dL / Nitrite: NEGATIVE   Leuk Esterase: NEGATIVE / RBC: < 5 /HPF / WBC 5-10 /HPF   Sq Epi: x / Non Sq Epi: x / Bacteria: Present /HPF        RADIOLOGY & ADDITIONAL TESTS:  reviewed   TRANSFER FROM STROKE [facility] TO [facility]    Hospital course: 64 y/o F with pmhx of [place] Palsy (left side), T2DM, CVA  (left-sided deficits), CAD, HLD, HTN, Asthma, current smoker with a 52 pack year hx, ?AFib reportedly compliant on Eliquis who presented for evaluation of generalized weakness. Weakness started , got progressively worse 2-3 days ago. Daughter also noticed patient had been falling with occasional episodes of urinary incontinence. CTH with small vessel disease, CT C-spine negative for acute fx. Admitted to medicine for further w/u. Gen neuro consulted, recommended MRI brain w/o. MRI brain: small focus of restricted diffusion in the R petra, left pontomedullary junction and right frontal periventricular white matter. Patient transferred to stroke [facility] for further management. Eliquis discontinued and started asa/plavix on . collateral obtained from outpt cards office: patient had a cryptogenic right thalamic stroke in 2019 and underwent loop recorder implant on 20 for said stroke. LR was checked in 2020 with no evidence of AF. 3/2021 patient admitted to [facility] for acute left basal ganglia and bilateral frontal lobe stroke. Loop recorder again showed no evidence of afib. Because stroke was suspicious for cardioembolic phenomenon the patient was started on apixaban. IWONA this admission w/o thrombus. EP interrogated ILR - no arrythmias. Nephro consulted for worsening CKD, renal sono neg. Recommend can continue with current antihypertensive therapy and optimize blood glucose with endocrinology. Can consider initiating SGLT-2 Inhibitor outpatient i/s/o CKD w/ proteinuria. Pending PET and hypercoags. Endo following. Stepped down to regional . Pending AR.    INTERVAL HPI/OVERNIGHT EVENTS:  Patient seen and examined. Denies acute neurological complaints.    MEDICATIONS  (STANDING):  amLODIPine   Tablet 10 milliGRAM(s) Oral daily  aspirin enteric coated 81 milliGRAM(s) Oral daily  atorvastatin 80 milliGRAM(s) Oral at bedtime  carvedilol 3.125 milliGRAM(s) Oral every 12 hours  clopidogrel Tablet 75 milliGRAM(s) Oral daily  dextrose 5%. 1000 milliLiter(s) (100 mL/Hr) IV Continuous <Continuous>  dextrose 5%. 1000 milliLiter(s) (50 mL/Hr) IV Continuous <Continuous>  dextrose 50% Injectable 25 Gram(s) IV Push once  dextrose 50% Injectable 25 Gram(s) IV Push once  dextrose 50% Injectable 12.5 Gram(s) IV Push once  glucagon  Injectable 1 milliGRAM(s) IntraMuscular once  heparin   Injectable 7500 Unit(s) SubCutaneous every 8 hours  insulin glargine Injectable (LANTUS) 5 Unit(s) SubCutaneous at bedtime  insulin lispro (ADMELOG) corrective regimen sliding scale   SubCutaneous Before meals and at bedtime  insulin lispro Injectable (ADMELOG) 7 Unit(s) SubCutaneous three times a day before meals  lisinopril 40 milliGRAM(s) Oral daily  polyethylene glycol 3350 17 Gram(s) Oral daily  senna 2 Tablet(s) Oral at bedtime    MEDICATIONS  (PRN):  acetaminophen     Tablet .. 650 milliGRAM(s) Oral every 6 hours PRN Temp greater or equal to 38C (100.4F), Moderate Pain (4 - 6)  dextrose Oral Gel 15 Gram(s) Oral once PRN Blood Glucose LESS THAN 70 milliGRAM(s)/deciliter      Allergies    codeine (Unknown)    Intolerances        Vital Signs Last 24 Hrs  T(C): 37.1 (2023 09:06), Max: 37.3 (2023 13:39)  T(F): 98.7 (2023 09:06), Max: 99.2 (2023 13:39)  HR: 71 (2023 08:35) (63 - 80)  BP: 175/76 (2023 08:35) (136/61 - 182/78)  BP(mean): 109 (2023 08:35) (88 - 112)  RR: 18 (2023 08:35) (18 - 18)  SpO2: 96% (2023 08:35) (96% - 98%)    Parameters below as of 2023 08:35  Patient On (Oxygen Delivery Method): room air        Physical exam:  General: awake and alert  Eyes: Anicteric sclerae, moist conjunctivae, see below for CNs  Extremities: RLE swelling > LLE    Neurologic:  -Mental status: Awake, alert, oriented to person and place, able to tell month, but not the year or day of week. Speech is fluent with intact naming, repetition, and comprehension, mildly dysarthric. Follows simple commands. Easily distractible.  -Cranial nerves:   II: Visual fields are diminished on left temporal side.  III, IV, VI: Extraocular movements are intact without nystagmus. Pupils equally round and reactive to light  V: Decreased sensation along L V1-V3 (residual from her prior hx of Bell's palsy) on the left side. Sensory deficits split down the middle of her face. Patient reports 'tingling' on left side when touched.  VII: R NLFF. Decreased forehead wrinkling on the L and left-sided droop when smiling.  XII: Tongue protrudes midline. Evidence of oral thrush.  Motor: Normal bulk and tone. B/l UE 4/5 without drift. B/l LEs 3-/5 with drift, do not hit the bed.  Sensation: Intact to light touch bilaterally in upper and lower extremities, with some evidence of stocking-glove neuropathy. No neglect or extinction on double simultaneous testing.  Coordination: No obvious dysmetria with finger-to-nose or heel-shin testing. No dysdyadokinesia.   Gait: Deferred  Reflexes: +2 bilateral upper and lower    LABS:                        11.5   7.29  )-----------( 214      ( 2023 05:30 )             35.1     06-08    137  |  108  |  30<H>  ----------------------------<  144<H>  4.6   |  21<L>  |  1.56<H>    Ca    8.5      2023 05:30  Phos  3.5     06-08  Mg     1.9     06-08        Urinalysis Basic - ( 2023 16:40 )    Color: Yellow / Appearance: Clear / S.020 / pH: x  Gluc: x / Ketone: NEGATIVE  / Bili: Negative / Urobili: 0.2 E.U./dL   Blood: x / Protein: 100 mg/dL / Nitrite: NEGATIVE   Leuk Esterase: NEGATIVE / RBC: < 5 /HPF / WBC 5-10 /HPF   Sq Epi: x / Non Sq Epi: x / Bacteria: Present /HPF        RADIOLOGY & ADDITIONAL TESTS:  reviewed   Neurology Progress Note    INTERVAL HPI/OVERNIGHT EVENTS:  Patient seen and examined. Denies acute neurological complaints.    MEDICATIONS  (STANDING):  amLODIPine   Tablet 10 milliGRAM(s) Oral daily  aspirin enteric coated 81 milliGRAM(s) Oral daily  atorvastatin 80 milliGRAM(s) Oral at bedtime  carvedilol 3.125 milliGRAM(s) Oral every 12 hours  clopidogrel Tablet 75 milliGRAM(s) Oral daily  dextrose 5%. 1000 milliLiter(s) (100 mL/Hr) IV Continuous <Continuous>  dextrose 5%. 1000 milliLiter(s) (50 mL/Hr) IV Continuous <Continuous>  dextrose 50% Injectable 25 Gram(s) IV Push once  dextrose 50% Injectable 25 Gram(s) IV Push once  dextrose 50% Injectable 12.5 Gram(s) IV Push once  glucagon  Injectable 1 milliGRAM(s) IntraMuscular once  heparin   Injectable 7500 Unit(s) SubCutaneous every 8 hours  insulin glargine Injectable (LANTUS) 5 Unit(s) SubCutaneous at bedtime  insulin lispro (ADMELOG) corrective regimen sliding scale   SubCutaneous Before meals and at bedtime  insulin lispro Injectable (ADMELOG) 7 Unit(s) SubCutaneous three times a day before meals  lisinopril 40 milliGRAM(s) Oral daily  polyethylene glycol 3350 17 Gram(s) Oral daily  senna 2 Tablet(s) Oral at bedtime    MEDICATIONS  (PRN):  acetaminophen     Tablet .. 650 milliGRAM(s) Oral every 6 hours PRN Temp greater or equal to 38C (100.4F), Moderate Pain (4 - 6)  dextrose Oral Gel 15 Gram(s) Oral once PRN Blood Glucose LESS THAN 70 milliGRAM(s)/deciliter      Allergies    codeine (Unknown)    Intolerances        Vital Signs Last 24 Hrs  T(C): 37.1 (2023 09:06), Max: 37.3 (2023 13:39)  T(F): 98.7 (2023 09:06), Max: 99.2 (2023 13:39)  HR: 71 (2023 08:35) (63 - 80)  BP: 175/76 (2023 08:35) (136/61 - 182/78)  BP(mean): 109 (2023 08:35) (88 - 112)  RR: 18 (2023 08:35) (18 - 18)  SpO2: 96% (2023 08:35) (96% - 98%)    Parameters below as of 2023 08:35  Patient On (Oxygen Delivery Method): room air        Physical exam:  General: awake and alert  Eyes: Anicteric sclerae, moist conjunctivae, see below for CNs  Extremities: RLE swelling > LLE    Neurologic:  -Mental status: Awake, alert, oriented to person and place, able to tell month, but not the year or day of week. Speech is fluent with intact naming, repetition, and comprehension, mildly dysarthric. Follows simple commands. Easily distractible.  -Cranial nerves:   II: Visual fields are diminished on left temporal side.  III, IV, VI: Extraocular movements are intact without nystagmus. Pupils equally round and reactive to light  V: Decreased sensation along L V1-V3 (residual from her prior hx of Bell's palsy) on the left side. Sensory deficits split down the middle of her face. Patient reports 'tingling' on left side when touched.  VII: R NLFF. Decreased forehead wrinkling on the L and left-sided droop when smiling.  XII: Tongue protrudes midline. Evidence of oral thrush.  Motor: Normal bulk and tone. B/l UE 4/5 without drift. B/l LEs 3-/5 with drift, do not hit the bed.  Sensation: Intact to light touch bilaterally in upper and lower extremities, with some evidence of stocking-glove neuropathy. No neglect or extinction on double simultaneous testing.  Coordination: No obvious dysmetria with finger-to-nose or heel-shin testing. No dysdyadokinesia.   Gait: Deferred  Reflexes: +2 bilateral upper and lower    LABS:                        11.5   7.29  )-----------( 214      ( 2023 05:30 )             35.1     06-08    137  |  108  |  30<H>  ----------------------------<  144<H>  4.6   |  21<L>  |  1.56<H>    Ca    8.5      2023 05:30  Phos  3.5     06-08  Mg     1.9     06-08        Urinalysis Basic - ( 2023 16:40 )    Color: Yellow / Appearance: Clear / S.020 / pH: x  Gluc: x / Ketone: NEGATIVE  / Bili: Negative / Urobili: 0.2 E.U./dL   Blood: x / Protein: 100 mg/dL / Nitrite: NEGATIVE   Leuk Esterase: NEGATIVE / RBC: < 5 /HPF / WBC 5-10 /HPF   Sq Epi: x / Non Sq Epi: x / Bacteria: Present /HPF        RADIOLOGY & ADDITIONAL TESTS:  reviewed

## 2023-06-08 NOTE — PROGRESS NOTE ADULT - SUBJECTIVE AND OBJECTIVE BOX
***STEPDOWN FROM STROKE TELE TO [facility]***  Hospital course: 62 y/o F with pmhx of [place] Palsy (left side), T2DM, CVA 2022 (left-sided deficits), CAD, HLD, HTN, Asthma, current smoker with a 52 pack year hx, ?AFib reportedly compliant on Eliquis who presented for evaluation of generalized weakness. Weakness started 5/21, got progressively worse 2-3 days ago. Daughter also noticed patient had been falling with occasional episodes of urinary incontinence. CTH with small vessel disease, CT C-spine negative for acute fx. Admitted to medicine for further w/u. Gen neuro consulted, recommended MRI brain w/o. MRI brain: small focus of restricted diffusion in the R petra, left pontomedullary junction and right frontal periventricular white matter. Patient transferred to stroke tele for further management. Eliquis discontinued and started asa/plavix on 6/6. collateral obtained from outpt cards office: patient had a cryptogenic right thalamic stroke in 9/2019 and underwent loop recorder implant on 1/31/20 for said stroke. LR was checked in 8/2020 with no evidence of AF. 3/2021 patient admitted to [facility] for acute left basal ganglia and bilateral frontal lobe stroke. Loop recorder again showed no evidence of afib. Because stroke was suspicious for cardioembolic phenomenon the patient was started on apixaban. IWONA this admission w/o thrombus. EP interrogated ILR - no arrythmias. Nephro consulted for worsening CKD, renal sono neg. Recommend can continue with current antihypertensive therapy and optimize blood glucose with endocrinology. Can consider initiating SGLT-2 Inhibitor outpatient i/s/o CKD w/ proteinuria. Pending PET and hypercoags. Endo following. Stepped down to regional 6/8. Pending AR.   ***STEPDOWN FROM STROKE TELE TO [facility]***  Hospital course: 64 y/o F with pmhx of [place] Palsy (left side), T2DM, CVA  (left-sided deficits), CAD, HLD, HTN, Asthma, current smoker with a 52 pack year hx, ?AFib reportedly compliant on Eliquis who presented for evaluation of generalized weakness. Weakness started , got progressively worse 2-3 days ago. Daughter also noticed patient had been falling with occasional episodes of urinary incontinence. CTH with small vessel disease, CT C-spine negative for acute fx. Admitted to medicine for further w/u. Gen neuro consulted, recommended MRI brain w/o. MRI brain: small focus of restricted diffusion in the R petra, left pontomedullary junction and right frontal periventricular white matter. Patient transferred to stroke tele for further management. Eliquis discontinued and started asa/plavix on . collateral obtained from outpt cards office: patient had a cryptogenic right thalamic stroke in 2019 and underwent loop recorder implant on 20 for said stroke. LR was checked in 2020 with no evidence of AF. 3/2021 patient admitted to [facility] for acute left basal ganglia and bilateral frontal lobe stroke. Loop recorder again showed no evidence of afib. Because stroke was suspicious for cardioembolic phenomenon the patient was started on apixaban. IWONA this admission w/o thrombus. EP interrogated ILR - no arrythmias. Nephro consulted for worsening CKD, renal sono neg. Recommend can continue with current antihypertensive therapy and optimize blood glucose with endocrinology. Can consider initiating SGLT-2 Inhibitor outpatient i/s/o CKD w/ proteinuria. Pending PET and hypercoags. Endo following. Stepped down to regional . Pending AR.    INTERVAL HPI/OVERNIGHT EVENTS:  Patient was seen and examined at bedside. Patient denies any complaints. Sitting in chair with friend at bedside. Denies any pain or headaches.     VITAL SIGNS:  T(F): 97.8 (23 @ 17:00)  HR: 68 (23 @ 16:36)  BP: 144/84 (23 @ 16:36)  RR: 18 (23 @ 16:36)  SpO2: 95% (23 @ 16:36)  Wt(kg): --      23 @ 07:01  -  23 @ 07:00  --------------------------------------------------------  IN: 1225 mL / OUT: 1950 mL / NET: -725 mL    23 @ 07:01  -  23 @ 19:00  --------------------------------------------------------  IN: 820 mL / OUT: 700 mL / NET: 120 mL        PHYSICAL EXAM:    Constitutional: resting comfortably in bed; NAD  HEENT: NC/AT, PER, anicteric sclera, no nasal discharge; MMM  Neck: supple; no JVD or thyromegaly  Respiratory: CTA B/L; no W/R/R, no retractions  Cardiac: +S1/S2; RRR; no M/R/G  Gastrointestinal: soft, NT/ND; no rebound or guarding  Extremities: WWP, no clubbing or cyanosis; no peripheral edema  Musculoskeletal: NROM x4; no joint swelling, tenderness or erythema  Vascular: 2+ radial, DP/PT pulses B/L  Neurologic: AAOx3; L facial droop     MEDICATIONS  (STANDING):  amLODIPine   Tablet 10 milliGRAM(s) Oral daily  aspirin enteric coated 81 milliGRAM(s) Oral daily  atorvastatin 80 milliGRAM(s) Oral at bedtime  carvedilol 3.125 milliGRAM(s) Oral every 12 hours  clopidogrel Tablet 75 milliGRAM(s) Oral daily  dextrose 5%. 1000 milliLiter(s) (50 mL/Hr) IV Continuous <Continuous>  dextrose 5%. 1000 milliLiter(s) (100 mL/Hr) IV Continuous <Continuous>  dextrose 50% Injectable 25 Gram(s) IV Push once  dextrose 50% Injectable 25 Gram(s) IV Push once  dextrose 50% Injectable 12.5 Gram(s) IV Push once  glucagon  Injectable 1 milliGRAM(s) IntraMuscular once  heparin   Injectable 7500 Unit(s) SubCutaneous every 8 hours  insulin glargine Injectable (LANTUS) 5 Unit(s) SubCutaneous at bedtime  insulin lispro (ADMELOG) corrective regimen sliding scale   SubCutaneous Before meals and at bedtime  insulin lispro Injectable (ADMELOG) 7 Unit(s) SubCutaneous three times a day before meals  lisinopril 40 milliGRAM(s) Oral daily  polyethylene glycol 3350 17 Gram(s) Oral daily  senna 2 Tablet(s) Oral at bedtime    MEDICATIONS  (PRN):  acetaminophen     Tablet .. 650 milliGRAM(s) Oral every 6 hours PRN Temp greater or equal to 38C (100.4F), Moderate Pain (4 - 6)  dextrose Oral Gel 15 Gram(s) Oral once PRN Blood Glucose LESS THAN 70 milliGRAM(s)/deciliter      Allergies    codeine (Unknown)    Intolerances        LABS:                        11.5   7.29  )-----------( 214      ( 2023 05:30 )             35.1     06-08    137  |  108  |  30<H>  ----------------------------<  144<H>  4.6   |  21<L>  |  1.56<H>    Ca    8.5      2023 05:30  Phos  3.5     06-08  Mg     1.9     06-08        Urinalysis Basic - ( 2023 16:40 )    Color: Yellow / Appearance: Clear / S.020 / pH: x  Gluc: x / Ketone: NEGATIVE  / Bili: Negative / Urobili: 0.2 E.U./dL   Blood: x / Protein: 100 mg/dL / Nitrite: NEGATIVE   Leuk Esterase: NEGATIVE / RBC: < 5 /HPF / WBC 5-10 /HPF   Sq Epi: x / Non Sq Epi: x / Bacteria: Present /HPF        RADIOLOGY & ADDITIONAL TESTS:  Reviewed

## 2023-06-08 NOTE — PROGRESS NOTE ADULT - PROBLEM SELECTOR PLAN 1
Type 2 diabetes mellitus - uncontrolled with hyperglycemia  -likely medication non-compliance vs adjustment needed   Home regimen: 75/25 insulin 20 units QD, Metformin 1000mg BID and Rybelsus 3mg QD. She will likely not need insulin at discharge.  - Please continue lantus 5 units at bedtime.   - Increase lispro to 7 units before each meal.  - Continue lispro moderate dose sliding scale four times daily with meals and at bedtime.  - Patient's fingerstick glucose goal is 100-180 mg/dL.    - For discharge, patient can continue metformin 1000mg BID and rybelsus 3mg daily. Stop insulin. Freestyle Ted sent to [facility] and covered. Delivered to bedside. Reviewed how to apply with daughter and patient. Did not actually apply because it would need to removed for planned PET. Will apply right before discharge.  - Patient to follow up with physician's in [place], has new endocrine appointment. Type 2 diabetes mellitus - uncontrolled with hyperglycemia  -likely medication non-compliance vs adjustment needed   Home regimen: 75/25 insulin 20 units QD, Metformin 1000mg BID and Rybelsus 3mg QD. She will likely not need insulin at discharge.  - Please continue lantus 5 units at bedtime.   - Continue lispro to 7 units before each meal.  - Continue lispro moderate dose sliding scale four times daily with meals and at bedtime.  - Patient's fingerstick glucose goal is 100-180 mg/dL.    - For discharge, patient can continue metformin 1000mg BID and rybelsus 3mg daily. Stop insulin. Freestyle Ted sent to [facility] and covered. Delivered to bedside. Reviewed how to apply with daughter and patient. Did not actually apply because it would need to removed for planned PET. Will apply right before discharge.  - Patient to follow up with physician's in [place], has new endocrine appointment.

## 2023-06-08 NOTE — PROGRESS NOTE ADULT - PROBLEM SELECTOR PLAN 2
There is 2.2 x 1.9 x 1.6 cm heterogeneous predominantly isoechoic nodule   in the lower pole of right lobe.  Additional multiple spongiform benign-appearing thyroid nodules in left lobe  Repeat US in 6 months to determine if FNA is needed due to size >2cm  TFTs normal. There is 2.2 x 1.9 x 1.6 cm heterogeneous predominantly isoechoic nodule   in the lower pole of right lobe.  Additional multiple spongiform benign-appearing thyroid nodules in left lobe  Repeat US in 6 months to determine if FNA is needed due to size >2cm. Discussed with daughter.  TFTs normal.

## 2023-06-08 NOTE — CONSULT NOTE ADULT - SUBJECTIVE AND OBJECTIVE BOX
Patient is a 63y old  Female who presents with a chief complaint of stroke (2023 13:00)      HPI:  Ms. Hendricks is a 64 y/o F with a PMHx of [place] Palsy (on the left side), TIIDM, CVA (left side weak), CAD, HLD, HTN, Asthma, current smoker with a 52 pack year hx, brought for evaluation of worsening generalized weakness since . Pt states she has had a CVA in the past with residual L sided weakness in her leg and arm, however, pt began to notice continual weakness in both her upper and lower extremities. Pt states she was taken to an ER in [place] for pain in her L ankle and was d/c after no DVT was discovered. Since then, pt states she lost her balance upon getting out of bed 2x and presented to ER at [facility]  for further evaluation. Pt states she has had increasingly difficulty in using her wrists and ankles. Pt denies diarrheal illness previously, denies SOB and cough, palpitations and CP, headaches, fevers, chills, nausea, vomiting, diarrhea, constipation, dysuria, hematuria, hematochezia. Pt noted to have had some urinary hesitancy. Daughter Rosemarie at bedside [phone], states her mother has poor compliance with her medications, states she has mostly sedentary lifestyle.     ED Course:  ED Vitals: Initial: T 97.8, HR 70, /82, satting 99% on RA   Notable labs: WBC 7.98, Hgb/Hct 12.7/37.2, platelets 242; Na 137, K 4.7, Cl 105, CO2 24, BUN/Cr 31/1.70, Glucose 360, troponin 0.01   UA: Protein, small blood, RBC, bacteria, + Glucose   CXR: No evidence of acute cardiopulmonary disease  CTH: No acute intracranial hemorrhage, mass effect, or demarcated recent infarction, small vessel ischemic change throughout cerebral white matter and deep gray/white matter lacunae.  CT Cervical Spine: No fracture   EKG: NSR with L axis deviation and occasional Q waves but no active ischemic changes   Pt was admitted to [facility] for further workup of generalized weakness (2023 17:54)  No NSAID use. Nephrology consulted for elevated creatinine.     PAST MEDICAL & SURGICAL HISTORY:        Allergies:  codeine (Unknown)      Home Medications:   acetaminophen     Tablet .. 650 milliGRAM(s) Oral every 6 hours PRN  amLODIPine   Tablet 10 milliGRAM(s) Oral daily  aspirin enteric coated 81 milliGRAM(s) Oral daily  atorvastatin 80 milliGRAM(s) Oral at bedtime  carvedilol 3.125 milliGRAM(s) Oral every 12 hours  clopidogrel Tablet 75 milliGRAM(s) Oral daily  dextrose 5%. 1000 milliLiter(s) IV Continuous <Continuous>  dextrose 5%. 1000 milliLiter(s) IV Continuous <Continuous>  dextrose 50% Injectable 25 Gram(s) IV Push once  dextrose 50% Injectable 25 Gram(s) IV Push once  dextrose 50% Injectable 12.5 Gram(s) IV Push once  dextrose Oral Gel 15 Gram(s) Oral once PRN  glucagon  Injectable 1 milliGRAM(s) IntraMuscular once  heparin   Injectable 7500 Unit(s) SubCutaneous every 8 hours  insulin glargine Injectable (LANTUS) 5 Unit(s) SubCutaneous at bedtime  insulin lispro (ADMELOG) corrective regimen sliding scale   SubCutaneous Before meals and at bedtime  insulin lispro Injectable (ADMELOG) 7 Unit(s) SubCutaneous three times a day before meals  lisinopril 40 milliGRAM(s) Oral daily  polyethylene glycol 3350 17 Gram(s) Oral daily  senna 2 Tablet(s) Oral at bedtime      Hospital Medications:   MEDICATIONS  (STANDING):  amLODIPine   Tablet 10 milliGRAM(s) Oral daily  aspirin enteric coated 81 milliGRAM(s) Oral daily  atorvastatin 80 milliGRAM(s) Oral at bedtime  carvedilol 3.125 milliGRAM(s) Oral every 12 hours  clopidogrel Tablet 75 milliGRAM(s) Oral daily  dextrose 5%. 1000 milliLiter(s) (100 mL/Hr) IV Continuous <Continuous>  dextrose 5%. 1000 milliLiter(s) (50 mL/Hr) IV Continuous <Continuous>  dextrose 50% Injectable 25 Gram(s) IV Push once  dextrose 50% Injectable 25 Gram(s) IV Push once  dextrose 50% Injectable 12.5 Gram(s) IV Push once  glucagon  Injectable 1 milliGRAM(s) IntraMuscular once  heparin   Injectable 7500 Unit(s) SubCutaneous every 8 hours  insulin glargine Injectable (LANTUS) 5 Unit(s) SubCutaneous at bedtime  insulin lispro (ADMELOG) corrective regimen sliding scale   SubCutaneous Before meals and at bedtime  insulin lispro Injectable (ADMELOG) 7 Unit(s) SubCutaneous three times a day before meals  lisinopril 40 milliGRAM(s) Oral daily  polyethylene glycol 3350 17 Gram(s) Oral daily  senna 2 Tablet(s) Oral at bedtime      SOCIAL HISTORY:  Denies ETOh, smoking, or drug use.     Family History:  FAMILY HISTORY:        VITALS:  T(F): 98.7 (23 @ 09:06), Max: 99.2 (23 @ 13:39)  HR: 66 (23 @ 12:09)  BP: 187/77 (23 @ 12:09)  RR: 18 (23 @ 12:09)  SpO2: 97% (23 @ 12:09)  Wt(kg): --     @ 07:01  -   @ 07:00  --------------------------------------------------------  IN: 1225 mL / OUT: 1950 mL / NET: -725 mL     @ 07:01  -   @ 12:29  --------------------------------------------------------  IN: 480 mL / OUT: 700 mL / NET: -220 mL        CAPILLARY BLOOD GLUCOSE      POCT Blood Glucose.: 139 mg/dL (2023 11:36)  POCT Blood Glucose.: 117 mg/dL (2023 06:20)  POCT Blood Glucose.: 270 mg/dL (2023 21:55)  POCT Blood Glucose.: 168 mg/dL (2023 16:16)      Review of Systems:  CONSTITUTIONAL: No fever or weight loss   RESPIRATORY: No shortness of breath, cough  CARDIOVASCULAR: No Chest pain, shortness of breath   GASTROINTESTINAL: No abdominal pain, nausea, vomiting, diarrhea  GENITOURINARY: No urinary frequency, gross hematuria, dysuria  NEUROLOGICAL: No headache, weakness  SKIN: No rash or skin lesion  MUSCULOSKELETAL: No swelling or pain   Psych: Denies suicidal or homicidal ideation     PHYSICAL EXAM:  GENERAL: Alert, awake, oriented x3   HEENT: ANDREA, EOMI, neck supple, no JVP  CHEST/LUNG: Bilateral clear breath sounds  HEART: Regular rate and rhythm, no murmur, no gallops, no rub   ABDOMEN: Soft, nontender, non distended  : No flank or supra pubic tenderness.  EXTREMITIES: no pedal edema  Neurology: AAOx3, moves all extremities   SKIN: No rash or skin lesion     LABS:      137  |  108  |  30<H>  ----------------------------<  144<H>  4.6   |  21<L>  |  1.56<H>    Ca    8.5      2023 05:30  Phos  3.5       Mg     1.9           Creatinine Trend: 1.56 <--, 1.74 <--, 1.77 <--, 1.77 <--, 1.56 <--, 1.46 <--, 1.70 <--                        11.5   7.29  )-----------( 214      ( 2023 05:30 )             35.1     Urine Studies:  Urinalysis Basic - ( 2023 16:40 )    Color: Yellow / Appearance: Clear / S.020 / pH:   Gluc:  / Ketone: NEGATIVE  / Bili: Negative / Urobili: 0.2 E.U./dL   Blood:  / Protein: 100 mg/dL / Nitrite: NEGATIVE   Leuk Esterase: NEGATIVE / RBC: < 5 /HPF / WBC 5-10 /HPF   Sq Epi:  / Non Sq Epi:  / Bacteria: Present /HPF      Sodium, Random Urine: 51 mmol/L ( @ 16:40)  Creatinine, Random Urine: 53 mg/dL ( @ 16:40)  Protein/Creatinine Ratio Calculation: 3.3 Ratio ( @ 16:40)  Osmolality, Random Urine: 383 mosm/kg ( @ 16:40)  Potassium, Random Urine: 42 mmol/L ( @ 16:40)  Sodium, Random Urine: 67 mmol/L ( @ 18:51)  Creatinine, Random Urine: 39 mg/dL ( @ 18:51)  Protein/Creatinine Ratio Calculation: 5.0 Ratio ( @ 18:51)  Osmolality, Random Urine: 401 mosm/kg ( @ 18:51)  Potassium, Random Urine: 24 mmol/L ( @ 18:51)         Patient is a 63y old  Female who presents with a chief complaint of stroke (2023 13:00)      HPI:  Ms. Hendricks is a 62 y/o F with a PMHx of [place] Palsy (on the left side), TIIDM, CVA (left side weak), CAD, HLD, HTN, Asthma, current smoker with a 52 pack year hx, brought for evaluation of worsening generalized weakness since . Pt states she has had a CVA in the past with residual L sided weakness in her leg and arm, however, pt began to notice continual weakness in both her upper and lower extremities. Pt states she was taken to an ER in [place] for pain in her L ankle and was d/c after no DVT was discovered. Since then, pt states she lost her balance upon getting out of bed 2x and presented to ER at [facility]  for further evaluation. Pt states she has had increasingly difficulty in using her wrists and ankles. Pt denies diarrheal illness previously, denies SOB and cough, palpitations and CP, headaches, fevers, chills, nausea, vomiting, diarrhea, constipation, dysuria, hematuria, hematochezia. Pt noted to have had some urinary hesitancy. Daughter Rosemarie at bedside [phone], states her mother has poor compliance with her medications, states she has mostly sedentary lifestyle. Nephrology consulted on day 6 of hospital stay for ongoing azotemia.        PAST MEDICAL & SURGICAL HISTORY:        Allergies:  codeine (Unknown)      Home Medications:   acetaminophen     Tablet .. 650 milliGRAM(s) Oral every 6 hours PRN  amLODIPine   Tablet 10 milliGRAM(s) Oral daily  aspirin enteric coated 81 milliGRAM(s) Oral daily  atorvastatin 80 milliGRAM(s) Oral at bedtime  carvedilol 3.125 milliGRAM(s) Oral every 12 hours  clopidogrel Tablet 75 milliGRAM(s) Oral daily  dextrose 5%. 1000 milliLiter(s) IV Continuous <Continuous>  dextrose 5%. 1000 milliLiter(s) IV Continuous <Continuous>  dextrose 50% Injectable 25 Gram(s) IV Push once  dextrose 50% Injectable 25 Gram(s) IV Push once  dextrose 50% Injectable 12.5 Gram(s) IV Push once  dextrose Oral Gel 15 Gram(s) Oral once PRN  glucagon  Injectable 1 milliGRAM(s) IntraMuscular once  heparin   Injectable 7500 Unit(s) SubCutaneous every 8 hours  insulin glargine Injectable (LANTUS) 5 Unit(s) SubCutaneous at bedtime  insulin lispro (ADMELOG) corrective regimen sliding scale   SubCutaneous Before meals and at bedtime  insulin lispro Injectable (ADMELOG) 7 Unit(s) SubCutaneous three times a day before meals  lisinopril 40 milliGRAM(s) Oral daily  polyethylene glycol 3350 17 Gram(s) Oral daily  senna 2 Tablet(s) Oral at bedtime      Hospital Medications:   MEDICATIONS  (STANDING):  amLODIPine   Tablet 10 milliGRAM(s) Oral daily  aspirin enteric coated 81 milliGRAM(s) Oral daily  atorvastatin 80 milliGRAM(s) Oral at bedtime  carvedilol 3.125 milliGRAM(s) Oral every 12 hours  clopidogrel Tablet 75 milliGRAM(s) Oral daily  dextrose 5%. 1000 milliLiter(s) (100 mL/Hr) IV Continuous <Continuous>  dextrose 5%. 1000 milliLiter(s) (50 mL/Hr) IV Continuous <Continuous>  dextrose 50% Injectable 25 Gram(s) IV Push once  dextrose 50% Injectable 25 Gram(s) IV Push once  dextrose 50% Injectable 12.5 Gram(s) IV Push once  glucagon  Injectable 1 milliGRAM(s) IntraMuscular once  heparin   Injectable 7500 Unit(s) SubCutaneous every 8 hours  insulin glargine Injectable (LANTUS) 5 Unit(s) SubCutaneous at bedtime  insulin lispro (ADMELOG) corrective regimen sliding scale   SubCutaneous Before meals and at bedtime  insulin lispro Injectable (ADMELOG) 7 Unit(s) SubCutaneous three times a day before meals  lisinopril 40 milliGRAM(s) Oral daily  polyethylene glycol 3350 17 Gram(s) Oral daily  senna 2 Tablet(s) Oral at bedtime      SOCIAL HISTORY:  Denies ETOh, smoking, or drug use.     Family History:  FAMILY HISTORY:        VITALS:  T(F): 98.7 (23 @ 09:06), Max: 99.2 (23 @ 13:39)  HR: 66 (23 @ 12:09)  BP: 187/77 (23 @ 12:09)  RR: 18 (23 @ 12:09)  SpO2: 97% (23 @ 12:09)  Wt(kg): --     @ 07:01  -  06-08 @ 07:00  --------------------------------------------------------  IN: 1225 mL / OUT: 1950 mL / NET: -725 mL     @ 07:01   @ 12:29  --------------------------------------------------------  IN: 480 mL / OUT: 700 mL / NET: -220 mL        CAPILLARY BLOOD GLUCOSE      POCT Blood Glucose.: 139 mg/dL (2023 11:36)  POCT Blood Glucose.: 117 mg/dL (2023 06:20)  POCT Blood Glucose.: 270 mg/dL (2023 21:55)  POCT Blood Glucose.: 168 mg/dL (2023 16:16)      Review of Systems:  CONSTITUTIONAL: No fever or weight loss   RESPIRATORY: No shortness of breath, cough  CARDIOVASCULAR: No Chest pain, shortness of breath   GASTROINTESTINAL: No abdominal pain, nausea, vomiting, diarrhea  GENITOURINARY: No urinary frequency, gross hematuria, dysuria  NEUROLOGICAL: No headache, weakness  SKIN: No rash or skin lesion  MUSCULOSKELETAL: No swelling or pain   Psych: Denies suicidal or homicidal ideation     PHYSICAL EXAM:  GENERAL: Alert, awake, oriented x3   HEENT: ANDREA, EOMI, neck supple, no JVP  CHEST/LUNG: Bilateral clear breath sounds  HEART: Regular rate and rhythm, no murmur, no gallops, no rub   ABDOMEN: Soft, nontender, non distended  : No flank or supra pubic tenderness.  EXTREMITIES: no pedal edema  Neurology: AAOx3, moves all extremities   SKIN: No rash or skin lesion     LABS:      137  |  108  |  30<H>  ----------------------------<  144<H>  4.6   |  21<L>  |  1.56<H>    Ca    8.5      2023 05:30  Phos  3.5     06-08  Mg     1.9     06-08      Creatinine Trend: 1.56 <--, 1.74 <--, 1.77 <--, 1.77 <--, 1.56 <--, 1.46 <--, 1.70 <--                        11.5   7.29  )-----------( 214      ( 2023 05:30 )             35.1     Urine Studies:  Urinalysis Basic - ( 2023 16:40 )    Color: Yellow / Appearance: Clear / S.020 / pH:   Gluc:  / Ketone: NEGATIVE  / Bili: Negative / Urobili: 0.2 E.U./dL   Blood:  / Protein: 100 mg/dL / Nitrite: NEGATIVE   Leuk Esterase: NEGATIVE / RBC: < 5 /HPF / WBC 5-10 /HPF   Sq Epi:  / Non Sq Epi:  / Bacteria: Present /HPF      Sodium, Random Urine: 51 mmol/L ( @ 16:40)  Creatinine, Random Urine: 53 mg/dL ( @ 16:40)  Protein/Creatinine Ratio Calculation: 3.3 Ratio ( @ 16:40)  Osmolality, Random Urine: 383 mosm/kg ( @ 16:40)  Potassium, Random Urine: 42 mmol/L ( @ 16:40)  Sodium, Random Urine: 67 mmol/L ( @ 18:51)  Creatinine, Random Urine: 39 mg/dL ( @ 18:51)  Protein/Creatinine Ratio Calculation: 5.0 Ratio ( @ 18:51)  Osmolality, Random Urine: 401 mosm/kg ( @ 18:51)  Potassium, Random Urine: 24 mmol/L ( @ 18:51)

## 2023-06-08 NOTE — PROGRESS NOTE ADULT - PROBLEM SELECTOR PLAN 3
- goal sBP 120-160, gradual normotension  - continue carvedilol 3.125mg q12h and Amlodipine 10mg. increased lisinopril from 20 to 40mg of 6/4  - can increase carvedilol if pressures continuously above 160    #HLD  - high dose statin as above  -

## 2023-06-08 NOTE — PROGRESS NOTE ADULT - PROBLEM SELECTOR PLAN 1
Collateral obtained from outpt cards office: patient had a cryptogenic right thalamic stroke in 9/2019 and underwent loop recorder implant on 1/31/20 for said stroke. LR was checked in 8/2020 with no evidence of AF. 3/2021 patient admitted to [facility] for acute left basal ganglia and bilateral frontal lobe stroke. Loop recorder again showed no evidence of afib. Because stroke was suspicious for cardioembolic phenomenon the patient was started on apixaban. IWONA this admission w/o thrombus.  MRI brain: small focus of restricted diffusion in the R petra, left pontomedullary junction and right frontal periventricular white matter. Stroke Code HCT Results: small vessel disease. MRA head without steno-occlusive disease, MRA neck without stenosis or occlusion.  B12: 447, folate: 11.2, kappa/lamda free light chain ratio: 1.66  - EP interrogated ILR - no arrythmias  - D/kaylie eliquis and started asa/plavix on 6/6  - f/u hypercoags  - f/u PET body  - q4 stroke neuro checks and vitals  - stroke education

## 2023-06-08 NOTE — PROGRESS NOTE ADULT - PROBLEM SELECTOR PLAN 4
continue with sliding scale  continue with 7U premeal, sliding scale and 5U of lantus.  - within goal of 140-180

## 2023-06-09 LAB
ANION GAP SERPL CALC-SCNC: 10 MMOL/L — SIGNIFICANT CHANGE UP (ref 5–17)
APCR PPP: 2.05 RATIO — LOW
AT III ACT/NOR PPP CHRO: 107 % — SIGNIFICANT CHANGE UP (ref 85–135)
B2 GLYCOPROT1 AB SER QL: POSITIVE
BUN SERPL-MCNC: 26 MG/DL — HIGH (ref 7–23)
CALCIUM SERPL-MCNC: 8 MG/DL — LOW (ref 8.4–10.5)
CARDIOLIPIN AB SER-ACNC: POSITIVE
CHLORIDE SERPL-SCNC: 107 MMOL/L — SIGNIFICANT CHANGE UP (ref 96–108)
CO2 SERPL-SCNC: 21 MMOL/L — LOW (ref 22–31)
CREAT SERPL-MCNC: 1.47 MG/DL — HIGH (ref 0.5–1.3)
EGFR: 39 ML/MIN/1.73M2 — LOW
GLUCOSE BLDC GLUCOMTR-MCNC: 105 MG/DL — HIGH (ref 70–99)
GLUCOSE BLDC GLUCOMTR-MCNC: 113 MG/DL — HIGH (ref 70–99)
GLUCOSE BLDC GLUCOMTR-MCNC: 154 MG/DL — HIGH (ref 70–99)
GLUCOSE BLDC GLUCOMTR-MCNC: 91 MG/DL — SIGNIFICANT CHANGE UP (ref 70–99)
GLUCOSE BLDC GLUCOMTR-MCNC: 93 MG/DL — SIGNIFICANT CHANGE UP (ref 70–99)
GLUCOSE SERPL-MCNC: 74 MG/DL — SIGNIFICANT CHANGE UP (ref 70–99)
HCT VFR BLD CALC: 32.7 % — LOW (ref 34.5–45)
HGB BLD-MCNC: 11 G/DL — LOW (ref 11.5–15.5)
MAGNESIUM SERPL-MCNC: 1.9 MG/DL — SIGNIFICANT CHANGE UP (ref 1.6–2.6)
MCHC RBC-ENTMCNC: 27.1 PG — SIGNIFICANT CHANGE UP (ref 27–34)
MCHC RBC-ENTMCNC: 33.6 GM/DL — SIGNIFICANT CHANGE UP (ref 32–36)
MCV RBC AUTO: 80.5 FL — SIGNIFICANT CHANGE UP (ref 80–100)
NRBC # BLD: 0 /100 WBCS — SIGNIFICANT CHANGE UP (ref 0–0)
PHOSPHATE SERPL-MCNC: 3.7 MG/DL — SIGNIFICANT CHANGE UP (ref 2.5–4.5)
PLATELET # BLD AUTO: 199 K/UL — SIGNIFICANT CHANGE UP (ref 150–400)
POTASSIUM SERPL-MCNC: 4.3 MMOL/L — SIGNIFICANT CHANGE UP (ref 3.5–5.3)
POTASSIUM SERPL-SCNC: 4.3 MMOL/L — SIGNIFICANT CHANGE UP (ref 3.5–5.3)
PROT C ACT/NOR PPP: 99 % — SIGNIFICANT CHANGE UP (ref 74–150)
RBC # BLD: 4.06 M/UL — SIGNIFICANT CHANGE UP (ref 3.8–5.2)
RBC # FLD: 14.5 % — SIGNIFICANT CHANGE UP (ref 10.3–14.5)
SODIUM SERPL-SCNC: 138 MMOL/L — SIGNIFICANT CHANGE UP (ref 135–145)
WBC # BLD: 6.15 K/UL — SIGNIFICANT CHANGE UP (ref 3.8–10.5)
WBC # FLD AUTO: 6.15 K/UL — SIGNIFICANT CHANGE UP (ref 3.8–10.5)

## 2023-06-09 PROCEDURE — 99232 SBSQ HOSP IP/OBS MODERATE 35: CPT

## 2023-06-09 PROCEDURE — 99221 1ST HOSP IP/OBS SF/LOW 40: CPT

## 2023-06-09 PROCEDURE — 99231 SBSQ HOSP IP/OBS SF/LOW 25: CPT

## 2023-06-09 RX ORDER — INSULIN GLARGINE 100 [IU]/ML
3 INJECTION, SOLUTION SUBCUTANEOUS AT BEDTIME
Refills: 0 | Status: DISCONTINUED | OUTPATIENT
Start: 2023-06-09 | End: 2023-06-12

## 2023-06-09 RX ADMIN — CLOPIDOGREL BISULFATE 75 MILLIGRAM(S): 75 TABLET, FILM COATED ORAL at 11:34

## 2023-06-09 RX ADMIN — Medication 81 MILLIGRAM(S): at 11:34

## 2023-06-09 RX ADMIN — CARVEDILOL PHOSPHATE 6.25 MILLIGRAM(S): 80 CAPSULE, EXTENDED RELEASE ORAL at 04:25

## 2023-06-09 RX ADMIN — LIDOCAINE 1 PATCH: 4 CREAM TOPICAL at 11:27

## 2023-06-09 RX ADMIN — ATORVASTATIN CALCIUM 80 MILLIGRAM(S): 80 TABLET, FILM COATED ORAL at 22:15

## 2023-06-09 RX ADMIN — AMLODIPINE BESYLATE 10 MILLIGRAM(S): 2.5 TABLET ORAL at 04:25

## 2023-06-09 RX ADMIN — HEPARIN SODIUM 7500 UNIT(S): 5000 INJECTION INTRAVENOUS; SUBCUTANEOUS at 22:13

## 2023-06-09 RX ADMIN — INSULIN GLARGINE 3 UNIT(S): 100 INJECTION, SOLUTION SUBCUTANEOUS at 22:24

## 2023-06-09 RX ADMIN — Medication 7 UNIT(S): at 16:50

## 2023-06-09 RX ADMIN — Medication 7 UNIT(S): at 07:09

## 2023-06-09 RX ADMIN — CARVEDILOL PHOSPHATE 6.25 MILLIGRAM(S): 80 CAPSULE, EXTENDED RELEASE ORAL at 17:06

## 2023-06-09 RX ADMIN — HEPARIN SODIUM 7500 UNIT(S): 5000 INJECTION INTRAVENOUS; SUBCUTANEOUS at 16:49

## 2023-06-09 RX ADMIN — POLYETHYLENE GLYCOL 3350 17 GRAM(S): 17 POWDER, FOR SOLUTION ORAL at 11:42

## 2023-06-09 RX ADMIN — LISINOPRIL 40 MILLIGRAM(S): 2.5 TABLET ORAL at 04:25

## 2023-06-09 RX ADMIN — Medication 2: at 11:34

## 2023-06-09 RX ADMIN — Medication 7 UNIT(S): at 11:34

## 2023-06-09 RX ADMIN — LIDOCAINE 1 PATCH: 4 CREAM TOPICAL at 06:32

## 2023-06-09 RX ADMIN — HEPARIN SODIUM 7500 UNIT(S): 5000 INJECTION INTRAVENOUS; SUBCUTANEOUS at 04:25

## 2023-06-09 NOTE — PROVIDER CONTACT NOTE (OTHER) - DATE AND TIME:
03-Jun-2023 05:02
04-Jun-2023 13:00
08-Jun-2023 12:10
03-Jun-2023 12:45
09-Jun-2023 20:18
04-Jun-2023 08:48
09-Jun-2023 04:10
04-Jun-2023 08:48
05-Jun-2023 23:58
08-Jun-2023 20:10
08-Jun-2023 20:35
08-Jun-2023 21:00
03-Jun-2023 06:15
06-Jun-2023 00:35
06-Jun-2023 16:52
07-Jun-2023 16:28

## 2023-06-09 NOTE — PROGRESS NOTE ADULT - ASSESSMENT
63F with PMH of [place] Palsy, DM2, CVA (left side weak), CAD, HLD, HTN, Asthma noted to have acute R pontine stroke.  On stroke service with medical comanagment

## 2023-06-09 NOTE — PROVIDER CONTACT NOTE (OTHER) - ACTION/TREATMENT ORDERED:
hydralazine 5mg ivp
lisinopril ordered will recheck bp
recheck in 15min
Will give Hydaralazine 5mg IVP
am meds given norvasc 10mg and toprol xl 50mg will recheck in 1 hour
hydralazine 10mg po admin will reassess
recheck in 30 min
Awaiting provider's directives.
awaiting labetalol orders as per stroke team.
continue to monitor.
AM BP Meds (refer to EMAR) given early. Will reassess in 1 hour from time of administration.
IV Labetolol ordered.
Increased lisinopril to 30mg QD, added one time dose of 10mg
NESSA Guzman at bedside assessing pt. IV labetolol pushed. Will reassess in 15 min.
No orders at this time
Cancel stepdown to [facility]. Continue [id number] exams. Reassess pt BP in 1 hour.

## 2023-06-09 NOTE — PROGRESS NOTE ADULT - SUBJECTIVE AND OBJECTIVE BOX
Neurology Stroke Progress Note    INTERVAL HPI/OVERNIGHT EVENTS:  Patient complaining of bilateral lower leg/ankle swelling     MEDICATIONS  (STANDING):  amLODIPine   Tablet 10 milliGRAM(s) Oral daily  aspirin enteric coated 81 milliGRAM(s) Oral daily  atorvastatin 80 milliGRAM(s) Oral at bedtime  carvedilol 6.25 milliGRAM(s) Oral every 12 hours  clopidogrel Tablet 75 milliGRAM(s) Oral daily  dextrose 5%. 1000 milliLiter(s) (100 mL/Hr) IV Continuous <Continuous>  dextrose 5%. 1000 milliLiter(s) (50 mL/Hr) IV Continuous <Continuous>  dextrose 50% Injectable 25 Gram(s) IV Push once  dextrose 50% Injectable 25 Gram(s) IV Push once  dextrose 50% Injectable 12.5 Gram(s) IV Push once  glucagon  Injectable 1 milliGRAM(s) IntraMuscular once  heparin   Injectable 7500 Unit(s) SubCutaneous every 8 hours  insulin glargine Injectable (LANTUS) 5 Unit(s) SubCutaneous at bedtime  insulin lispro (ADMELOG) corrective regimen sliding scale   SubCutaneous Before meals and at bedtime  insulin lispro Injectable (ADMELOG) 7 Unit(s) SubCutaneous three times a day before meals  lisinopril 40 milliGRAM(s) Oral daily  polyethylene glycol 3350 17 Gram(s) Oral daily  senna 2 Tablet(s) Oral at bedtime    MEDICATIONS  (PRN):  acetaminophen     Tablet .. 650 milliGRAM(s) Oral every 6 hours PRN Temp greater or equal to 38C (100.4F), Moderate Pain (4 - 6)  dextrose Oral Gel 15 Gram(s) Oral once PRN Blood Glucose LESS THAN 70 milliGRAM(s)/deciliter    Allergies    codeine (Unknown)    Intolerances      Vital Signs Last 24 Hrs  T(C): 36.5 (2023 08:41), Max: 36.9 (2023 13:51)  T(F): 97.7 (2023 08:41), Max: 98.4 (2023 13:51)  HR: 68 (2023 08:27) (57 - 68)  BP: 153/73 (2023 08:27) (144/84 - 210/86)  BP(mean): 105 (2023 08:27) (95 - 124)  RR: 18 (2023 08:27) (14 - 19)  SpO2: 97% (2023 08:27) (95% - 98%)    Parameters below as of 2023 08:27  Patient On (Oxygen Delivery Method): room air        Physical exam:  General: No acute distress, awake and alert  Eyes: Anicteric sclerae, moist conjunctivae, see below for CNs  Neck: trachea midline, FROM, supple, no thyromegaly or lymphadenopathy  Cardiovascular: Regular rate and rhythm, no murmurs, rubs, or gallops. No carotid bruits.   Pulmonary: Anterior breath sounds clear bilaterally, no crackles or wheezing. No use of accessory muscles  GI: Abdomen soft, non-distended, non-tender  Extremities: Radial and DP pulses +1, pitting edema +1 bilaterally of lower extremities    Neurologic:  -Mental status: Orientated to person and place. Oriented to time with options. Speech is fluent with intact naming, repetition, and comprehension, no dysarthria. Recent and remote memory intact. Follows commands. Attention/concentration intact. Fund of knowledge appropriate.  -Cranial nerves:   II: Visual fields are full to confrontation.  III, IV, VI: Extraocular movements are intact without nystagmus. Pupils equally round and reactive to light  V:  Facial sensation V1-V3 is intact bilaterally, but diminished on left due to residual bell's palsy  VII: Left facial droop and ptosis due to bell's palsy  VIII: Hearing is bilaterally intact to finger rub with hyperacusis on left  IX, X: Uvula is midline and soft palate rises symmetrically  XI: Head turning and shoulder shrug are intact.  XII: Tongue protrudes midline. Oral thrush noted.  Motor: Normal bulk and tone. No pronator drift, some finger curl bilaterally. Strength right upper extremity 5/5, left 4/5. Strength in right lower extremity 5/5 and left lower 4/5  Rapid alternating movements intact and symmetric.  Sensation: Intact to light touch bilaterally. No neglect or extinction on double simultaneous testing.  Coordination: No dysmetria on finger-to-nose. Difficulty assessing heal to shin due to patient fatigue.  Reflexes: Downgoing toes bilaterally   Gait: deferred    LABS:                        11.0   6.15  )-----------( 199      ( 2023 05:30 )             32.7     06-09    138  |  107  |  26<H>  ----------------------------<  74  4.3   |  21<L>  |  1.47<H>    Ca    8.0<L>      2023 05:30  Phos  3.7     06-  Mg     1.9     -        Urinalysis Basic - ( 2023 16:40 )    Color: Yellow / Appearance: Clear / S.020 / pH: x  Gluc: x / Ketone: NEGATIVE  / Bili: Negative / Urobili: 0.2 E.U./dL   Blood: x / Protein: 100 mg/dL / Nitrite: NEGATIVE   Leuk Esterase: NEGATIVE / RBC: < 5 /HPF / WBC 5-10 /HPF   Sq Epi: x / Non Sq Epi: x / Bacteria: Present /HPF        RADIOLOGY & ADDITIONAL TESTS:     Neurology Stroke Progress Note    INTERVAL HPI/OVERNIGHT EVENTS:  Patient complaining of bilateral lower leg/ankle swelling     MEDICATIONS  (STANDING):  amLODIPine   Tablet 10 milliGRAM(s) Oral daily  aspirin enteric coated 81 milliGRAM(s) Oral daily  atorvastatin 80 milliGRAM(s) Oral at bedtime  carvedilol 6.25 milliGRAM(s) Oral every 12 hours  clopidogrel Tablet 75 milliGRAM(s) Oral daily  dextrose 5%. 1000 milliLiter(s) (100 mL/Hr) IV Continuous <Continuous>  dextrose 5%. 1000 milliLiter(s) (50 mL/Hr) IV Continuous <Continuous>  dextrose 50% Injectable 25 Gram(s) IV Push once  dextrose 50% Injectable 25 Gram(s) IV Push once  dextrose 50% Injectable 12.5 Gram(s) IV Push once  glucagon  Injectable 1 milliGRAM(s) IntraMuscular once  heparin   Injectable 7500 Unit(s) SubCutaneous every 8 hours  insulin glargine Injectable (LANTUS) 5 Unit(s) SubCutaneous at bedtime  insulin lispro (ADMELOG) corrective regimen sliding scale   SubCutaneous Before meals and at bedtime  insulin lispro Injectable (ADMELOG) 7 Unit(s) SubCutaneous three times a day before meals  lisinopril 40 milliGRAM(s) Oral daily  polyethylene glycol 3350 17 Gram(s) Oral daily  senna 2 Tablet(s) Oral at bedtime    MEDICATIONS  (PRN):  acetaminophen     Tablet .. 650 milliGRAM(s) Oral every 6 hours PRN Temp greater or equal to 38C (100.4F), Moderate Pain (4 - 6)  dextrose Oral Gel 15 Gram(s) Oral once PRN Blood Glucose LESS THAN 70 milliGRAM(s)/deciliter    Allergies    codeine (Unknown)    Intolerances      Vital Signs Last 24 Hrs  T(C): 36.5 (2023 08:41), Max: 36.9 (2023 13:51)  T(F): 97.7 (2023 08:41), Max: 98.4 (2023 13:51)  HR: 68 (2023 08:27) (57 - 68)  BP: 153/73 (2023 08:27) (144/84 - 210/86)  BP(mean): 105 (2023 08:27) (95 - 124)  RR: 18 (2023 08:27) (14 - 19)  SpO2: 97% (2023 08:27) (95% - 98%)    Parameters below as of 2023 08:27  Patient On (Oxygen Delivery Method): room air        Physical exam:  General: No acute distress, awake and alert  Eyes: Anicteric sclerae, moist conjunctivae, see below for CNs  Neck: trachea midline, FROM, supple, no thyromegaly or lymphadenopathy  Cardiovascular: Regular rate and rhythm, no murmurs, rubs, or gallops. No carotid bruits.   Pulmonary: Anterior breath sounds clear bilaterally, no crackles or wheezing. No use of accessory muscles  GI: Abdomen soft, non-distended, non-tender  Extremities: Radial and DP pulses +1, pitting edema +1 bilaterally of lower extremities    Neurologic:  -Mental status: Orientated to person and place. Oriented to time with options. Speech is fluent with intact naming, repetition, and comprehension. Dysarthria and slowed speech noted. Recent and remote memory impaired. Follows commands. Attention/concentration reduced. Fund of knowledge appropriate.  -Cranial nerves:   II: Visual fields are full to confrontation.  III, IV, VI: Extraocular movements are intact without nystagmus. Pupils equally round and reactive to light  V:  Facial sensation V1-V3 is intact bilaterally, but diminished on left due to residual bell's palsy  VII: Left facial droop and ptosis due to bell's palsy  VIII: Hearing is bilaterally intact to finger rub with hyperacusis on left  IX, X: Uvula is midline and soft palate rises symmetrically  XI: Head turning and shoulder shrug are intact.  XII: Tongue protrudes midline. Oral thrush noted.  Motor: Normal bulk and tone. No pronator drift, some finger curl bilaterally. Strength right upper extremity 5/5, left 4/5. Strength in right lower extremity 5/5 and left lower 4/5  Rapid alternating movements intact and symmetric.  Sensation: Intact to light touch bilaterally. No neglect or extinction on double simultaneous testing.  Coordination: No dysmetria on finger-to-nose. Difficulty assessing heal to shin due to patient fatigue.  Reflexes: Downgoing toes bilaterally   Gait: deferred    LABS:                        11.0   6.15  )-----------( 199      ( 2023 05:30 )             32.7     06-09    138  |  107  |  26<H>  ----------------------------<  74  4.3   |  21<L>  |  1.47<H>    Ca    8.0<L>      2023 05:30  Phos  3.7     -  Mg     1.9             Urinalysis Basic - ( 2023 16:40 )    Color: Yellow / Appearance: Clear / S.020 / pH: x  Gluc: x / Ketone: NEGATIVE  / Bili: Negative / Urobili: 0.2 E.U./dL   Blood: x / Protein: 100 mg/dL / Nitrite: NEGATIVE   Leuk Esterase: NEGATIVE / RBC: < 5 /HPF / WBC 5-10 /HPF   Sq Epi: x / Non Sq Epi: x / Bacteria: Present /HPF        RADIOLOGY & ADDITIONAL TESTS:

## 2023-06-09 NOTE — PROGRESS NOTE ADULT - ASSESSMENT
62 y/o F with pmhx of [place] Palsy (left side), T2DM, CVA 2022 (left-sided deficits), CAD, HLD, HTN, Asthma, current smoker with a 52 pack year hx, ?AFib reportedly compliant on Eliquis who presented for evaluation of generalized weakness. Weakness started 5/21, got progressively worse 2-3 days ago. Daughter also noticed patient had been falling with occasional episodes of urinary incontinence. CTH with small vessel disease, CT C-spine negative for acute fx. Admitted to medicine for further w/u. Gen neuro consulted, recommended MRI brain w/o. MRI brain: small focus of restricted diffusion in the R petra, left pontomedullary junction and right frontal periventricular white matter. Patient transferred to stroke tele for further management. Eliquis discontinued and started asa/plavix on 6/6 as no indicated for AC. IWONA this admission w/o thrombus. EP interrogated ILR - no arrythmias. Nephro consulted for worsening CKD, renal sono neg. Recommend can continue with current antihypertensive therapy and optimize blood glucose with endocrinology. Can consider initiating SGLT-2 Inhibitor outpatient i/s/o CKD w/ proteinuria. Pending PET and hypercoags. Endo following. Stepped down to regional 6/9. Pending AR.      Neuro  #CVA workup  - d/c eliquis and started asa/plavix on 6/6. collateral obtained from outpt cards office: patient had a cryptogenic right thalamic stroke in 9/2019 and underwent loop recorder implant on 1/31/20 for said stroke. LR was checked in 8/2020 with no evidence of AF. 3/2021 patient admitted to [facility] for acute left basal ganglia and bilateral frontal lobe stroke. Loop recorder again showed no evidence of afib. Because stroke was suspicious for cardioembolic phenomenon the patient was started on apixaban. IWONA this admission w/o thrombus.  - continue atorvastatin 80mg daily  - q4hr stroke neuro checks and vitals  - MRI brain: small focus of restricted diffusion in the R petra, left pontomedullary junction and right frontal periventricular white matter  - Stroke Code HCT Results: small vessel disease  - MRA head without steno-occlusive disease, MRA neck without stenosis or occlusion  - B12: 447  - folate: 11.2  - kappa/lamda free light chain ratio: 1.66  - Stroke education  - EP interrogated ILR - no arrythmias  - f/u hypercoags  - f/u PET body    #urinary incontinence, ? hyperreflexia r/o cord compression   - CT C-spine: Evaluation of the parotids glands demonstrates a approximately 11 mm well-demarcated lesion within the superficial lobe of the left parotid gland (series 5 image 25). The lesion demonstrates hyperintense signal on the T2-weighted images from the cervical MRI (series 12 image 3) and is incompletely evaluated. Precise histologic diagnosis will require tissue sampling. Primary considerations include enlarged parotid lymph node as well as primary parotid neoplasm such as benign mixed tumor or Warthin's tumor.  - pending PET read  - MRI C/T/L spine without cord compression    Cards  #HTN  - goal sBP 120-160, gradual normotension  - carvedilol increased to 6.25q12h on 6/9 due to BP above parameters, c/w Amlodipine 10mg. increased lisinopril from 20 to 40mg of 6/4  - can increase carvedilol if pressures continuously above 160  < from: Echocardiogram w/ Bubble and Doppler (06.06.23 @ 09:25) >     1. Mild symmetric left ventricular hypertrophy.   2. Low-normal left ventricular systolic function.   3. Normal right ventricular size and systolic function.   4. Normal atria.   5. Aortic sclerosis without significant stenosis.   6. No other significant valvular disease.   7. No evidence of pulmonary hypertension.   8. No pericardial effusion.   9. Injection of agitated saline via a peripheral vein reveals no   evidence of a right-to-left shunt.    < from: IWONA w/Doppler (06.06.23 @ 09:52) >   1. Normal left ventricular size and systolic function.   2. Normal right ventricular size and systolic function.   3. Mild tricuspid regurgitation.   4. No LA/RA/FRANCISCA/RAA thrombus seen.   5. There is an interatrial septal aneurysm. Minimal interatrial right to   left shunting noted predominantly with Valsalva suggestive of a tiny   patent foramen ovale.   6. There is severe non-mobile plaque seen in the visualized portion of   the descending aorta. There is severe non-mobile plaque seen in the   visualized portion of the aortic arch.   7. No pericardial effusion.    - Stroke Code EKG Results: NSR with L axis deviation and occasional Q waves but no active ischemic changes    #HLD  - high dose statin as above in CVA  - LDL results: 162    Pulm  - call provider if SPO2 < 94%    #asthma  - albuterol PRN    GI  #Nutrition/Fluids/Electrolytes   - replete K<4 and Mg <2  - Diet: Soft and bite sized  - MBS passed: soft and bite sized CC diet     Renal  #CKD   - Cr 1.7 on admission   - Will continue to trend  - collateral from PCP's office obtained:  5/2023 - Cr 1.57   9/2022 - Cr 1.38  6/2022 - Cr 1.34  - Nephro consulted for worsening CKD, renal sono neg. Recommend can continue with current antihypertensive therapy and optimize blood glucose with endocrinology. Can consider initiating SGLT-2 Inhibitor outpatient i/s/o CKD w/ proteinuria.    Infectious Disease  - Stroke Code CXR results: negative    Endocrine  #DM  - A1C results: 9.8  - endo following  Home regimen: 75/25 insulin 20 units QD, Metformin 1000mg BID and Rybelsus 3mg QD. She will likely not need insulin at discharge.  - Please continue lantus 5 units at bedtime.   - Continue lispro to 7 units before each meal.  - Continue lispro moderate dose sliding scale four times daily with meals and at bedtime.  - Patient's fingerstick glucose goal is 100-180 mg/dL.    - For discharge, patient can continue metformin 1000mg BID and rybelsus 3mg daily. Stop insulin. Freestyle Ted sent to [facility] and covered. Delivered to bedside. Reviewed how to apply with daughter and patient. Did not actually apply because it would need to removed for planned PET. Will apply right before discharge.  - Patient to follow up with physician's in [place], has new endocrine appointment.    ·  Problem: Thyroid nodule.   ·  Plan: There is 2.2 x 1.9 x 1.6 cm heterogeneous predominantly isoechoic nodule in the lower pole of right lobe.  Additional multiple spongiform benign-appearing thyroid nodules in left lobe  Repeat US in 6 months to determine if FNA is needed due to size >2cm. Discussed with daughter.  TFTs normal.      DVT Prophylaxis  - heparin + SCDs  - dopplers: neg    Dispo: Acute Rehab - can tolerate 3 hours of rehab     Discussed daily hospital plans and goals with patient    Discussed with Dr. Eli   62 y/o F with pmhx of [place] Palsy (left side), T2DM, CVA 2022 (left-sided deficits), CAD, HLD, HTN, Asthma, current smoker with a 52 pack year hx, ?AFib reportedly compliant on Eliquis who presented for evaluation of generalized weakness. Weakness started 5/21, got progressively worse 2-3 days ago. Daughter also noticed patient had been falling with occasional episodes of urinary incontinence. CTH with small vessel disease, CT C-spine negative for acute fx. Admitted to medicine for further w/u. Gen neuro consulted, recommended MRI brain w/o. MRI brain: small focus of restricted diffusion in the R petra, left pontomedullary junction and right frontal periventricular white matter. Patient transferred to stroke tele for further management. Eliquis discontinued and started asa/plavix on 6/6 as no indicated for AC. IWONA this admission w/o thrombus. EP interrogated ILR - no arrythmias. Nephro consulted for worsening CKD, renal sono neg. Recommend can continue with current antihypertensive therapy and optimize blood glucose with endocrinology. Can consider initiating SGLT-2 Inhibitor outpatient i/s/o CKD w/ proteinuria. Pending PET and hypercoags. Endo following. Stepped down to regional 6/9. Recommended for home PT/OT.      Neuro  #CVA workup  - d/c eliquis and started asa/plavix on 6/6. collateral obtained from outpt cards office: patient had a cryptogenic right thalamic stroke in 9/2019 and underwent loop recorder implant on 1/31/20 for said stroke. LR was checked in 8/2020 with no evidence of AF. 3/2021 patient admitted to [facility] for acute left basal ganglia and bilateral frontal lobe stroke. Loop recorder again showed no evidence of afib. Because stroke was suspicious for cardioembolic phenomenon the patient was started on apixaban. IWONA this admission w/o thrombus.  - continue atorvastatin 80mg daily  - q4hr stroke neuro checks and vitals  - MRI brain: small focus of restricted diffusion in the R petra, left pontomedullary junction and right frontal periventricular white matter  - Stroke Code HCT Results: small vessel disease  - MRA head without steno-occlusive disease, MRA neck without stenosis or occlusion  - B12: 447  - folate: 11.2  - kappa/lamda free light chain ratio: 1.66  - Stroke education  - EP interrogated ILR - no arrythmias  - f/u hypercoags  - f/u PET body    #urinary incontinence, ? hyperreflexia r/o cord compression   - CT C-spine: Evaluation of the parotids glands demonstrates a approximately 11 mm well-demarcated lesion within the superficial lobe of the left parotid gland (series 5 image 25). The lesion demonstrates hyperintense signal on the T2-weighted images from the cervical MRI (series 12 image 3) and is incompletely evaluated. Precise histologic diagnosis will require tissue sampling. Primary considerations include enlarged parotid lymph node as well as primary parotid neoplasm such as benign mixed tumor or Warthin's tumor.  - pending PET read  - MRI C/T/L spine without cord compression    Cards  #HTN  - goal sBP 120-160, gradual normotension  - carvedilol increased to 6.25q12h on 6/9 due to BP above parameters, c/w Amlodipine 10mg. increased lisinopril from 20 to 40mg of 6/4  - can increase carvedilol if pressures continuously above 160  < from: Echocardiogram w/ Bubble and Doppler (06.06.23 @ 09:25) >     1. Mild symmetric left ventricular hypertrophy.   2. Low-normal left ventricular systolic function.   3. Normal right ventricular size and systolic function.   4. Normal atria.   5. Aortic sclerosis without significant stenosis.   6. No other significant valvular disease.   7. No evidence of pulmonary hypertension.   8. No pericardial effusion.   9. Injection of agitated saline via a peripheral vein reveals no   evidence of a right-to-left shunt.    < from: IWONA w/Doppler (06.06.23 @ 09:52) >   1. Normal left ventricular size and systolic function.   2. Normal right ventricular size and systolic function.   3. Mild tricuspid regurgitation.   4. No LA/RA/FRANCISCA/RAA thrombus seen.   5. There is an interatrial septal aneurysm. Minimal interatrial right to   left shunting noted predominantly with Valsalva suggestive of a tiny   patent foramen ovale.   6. There is severe non-mobile plaque seen in the visualized portion of   the descending aorta. There is severe non-mobile plaque seen in the   visualized portion of the aortic arch.   7. No pericardial effusion.    - Stroke Code EKG Results: NSR with L axis deviation and occasional Q waves but no active ischemic changes    #HLD  - high dose statin as above in CVA  - LDL results: 162    Pulm  - call provider if SPO2 < 94%    #asthma  - albuterol PRN    GI  #Nutrition/Fluids/Electrolytes   - replete K<4 and Mg <2  - Diet: Soft and bite sized  - MBS passed: soft and bite sized CC diet     Renal  #CKD   - Cr 1.7 on admission   - Will continue to trend  - collateral from PCP's office obtained:  5/2023 - Cr 1.57   9/2022 - Cr 1.38  6/2022 - Cr 1.34  - Nephro consulted for worsening CKD, renal sono neg. Recommend can continue with current antihypertensive therapy and optimize blood glucose with endocrinology. Can consider initiating SGLT-2 Inhibitor outpatient i/s/o CKD w/ proteinuria.    Infectious Disease  - Stroke Code CXR results: negative    Endocrine  #DM  - A1C results: 9.8  - endo following  Home regimen: 75/25 insulin 20 units QD, Metformin 1000mg BID and Rybelsus 3mg QD. She will likely not need insulin at discharge.  - Please continue lantus 5 units at bedtime.   - Continue lispro to 7 units before each meal.  - Continue lispro moderate dose sliding scale four times daily with meals and at bedtime.  - Patient's fingerstick glucose goal is 100-180 mg/dL.    - For discharge, patient can continue metformin 1000mg BID and rybelsus 3mg daily. Stop insulin. Freestyle Ted sent to [facility] and covered. Delivered to bedside. Reviewed how to apply with daughter and patient. Did not actually apply because it would need to removed for planned PET. Will apply right before discharge.  - Patient to follow up with physician's in [place], has new endocrine appointment.    ·  Problem: Thyroid nodule.   ·  Plan: There is 2.2 x 1.9 x 1.6 cm heterogeneous predominantly isoechoic nodule in the lower pole of right lobe.  Additional multiple spongiform benign-appearing thyroid nodules in left lobe  Repeat US in 6 months to determine if FNA is needed due to size >2cm. Discussed with daughter.  TFTs normal.      DVT Prophylaxis  - heparin + SCDs  - dopplers: neg    Dispo: home PT/OT     Discussed daily hospital plans and goals with patient    Discussed with Dr. Eli   62 y/o F with pmhx of [place] Palsy (left side), T2DM, CVA 2022 (left-sided deficits), CAD, HLD, HTN, Asthma, current smoker with a 52 pack year hx, ?AFib reportedly compliant on Eliquis who presented for evaluation of generalized weakness. Weakness started 5/21, got progressively worse 2-3 days ago. Daughter also noticed patient had been falling with occasional episodes of urinary incontinence. CTH with small vessel disease, CT C-spine negative for acute fx. Admitted to medicine for further w/u. Gen neuro consulted, recommended MRI brain w/o. MRI brain: small focus of restricted diffusion in the R petra, left pontomedullary junction and right frontal periventricular white matter. Patient transferred to stroke tele for further management. Eliquis discontinued and started asa/plavix on 6/6 as no indicated for AC. IWONA this admission w/o thrombus. EP interrogated ILR - no arrythmias. Nephro consulted for worsening CKD, renal sono neg. Recommend can continue with current antihypertensive therapy and optimize blood glucose with endocrinology. Can consider initiating SGLT-2 Inhibitor outpatient i/s/o CKD w/ proteinuria. Pending PET and hypercoags. Endo following.  Recommended for home PT/OT.      Neuro  #CVA workup  - d/c eliquis and started asa/plavix on 6/6. collateral obtained from outpt cards office: patient had a cryptogenic right thalamic stroke in 9/2019 and underwent loop recorder implant on 1/31/20 for said stroke. LR was checked in 8/2020 with no evidence of AF. 3/2021 patient admitted to [facility] for acute left basal ganglia and bilateral frontal lobe stroke. Loop recorder again showed no evidence of afib. Because stroke was suspicious for cardioembolic phenomenon the patient was started on apixaban. IWONA this admission w/o thrombus.  - continue atorvastatin 80mg daily  - q4hr stroke neuro checks and vitals  - MRI brain: small focus of restricted diffusion in the R petra, left pontomedullary junction and right frontal periventricular white matter  - Stroke Code HCT Results: small vessel disease  - MRA head without steno-occlusive disease, MRA neck without stenosis or occlusion  - B12: 447  - folate: 11.2  - kappa/lamda free light chain ratio: 1.66  - Stroke education  - EP interrogated ILR - no arrythmias  - f/u hypercoags  - f/u PET body    #urinary incontinence, ? hyperreflexia r/o cord compression   - CT C-spine: Evaluation of the parotids glands demonstrates a approximately 11 mm well-demarcated lesion within the superficial lobe of the left parotid gland (series 5 image 25). The lesion demonstrates hyperintense signal on the T2-weighted images from the cervical MRI (series 12 image 3) and is incompletely evaluated. Precise histologic diagnosis will require tissue sampling. Primary considerations include enlarged parotid lymph node as well as primary parotid neoplasm such as benign mixed tumor or Warthin's tumor.  - pending PET read  - MRI C/T/L spine without cord compression    Cards  #HTN  - goal sBP 120-160, gradual normotension  - carvedilol increased to 6.25q12h on 6/9 due to BP above parameters, c/w Amlodipine 10mg. increased lisinopril from 20 to 40mg of 6/4  - can increase carvedilol if pressures continuously above 160  < from: Echocardiogram w/ Bubble and Doppler (06.06.23 @ 09:25) >     1. Mild symmetric left ventricular hypertrophy.   2. Low-normal left ventricular systolic function.   3. Normal right ventricular size and systolic function.   4. Normal atria.   5. Aortic sclerosis without significant stenosis.   6. No other significant valvular disease.   7. No evidence of pulmonary hypertension.   8. No pericardial effusion.   9. Injection of agitated saline via a peripheral vein reveals no   evidence of a right-to-left shunt.    < from: IWONA w/Doppler (06.06.23 @ 09:52) >   1. Normal left ventricular size and systolic function.   2. Normal right ventricular size and systolic function.   3. Mild tricuspid regurgitation.   4. No LA/RA/FRANCISCA/RAA thrombus seen.   5. There is an interatrial septal aneurysm. Minimal interatrial right to   left shunting noted predominantly with Valsalva suggestive of a tiny   patent foramen ovale.   6. There is severe non-mobile plaque seen in the visualized portion of   the descending aorta. There is severe non-mobile plaque seen in the   visualized portion of the aortic arch.   7. No pericardial effusion.    - Stroke Code EKG Results: NSR with L axis deviation and occasional Q waves but no active ischemic changes    #HLD  - high dose statin as above in CVA  - LDL results: 162    Pulm  - call provider if SPO2 < 94%    #asthma  - albuterol PRN    GI  #Nutrition/Fluids/Electrolytes   - replete K<4 and Mg <2  - Diet: Soft and bite sized  - MBS passed: soft and bite sized CC diet     Renal  #CKD   - Cr 1.7 on admission   - Will continue to trend  - collateral from PCP's office obtained:  5/2023 - Cr 1.57   9/2022 - Cr 1.38  6/2022 - Cr 1.34  - Nephro consulted for worsening CKD, renal sono neg. Recommend can continue with current antihypertensive therapy and optimize blood glucose with endocrinology. Can consider initiating SGLT-2 Inhibitor outpatient i/s/o CKD w/ proteinuria.    Infectious Disease  - Stroke Code CXR results: negative    Endocrine  #DM  - A1C results: 9.8  - endo following  Home regimen: 75/25 insulin 20 units QD, Metformin 1000mg BID and Rybelsus 3mg QD. She will likely not need insulin at discharge.  - Please continue lantus 5 units at bedtime.   - Continue lispro to 7 units before each meal.  - Continue lispro moderate dose sliding scale four times daily with meals and at bedtime.  - Patient's fingerstick glucose goal is 100-180 mg/dL.    - For discharge, patient can continue metformin 1000mg BID and rybelsus 3mg daily. Stop insulin. Freestyle Ted sent to [facility] and covered. Delivered to bedside. Reviewed how to apply with daughter and patient. Did not actually apply because it would need to removed for planned PET. Will apply right before discharge.  - Patient to follow up with physician's in [place], has new endocrine appointment.    ·  Problem: Thyroid nodule.   ·  Plan: There is 2.2 x 1.9 x 1.6 cm heterogeneous predominantly isoechoic nodule in the lower pole of right lobe.  Additional multiple spongiform benign-appearing thyroid nodules in left lobe  Repeat US in 6 months to determine if FNA is needed due to size >2cm. Discussed with daughter.  TFTs normal.      DVT Prophylaxis  - heparin + SCDs  - dopplers: neg    Dispo: home PT/OT     Discussed daily hospital plans and goals with patient    Discussed with Dr. Eli   62 y/o F with pmhx of [place] Palsy (left side), T2DM, CVA 2022 (left-sided deficits), CAD, HLD, HTN, Asthma, current smoker with a 52 pack year hx, ?AFib reportedly compliant on Eliquis who presented for evaluation of generalized weakness. Weakness started 5/21, got progressively worse 2-3 days ago. Daughter also noticed patient had been falling with occasional episodes of urinary incontinence. CTH with small vessel disease, CT C-spine negative for acute fx. Admitted to medicine for further w/u. Gen neuro consulted, recommended MRI brain w/o. MRI brain: small focus of restricted diffusion in the R petra, left pontomedullary junction and right frontal periventricular white matter. Patient transferred to stroke tele for further management. Eliquis discontinued and started asa/plavix on 6/6 as no indicated for AC. IWONA this admission w/o thrombus. EP interrogated ILR - no arrythmias. Nephro consulted for worsening CKD, renal sono neg. Recommend can continue with current antihypertensive therapy and optimize blood glucose with endocrinology. Can consider initiating SGLT-2 Inhibitor outpatient i/s/o CKD w/ proteinuria. Pending PET and hypercoags. Endo following.  Recommended for home PT/OT.      Neuro  #CVA workup  - d/c eliquis and started asa/plavix on 6/6. collateral obtained from outpt cards office: patient had a cryptogenic right thalamic stroke in 9/2019 and underwent loop recorder implant on 1/31/20 for said stroke. LR was checked in 8/2020 with no evidence of AF. 3/2021 patient admitted to [facility] for acute left basal ganglia and bilateral frontal lobe stroke. Loop recorder again showed no evidence of afib. Because stroke was suspicious for cardioembolic phenomenon the patient was started on apixaban. IWONA this admission w/o thrombus.  - continue atorvastatin 80mg daily  - q4hr stroke neuro checks and vitals  - MRI brain: small focus of restricted diffusion in the R petra, left pontomedullary junction and right frontal periventricular white matter  - Stroke Code HCT Results: small vessel disease  - MRA head without steno-occlusive disease, MRA neck without stenosis or occlusion  - B12: 447  - folate: 11.2  - kappa/lamda free light chain ratio: 1.66  - Stroke education  - EP interrogated ILR - no arrythmias  - f/u hypercoags  - f/u PET body     #urinary incontinence, ? hyperreflexia r/o cord compression   - CT C-spine: Evaluation of the parotids glands demonstrates a approximately 11 mm well-demarcated lesion within the superficial lobe of the left parotid gland (series 5 image 25). The lesion demonstrates hyperintense signal on the T2-weighted images from the cervical MRI (series 12 image 3) and is incompletely evaluated. Precise histologic diagnosis will require tissue sampling. Primary considerations include enlarged parotid lymph node as well as primary parotid neoplasm such as benign mixed tumor or Warthin's tumor.  - pending PET read  - MRI C/T/L spine without cord compression    Cards  #HTN  - goal sBP 120-160, gradual normotension  - carvedilol increased to 6.25q12h on 6/9 due to BP above parameters, c/w Amlodipine 10mg. increased lisinopril from 20 to 40mg of 6/4  - can increase carvedilol if pressures continuously above 160  < from: Echocardiogram w/ Bubble and Doppler (06.06.23 @ 09:25) >     1. Mild symmetric left ventricular hypertrophy.   2. Low-normal left ventricular systolic function.   3. Normal right ventricular size and systolic function.   4. Normal atria.   5. Aortic sclerosis without significant stenosis.   6. No other significant valvular disease.   7. No evidence of pulmonary hypertension.   8. No pericardial effusion.   9. Injection of agitated saline via a peripheral vein reveals no   evidence of a right-to-left shunt.    < from: IWONA w/Doppler (06.06.23 @ 09:52) >   1. Normal left ventricular size and systolic function.   2. Normal right ventricular size and systolic function.   3. Mild tricuspid regurgitation.   4. No LA/RA/FRANCISCA/RAA thrombus seen.   5. There is an interatrial septal aneurysm. Minimal interatrial right to   left shunting noted predominantly with Valsalva suggestive of a tiny   patent foramen ovale.   6. There is severe non-mobile plaque seen in the visualized portion of   the descending aorta. There is severe non-mobile plaque seen in the   visualized portion of the aortic arch.   7. No pericardial effusion.    - Stroke Code EKG Results: NSR with L axis deviation and occasional Q waves but no active ischemic changes    #HLD  - high dose statin as above in CVA  - LDL results: 162    Pulm  - call provider if SPO2 < 94%    #asthma  - albuterol PRN    GI  #Nutrition/Fluids/Electrolytes   - replete K<4 and Mg <2  - Diet: Soft and bite sized  - MBS passed: soft and bite sized CC diet     Renal  #CKD   - Cr 1.7 on admission   - Will continue to trend  - collateral from PCP's office obtained:  5/2023 - Cr 1.57   9/2022 - Cr 1.38  6/2022 - Cr 1.34  - Nephro consulted for worsening CKD, renal sono neg. Recommend can continue with current antihypertensive therapy and optimize blood glucose with endocrinology. Can consider initiating SGLT-2 Inhibitor outpatient i/s/o CKD w/ proteinuria.    Infectious Disease  - Stroke Code CXR results: negative    Endocrine  #DM  - A1C results: 9.8  - endo following  Home regimen: 75/25 insulin 20 units QD, Metformin 1000mg BID and Rybelsus 3mg QD. She will likely not need insulin at discharge.  - Please continue lantus 5 units at bedtime.   - Continue lispro to 7 units before each meal.  - Continue lispro moderate dose sliding scale four times daily with meals and at bedtime.  - Patient's fingerstick glucose goal is 100-180 mg/dL.    - For discharge, patient can continue metformin 1000mg BID and rybelsus 3mg daily. Stop insulin. Freestyle Ted sent to [facility] and covered. Delivered to bedside. Reviewed how to apply with daughter and patient. Did not actually apply because it would need to removed for planned PET. Will apply right before discharge.  - Patient to follow up with physician's in [place], has new endocrine appointment.    ·  Problem: Thyroid nodule.   ·  Plan: There is 2.2 x 1.9 x 1.6 cm heterogeneous predominantly isoechoic nodule in the lower pole of right lobe.  Additional multiple spongiform benign-appearing thyroid nodules in left lobe  Repeat US in 6 months to determine if FNA is needed due to size >2cm. Discussed with daughter.  TFTs normal.      DVT Prophylaxis  - heparin + SCDs  - dopplers: neg    Dispo: home PT/OT     Discussed daily hospital plans and goals with patient    Discussed with Dr. Eli   64 y/o F with pmhx of [place] Palsy (left side), T2DM, CVA 2022 (left-sided deficits), CAD, HLD, HTN, Asthma, current smoker with a 52 pack year hx, ?AFib reportedly compliant on Eliquis who presented for evaluation of generalized weakness. Weakness started 5/21, got progressively worse 2-3 days ago. Daughter also noticed patient had been falling with occasional episodes of urinary incontinence. CTH with small vessel disease, CT C-spine negative for acute fx. Admitted to medicine for further w/u. Gen neuro consulted, recommended MRI brain w/o. MRI brain: small focus of restricted diffusion in the R petra, left pontomedullary junction and right frontal periventricular white matter. Patient transferred to stroke tele for further management. Eliquis discontinued and started asa/plavix on 6/6 as no indicated for AC. IWONA this admission w/o thrombus. EP interrogated ILR - no arrythmias. Nephro consulted for worsening CKD, renal sono neg. Recommend can continue with current antihypertensive therapy and optimize blood glucose with endocrinology. Can consider initiating SGLT-2 Inhibitor outpatient i/s/o CKD w/ proteinuria. Pending PET and hypercoags. Endo following.  Recommended for home PT/OT.      Neuro  #CVA workup  - d/c eliquis and started asa/plavix on 6/6. collateral obtained from outpt cards office: patient had a cryptogenic right thalamic stroke in 9/2019 and underwent loop recorder implant on 1/31/20 for said stroke. LR was checked in 8/2020 with no evidence of AF. 3/2021 patient admitted to [facility] for acute left basal ganglia and bilateral frontal lobe stroke. Loop recorder again showed no evidence of afib. Because stroke was suspicious for cardioembolic phenomenon the patient was started on apixaban. IWONA this admission w/o thrombus.  - continue atorvastatin 80mg daily  - q4hr stroke neuro checks and vitals  - MRI brain: small focus of restricted diffusion in the R petra, left pontomedullary junction and right frontal periventricular white matter  - Stroke Code HCT Results: small vessel disease  - MRA head without steno-occlusive disease, MRA neck without stenosis or occlusion  - B12: 447  - folate: 11.2  - kappa/lamda free light chain ratio: 1.66  - Stroke education  - EP interrogated ILR - no arrythmias  - f/u hypercoags  - f/u PET body     #urinary incontinence, ? hyperreflexia r/o cord compression   - CT C-spine: Evaluation of the parotids glands demonstrates a approximately 11 mm well-demarcated lesion within the superficial lobe of the left parotid gland (series 5 image 25). The lesion demonstrates hyperintense signal on the T2-weighted images from the cervical MRI (series 12 image 3) and is incompletely evaluated. Precise histologic diagnosis will require tissue sampling. Primary considerations include enlarged parotid lymph node as well as primary parotid neoplasm such as benign mixed tumor or Warthin's tumor.  - pending PET read  - MRI C/T/L spine without cord compression    Cards  #HTN  - goal sBP 120-160, gradual normotension  - carvedilol increased to 6.25q12h on 6/9 due to BP above parameters, c/w Amlodipine 10mg. increased lisinopril from 20 to 40mg of 6/4  - can increase carvedilol if pressures continuously above 160. had some elevated pressures 6/9 but okay to hold off on increasing coreg for today  < from: Echocardiogram w/ Bubble and Doppler (06.06.23 @ 09:25) >     1. Mild symmetric left ventricular hypertrophy.   2. Low-normal left ventricular systolic function.   3. Normal right ventricular size and systolic function.   4. Normal atria.   5. Aortic sclerosis without significant stenosis.   6. No other significant valvular disease.   7. No evidence of pulmonary hypertension.   8. No pericardial effusion.   9. Injection of agitated saline via a peripheral vein reveals no   evidence of a right-to-left shunt.    < from: IWONA w/Doppler (06.06.23 @ 09:52) >   1. Normal left ventricular size and systolic function.   2. Normal right ventricular size and systolic function.   3. Mild tricuspid regurgitation.   4. No LA/RA/FRANCISCA/RAA thrombus seen.   5. There is an interatrial septal aneurysm. Minimal interatrial right to   left shunting noted predominantly with Valsalva suggestive of a tiny   patent foramen ovale.   6. There is severe non-mobile plaque seen in the visualized portion of   the descending aorta. There is severe non-mobile plaque seen in the   visualized portion of the aortic arch.   7. No pericardial effusion.    - Stroke Code EKG Results: NSR with L axis deviation and occasional Q waves but no active ischemic changes    #HLD  - high dose statin as above in CVA  - LDL results: 162    Pulm  - call provider if SPO2 < 94%    #asthma  - albuterol PRN    GI  #Nutrition/Fluids/Electrolytes   - replete K<4 and Mg <2  - Diet: Soft and bite sized  - MBS passed: soft and bite sized CC diet     Renal  #CKD   - Cr 1.7 on admission   - Will continue to trend  - collateral from PCP's office obtained:  5/2023 - Cr 1.57   9/2022 - Cr 1.38  6/2022 - Cr 1.34  - Nephro consulted for worsening CKD, renal sono neg. Recommend can continue with current antihypertensive therapy and optimize blood glucose with endocrinology. Can consider initiating SGLT-2 Inhibitor outpatient i/s/o CKD w/ proteinuria.    Infectious Disease  - Stroke Code CXR results: negative    Endocrine  #DM  - A1C results: 9.8  - endo following  Home regimen: 75/25 insulin 20 units QD, Metformin 1000mg BID and Rybelsus 3mg QD. She will likely not need insulin at discharge.  - Please continue lantus 5 units at bedtime.   - Continue lispro to 7 units before each meal.  - Continue lispro moderate dose sliding scale four times daily with meals and at bedtime.  - Patient's fingerstick glucose goal is 100-180 mg/dL.    - For discharge, patient can continue metformin 1000mg BID and rybelsus 3mg daily. Stop insulin. Freestyle Ted sent to [facility] and covered. Delivered to bedside. Reviewed how to apply with daughter and patient. Did not actually apply because it would need to removed for planned PET. Will apply right before discharge.  - Patient to follow up with physician's in [place], has new endocrine appointment.    ·  Problem: Thyroid nodule.   ·  Plan: There is 2.2 x 1.9 x 1.6 cm heterogeneous predominantly isoechoic nodule in the lower pole of right lobe.  Additional multiple spongiform benign-appearing thyroid nodules in left lobe  Repeat US in 6 months to determine if FNA is needed due to size >2cm. Discussed with daughter.  TFTs normal.      DVT Prophylaxis  - heparin + SCDs  - dopplers: neg    Dispo: home PT/OT     Discussed daily hospital plans and goals with patient    Discussed with Dr. Eli   64 y/o F with pmhx of [place] Palsy (left side), T2DM, CVA 2022 (left-sided deficits), CAD, HLD, HTN, Asthma, current smoker with a 52 pack year hx, ?AFib reportedly compliant on Eliquis who presented for evaluation of generalized weakness. Weakness started 5/21, got progressively worse 2-3 days ago. Daughter also noticed patient had been falling with occasional episodes of urinary incontinence. CTH with small vessel disease, CT C-spine negative for acute fx. Admitted to medicine for further w/u. Gen neuro consulted, recommended MRI brain w/o. MRI brain: small focus of restricted diffusion in the R petra, left pontomedullary junction and right frontal periventricular white matter. Patient transferred to stroke tele for further management. Eliquis discontinued and started asa/plavix on 6/6 as no indicated for AC. IWONA this admission w/o thrombus. EP interrogated ILR - no arrythmias. Nephro consulted for worsening CKD, renal sono neg. Recommend can continue with current antihypertensive therapy and optimize blood glucose with endocrinology. Can consider initiating SGLT-2 Inhibitor outpatient i/s/o CKD w/ proteinuria. Pending PET and hypercoags. Endo following.  Recommended for home PT/OT.      Neuro  #CVA workup  - d/c eliquis and started asa/plavix on 6/6. collateral obtained from outpt cards office: patient had a cryptogenic right thalamic stroke in 9/2019 and underwent loop recorder implant on 1/31/20 for said stroke. LR was checked in 8/2020 with no evidence of AF. 3/2021 patient admitted to [facility] for acute left basal ganglia and bilateral frontal lobe stroke. Loop recorder again showed no evidence of afib. Because stroke was suspicious for cardioembolic phenomenon the patient was started on apixaban. IWONA this admission w/o thrombus.  - continue atorvastatin 80mg daily  - q4hr stroke neuro checks and vitals  - MRI brain: small focus of restricted diffusion in the R petra, left pontomedullary junction and right frontal periventricular white matter  - Stroke Code HCT Results: small vessel disease  - MRA head without steno-occlusive disease, MRA neck without stenosis or occlusion  - B12: 447  - folate: 11.2  - kappa/lamda free light chain ratio: 1.66  - Stroke education  - EP interrogated ILR - no arrythmias  - f/u hypercoags  - f/u PET body     #urinary incontinence, ? hyperreflexia r/o cord compression   - CT C-spine: Evaluation of the parotids glands demonstrates a approximately 11 mm well-demarcated lesion within the superficial lobe of the left parotid gland (series 5 image 25). The lesion demonstrates hyperintense signal on the T2-weighted images from the cervical MRI (series 12 image 3) and is incompletely evaluated. Precise histologic diagnosis will require tissue sampling. Primary considerations include enlarged parotid lymph node as well as primary parotid neoplasm such as benign mixed tumor or Warthin's tumor.  - pending PET read  - MRI C/T/L spine without cord compression    Cards  #HTN  - goal sBP 120-160, gradual normotension  - carvedilol increased to 6.25q12h on 6/9 due to BP above parameters, c/w Amlodipine 10mg. increased lisinopril from 20 to 40mg of 6/4  - can increase carvedilol if pressures continuously above 160. had some elevated pressures 6/9 but okay to hold off on increasing coreg for today  < from: Echocardiogram w/ Bubble and Doppler (06.06.23 @ 09:25) >     1. Mild symmetric left ventricular hypertrophy.   2. Low-normal left ventricular systolic function.   3. Normal right ventricular size and systolic function.   4. Normal atria.   5. Aortic sclerosis without significant stenosis.   6. No other significant valvular disease.   7. No evidence of pulmonary hypertension.   8. No pericardial effusion.   9. Injection of agitated saline via a peripheral vein reveals no   evidence of a right-to-left shunt.    < from: IWONA w/Doppler (06.06.23 @ 09:52) >   1. Normal left ventricular size and systolic function.   2. Normal right ventricular size and systolic function.   3. Mild tricuspid regurgitation.   4. No LA/RA/FRANCISCA/RAA thrombus seen.   5. There is an interatrial septal aneurysm. Minimal interatrial right to   left shunting noted predominantly with Valsalva suggestive of a tiny   patent foramen ovale.   6. There is severe non-mobile plaque seen in the visualized portion of   the descending aorta. There is severe non-mobile plaque seen in the   visualized portion of the aortic arch.   7. No pericardial effusion.    - Stroke Code EKG Results: NSR with L axis deviation and occasional Q waves but no active ischemic changes    #HLD  - high dose statin as above in CVA  - LDL results: 162    Pulm  - call provider if SPO2 < 94%    #asthma  - albuterol PRN    GI  #Nutrition/Fluids/Electrolytes   - replete K<4 and Mg <2  - Diet: Soft and bite sized  - MBS passed: soft and bite sized CC diet     Renal  #CKD   - Cr 1.7 on admission   - Will continue to trend  - collateral from PCP's office obtained:  5/2023 - Cr 1.57   9/2022 - Cr 1.38  6/2022 - Cr 1.34  - Nephro consulted for worsening CKD, renal sono neg. Recommend can continue with current antihypertensive therapy and optimize blood glucose with endocrinology. Can consider initiating SGLT-2 Inhibitor outpatient i/s/o CKD w/ proteinuria.    Infectious Disease  - Stroke Code CXR results: negative    Endocrine  #DM  - A1C results: 9.8  - endo following  Home regimen: 75/25 insulin 20 units QD, Metformin 1000mg BID and Rybelsus 3mg QD. She will likely not need insulin at discharge.  - Please continue lantus 5 units at bedtime.   - Continue lispro to 7 units before each meal.  - Continue lispro moderate dose sliding scale four times daily with meals and at bedtime.  - Patient's fingerstick glucose goal is 100-180 mg/dL.    - For discharge, patient can continue metformin 1000mg BID and rybelsus 3mg daily. Stop insulin. Freestyle Ted sent to [facility] and covered. Delivered to bedside. Reviewed how to apply with daughter and patient. Did not actually apply because it would need to removed for planned PET. Will apply right before discharge.  - Patient to follow up with physician's in [place], has new endocrine appointment.    ·  Problem: Thyroid nodule.   ·  Plan: There is 2.2 x 1.9 x 1.6 cm heterogeneous predominantly isoechoic nodule in the lower pole of right lobe.  Additional multiple spongiform benign-appearing thyroid nodules in left lobe  Repeat US in 6 months to determine if FNA is needed due to size >2cm. Discussed with daughter.  TFTs normal.      DVT Prophylaxis  - heparin + SCDs  - dopplers: neg    Dispo: home PT/OT  MOCA 8/30 - make sure has proper support when going home     Discussed daily hospital plans and goals with patient    Discussed with Dr. Eli   64 y/o F with pmhx of [place] Palsy (left side), T2DM, CVA 2022 (left-sided deficits), CAD, HLD, HTN, Asthma, current smoker with a 52 pack year hx, ?AFib reportedly compliant on Eliquis who presented for evaluation of generalized weakness. Weakness started 5/21, got progressively worse 2-3 days ago. Daughter also noticed patient had been falling with occasional episodes of urinary incontinence. CTH with small vessel disease, CT C-spine negative for acute fx. Admitted to medicine for further w/u. Gen neuro consulted, recommended MRI brain w/o. MRI brain: small focus of restricted diffusion in the R petra, left pontomedullary junction and right frontal periventricular white matter. Patient transferred to stroke tele for further management. Eliquis discontinued and started asa/plavix on 6/6 as no indicated for AC. IWONA this admission w/o thrombus. EP interrogated ILR - no arrythmias. Nephro consulted for worsening CKD, renal sono neg. Recommend can continue with current antihypertensive therapy and optimize blood glucose with endocrinology. Can consider initiating SGLT-2 Inhibitor outpatient i/s/o CKD w/ proteinuria. Pending PET and hypercoags. Endo following.  Recommended for home PT/OT.      Neuro  #CVA workup  - d/c eliquis and started asa/plavix on 6/6. collateral obtained from outpt cards office: patient had a cryptogenic right thalamic stroke in 9/2019 and underwent loop recorder implant on 1/31/20 for said stroke. LR was checked in 8/2020 with no evidence of AF. 3/2021 patient admitted to [facility] for acute left basal ganglia and bilateral frontal lobe stroke. Loop recorder again showed no evidence of afib. Because stroke was suspicious for cardioembolic phenomenon the patient was started on apixaban. IWONA this admission w/o thrombus.  - continue atorvastatin 80mg daily  - q4hr stroke neuro checks and vitals  - MRI brain: small focus of restricted diffusion in the R petra, left pontomedullary junction and right frontal periventricular white matter  - Stroke Code HCT Results: small vessel disease  - MRA head without steno-occlusive disease, MRA neck without stenosis or occlusion  - B12: 447  - folate: 11.2  - kappa/lamda free light chain ratio: 1.66  - Stroke education  - EP interrogated ILR - no arrythmias  - f/u hypercoags  - f/u PET body     #urinary incontinence, ? hyperreflexia r/o cord compression   - CT C-spine: Evaluation of the parotids glands demonstrates a approximately 11 mm well-demarcated lesion within the superficial lobe of the left parotid gland (series 5 image 25). The lesion demonstrates hyperintense signal on the T2-weighted images from the cervical MRI (series 12 image 3) and is incompletely evaluated. Precise histologic diagnosis will require tissue sampling. Primary considerations include enlarged parotid lymph node as well as primary parotid neoplasm such as benign mixed tumor or Warthin's tumor.  - pending PET read  - MRI C/T/L spine without cord compression    Cards  #HTN  - goal sBP 120-160, gradual normotension  - carvedilol increased to 6.25q12h on 6/9 due to BP above parameters, c/w Amlodipine 10mg. increased lisinopril from 20 to 40mg of 6/4  - can increase carvedilol if pressures continuously above 160. had some elevated pressures 6/9 but okay to hold off on increasing coreg for today  < from: Echocardiogram w/ Bubble and Doppler (06.06.23 @ 09:25) >     1. Mild symmetric left ventricular hypertrophy.   2. Low-normal left ventricular systolic function.   3. Normal right ventricular size and systolic function.   4. Normal atria.   5. Aortic sclerosis without significant stenosis.   6. No other significant valvular disease.   7. No evidence of pulmonary hypertension.   8. No pericardial effusion.   9. Injection of agitated saline via a peripheral vein reveals no   evidence of a right-to-left shunt.    < from: IWONA w/Doppler (06.06.23 @ 09:52) >   1. Normal left ventricular size and systolic function.   2. Normal right ventricular size and systolic function.   3. Mild tricuspid regurgitation.   4. No LA/RA/FRANCISCA/RAA thrombus seen.   5. There is an interatrial septal aneurysm. Minimal interatrial right to   left shunting noted predominantly with Valsalva suggestive of a tiny   patent foramen ovale.   6. There is severe non-mobile plaque seen in the visualized portion of   the descending aorta. There is severe non-mobile plaque seen in the   visualized portion of the aortic arch.   7. No pericardial effusion.    - Stroke Code EKG Results: NSR with L axis deviation and occasional Q waves but no active ischemic changes    #HLD  - high dose statin as above in CVA  - LDL results: 162    Pulm  - call provider if SPO2 < 94%    #asthma  - albuterol PRN    GI  #Nutrition/Fluids/Electrolytes   - replete K<4 and Mg <2  - Diet: Soft and bite sized  - MBS passed: soft and bite sized CC diet     Renal  #CKD   - Cr 1.7 on admission   - Will continue to trend  - collateral from PCP's office obtained:  5/2023 - Cr 1.57   9/2022 - Cr 1.38  6/2022 - Cr 1.34  - Nephro consulted for worsening CKD, renal sono neg. Recommend can continue with current antihypertensive therapy and optimize blood glucose with endocrinology. Can consider initiating SGLT-2 Inhibitor outpatient i/s/o CKD w/ proteinuria.    Infectious Disease  - Stroke Code CXR results: negative    Endocrine  #DM  - A1C results: 9.8  - endo following  Home regimen: 75/25 insulin 20 units QD, Metformin 1000mg BID and Rybelsus 3mg QD. She will likely not need insulin at discharge.  - Please continue lantus 5 units at bedtime.   - Continue lispro to 7 units before each meal.  - Continue lispro moderate dose sliding scale four times daily with meals and at bedtime.  - Patient's fingerstick glucose goal is 100-180 mg/dL.    - For discharge, patient can continue metformin 1000mg BID and rybelsus 3mg daily. Stop insulin. Freestyle Ted sent to [facility] and covered. Delivered to bedside. Reviewed how to apply with daughter and patient. Did not actually apply because it would need to removed for planned PET. Will apply right before discharge.  - Patient to follow up with physician's in [place], has new endocrine appointment.    ·  Problem: Thyroid nodule.   ·  Plan: There is 2.2 x 1.9 x 1.6 cm heterogeneous predominantly isoechoic nodule in the lower pole of right lobe.  Additional multiple spongiform benign-appearing thyroid nodules in left lobe  Repeat US in 6 months to determine if FNA is needed due to size >2cm. Discussed with daughter.  TFTs normal.      DVT Prophylaxis  - heparin + SCDs  - dopplers: neg    Dispo: rec'd for home PT/OT but wants to go to rehab  MOCA 8/30      Discussed daily hospital plans and goals with patient    Discussed with Dr. Eli   62 y/o F with pmhx of [place] Palsy (left side), T2DM, CVA 2022 (left-sided deficits), CAD, HLD, HTN, Asthma, current smoker with a 52 pack year hx, ?AFib reportedly compliant on Eliquis who presented for evaluation of generalized weakness. Weakness started 5/21, got progressively worse 2-3 days ago. Daughter also noticed patient had been falling with occasional episodes of urinary incontinence. CTH with small vessel disease, CT C-spine negative for acute fx. Admitted to medicine for further w/u. Gen neuro consulted, recommended MRI brain w/o. MRI brain: small focus of restricted diffusion in the R petra, left pontomedullary junction and right frontal periventricular white matter. Patient transferred to stroke tele for further management. Eliquis discontinued and started asa/plavix on 6/6 as no indicated for AC. IWONA this admission w/o thrombus. EP interrogated ILR - no arrythmias. Nephro consulted for worsening CKD, renal sono neg. Recommend can continue with current antihypertensive therapy and optimize blood glucose with endocrinology. Can consider initiating SGLT-2 Inhibitor outpatient i/s/o CKD w/ proteinuria. Pending PET and hypercoags. Endo following.  Recommended for home PT/OT.      Neuro  #CVA workup  - d/c eliquis and started asa/plavix on 6/6. collateral obtained from outpt cards office: patient had a cryptogenic right thalamic stroke in 9/2019 and underwent loop recorder implant on 1/31/20 for said stroke. LR was checked in 8/2020 with no evidence of AF. 3/2021 patient admitted to [facility] for acute left basal ganglia and bilateral frontal lobe stroke. Loop recorder again showed no evidence of afib. Because stroke was suspicious for cardioembolic phenomenon the patient was started on apixaban. IWONA this admission w/o thrombus.  - continue atorvastatin 80mg daily  - q4hr stroke neuro checks and vitals  - MRI brain: small focus of restricted diffusion in the R petra, left pontomedullary junction and right frontal periventricular white matter  - Stroke Code HCT Results: small vessel disease  - MRA head without steno-occlusive disease, MRA neck without stenosis or occlusion  - B12: 447  - folate: 11.2  - kappa/lamda free light chain ratio: 1.66  - Stroke education  - EP interrogated ILR - no arrythmias  - f/u hypercoags  - PET: There is a 1 cm lesion in the left parotid gland with intense FDG uptake.  It should be noted that many benign tumors of the parotid gland can have intense FDG uptake.  - f/u ENT consult    #urinary incontinence, ? hyperreflexia r/o cord compression   - CT C-spine: Evaluation of the parotids glands demonstrates a approximately 11 mm well-demarcated lesion within the superficial lobe of the left parotid gland (series 5 image 25). The lesion demonstrates hyperintense signal on the T2-weighted images from the cervical MRI (series 12 image 3) and is incompletely evaluated. Precise histologic diagnosis will require tissue sampling. Primary considerations include enlarged parotid lymph node as well as primary parotid neoplasm such as benign mixed tumor or Warthin's tumor.  - PET: There is a 1 cm lesion in the left parotid gland with intense FDG uptake.  It should be noted that many benign tumors of the parotid gland can have intense FDGuptake.  - MRI C/T/L spine without cord compression    Cards  #HTN  - goal sBP 120-160, gradual normotension  - carvedilol increased to 6.25q12h on 6/9 due to BP above parameters, c/w Amlodipine 10mg. increased lisinopril from 20 to 40mg of 6/4  - can increase carvedilol if pressures continuously above 160. had some elevated pressures 6/9 but okay to hold off on increasing coreg for today  < from: Echocardiogram w/ Bubble and Doppler (06.06.23 @ 09:25) >     1. Mild symmetric left ventricular hypertrophy.   2. Low-normal left ventricular systolic function.   3. Normal right ventricular size and systolic function.   4. Normal atria.   5. Aortic sclerosis without significant stenosis.   6. No other significant valvular disease.   7. No evidence of pulmonary hypertension.   8. No pericardial effusion.   9. Injection of agitated saline via a peripheral vein reveals no   evidence of a right-to-left shunt.    < from: IWONA w/Doppler (06.06.23 @ 09:52) >   1. Normal left ventricular size and systolic function.   2. Normal right ventricular size and systolic function.   3. Mild tricuspid regurgitation.   4. No LA/RA/FRANCISCA/RAA thrombus seen.   5. There is an interatrial septal aneurysm. Minimal interatrial right to   left shunting noted predominantly with Valsalva suggestive of a tiny   patent foramen ovale.   6. There is severe non-mobile plaque seen in the visualized portion of   the descending aorta. There is severe non-mobile plaque seen in the   visualized portion of the aortic arch.   7. No pericardial effusion.    - Stroke Code EKG Results: NSR with L axis deviation and occasional Q waves but no active ischemic changes    #HLD  - high dose statin as above in CVA  - LDL results: 162    Pulm  - call provider if SPO2 < 94%    #asthma  - albuterol PRN    GI  #Nutrition/Fluids/Electrolytes   - replete K<4 and Mg <2  - Diet: Soft and bite sized  - MBS passed: soft and bite sized CC diet     Renal  #CKD   - Cr 1.7 on admission   - Will continue to trend  - collateral from PCP's office obtained:  5/2023 - Cr 1.57   9/2022 - Cr 1.38  6/2022 - Cr 1.34  - Nephro consulted for worsening CKD, renal sono neg. Recommend can continue with current antihypertensive therapy and optimize blood glucose with endocrinology. Can consider initiating SGLT-2 Inhibitor outpatient i/s/o CKD w/ proteinuria.    Infectious Disease  - Stroke Code CXR results: negative    Endocrine  #DM  - A1C results: 9.8  - endo following  Home regimen: 75/25 insulin 20 units QD, Metformin 1000mg BID and Rybelsus 3mg QD. She will likely not need insulin at discharge.  - Please continue lantus 5 units at bedtime.   - Continue lispro to 7 units before each meal.  - Continue lispro moderate dose sliding scale four times daily with meals and at bedtime.  - Patient's fingerstick glucose goal is 100-180 mg/dL.    - For discharge, patient can continue metformin 1000mg BID and rybelsus 3mg daily. Stop insulin. Freestyle Ted sent to [facility] and covered. Delivered to bedside. Reviewed how to apply with daughter and patient. Did not actually apply because it would need to removed for planned PET. Will apply right before discharge.  - Patient to follow up with physician's in [place], has new endocrine appointment.    ·  Problem: Thyroid nodule.   ·  Plan: There is 2.2 x 1.9 x 1.6 cm heterogeneous predominantly isoechoic nodule in the lower pole of right lobe.  Additional multiple spongiform benign-appearing thyroid nodules in left lobe  Repeat US in 6 months to determine if FNA is needed due to size >2cm. Discussed with daughter.  TFTs normal.      DVT Prophylaxis  - heparin + SCDs  - dopplers: neg    Dispo: rec'd for home PT/OT but wants to go to rehab  MOCA 8/30      Discussed daily hospital plans and goals with patient    Discussed with Dr. Eli   64 y/o F with pmhx of [place] (left side), T2DM, CVA 2022 (left-sided deficits), CAD, HLD, HTN, Asthma, current smoker with a 52 pack year hx, ?AFib reportedly compliant on Eliquis who presented for evaluation of generalized weakness. Weakness started 5/21, got progressively worse 2-3 days ago. Daughter also noticed patient had been falling with occasional episodes of urinary incontinence. CTH with small vessel disease, CT C-spine negative for acute fx. Admitted to medicine for further w/u. Gen neuro consulted, recommended MRI brain w/o. MRI brain: small focus of restricted diffusion in the R petra, left pontomedullary junction and right frontal periventricular white matter. Patient transferred to stroke tele for further management. Eliquis discontinued and started asa/plavix on 6/6 as no indicated for AC. IWONA this admission w/o thrombus. EP interrogated ILR - no arrythmias. Nephro consulted for worsening CKD, renal sono neg. Recommend can continue with current antihypertensive therapy and optimize blood glucose with endocrinology. Can consider initiating SGLT-2 Inhibitor outpatient i/s/o CKD w/ proteinuria. Endo following.        Neuro  #CVA workup  - d/c eliquis and started asa/plavix on 6/6. collateral obtained from outpt cards office: patient had a cryptogenic right thalamic stroke in 9/2019 and underwent loop recorder implant on 1/31/20 for said stroke. LR was checked in 8/2020 with no evidence of AF. 3/2021 patient admitted to [facility] for acute left basal ganglia and bilateral frontal lobe stroke. Loop recorder again showed no evidence of afib. Because stroke was suspicious for cardioembolic phenomenon the patient was started on apixaban. IWONA this admission w/o thrombus.  - continue atorvastatin 80mg daily  - q4hr stroke neuro checks and vitals  - MRI brain: small focus of restricted diffusion in the R petra, left pontomedullary junction and right frontal periventricular white matter  - Stroke Code HCT Results: small vessel disease  - MRA head without steno-occlusive disease, MRA neck without stenosis or occlusion  - B12: 447  - folate: 11.2  - kappa/lamda free light chain ratio: 1.66  - Stroke education  - EP interrogated ILR - no arrythmias  - f/u hypercoags  - PET: There is a 1 cm lesion in the left parotid gland with intense FDG uptake.  It should be noted that many benign tumors of the parotid gland can have intense FDG uptake.  - f/u ENT consult    #urinary incontinence, ? hyperreflexia r/o cord compression   - CT C-spine: Evaluation of the parotids glands demonstrates a approximately 11 mm well-demarcated lesion within the superficial lobe of the left parotid gland (series 5 image 25). The lesion demonstrates hyperintense signal on the T2-weighted images from the cervical MRI (series 12 image 3) and is incompletely evaluated. Precise histologic diagnosis will require tissue sampling. Primary considerations include enlarged parotid lymph node as well as primary parotid neoplasm such as benign mixed tumor or Warthin's tumor.  - PET: There is a 1 cm lesion in the left parotid gland with intense FDG uptake.  It should be noted that many benign tumors of the parotid gland can have intense FDGuptake.  - MRI C/T/L spine without cord compression    Cards  #HTN  - goal sBP 120-160, gradual normotension  - carvedilol increased to 6.25q12h on 6/9 due to BP above parameters, c/w Amlodipine 10mg. increased lisinopril from 20 to 40mg of 6/4  - can increase carvedilol if pressures continuously above 160. had some elevated pressures 6/9 but okay to hold off on increasing coreg for today  < from: Echocardiogram w/ Bubble and Doppler (06.06.23 @ 09:25) >     1. Mild symmetric left ventricular hypertrophy.   2. Low-normal left ventricular systolic function.   3. Normal right ventricular size and systolic function.   4. Normal atria.   5. Aortic sclerosis without significant stenosis.   6. No other significant valvular disease.   7. No evidence of pulmonary hypertension.   8. No pericardial effusion.   9. Injection of agitated saline via a peripheral vein reveals no   evidence of a right-to-left shunt.    < from: IWONA w/Doppler (06.06.23 @ 09:52) >   1. Normal left ventricular size and systolic function.   2. Normal right ventricular size and systolic function.   3. Mild tricuspid regurgitation.   4. No LA/RA/FRANCISCA/RAA thrombus seen.   5. There is an interatrial septal aneurysm. Minimal interatrial right to   left shunting noted predominantly with Valsalva suggestive of a tiny   patent foramen ovale.   6. There is severe non-mobile plaque seen in the visualized portion of   the descending aorta. There is severe non-mobile plaque seen in the   visualized portion of the aortic arch.   7. No pericardial effusion.    - Stroke Code EKG Results: NSR with L axis deviation and occasional Q waves but no active ischemic changes    #HLD  - high dose statin as above in CVA  - LDL results: 162    Pulm  - call provider if SPO2 < 94%    #asthma  - albuterol PRN    GI  #Nutrition/Fluids/Electrolytes   - replete K<4 and Mg <2  - Diet: Soft and bite sized  - MBS passed: soft and bite sized CC diet     Renal  #CKD   - Cr 1.7 on admission   - Will continue to trend  - collateral from PCP's office obtained:  5/2023 - Cr 1.57   9/2022 - Cr 1.38  6/2022 - Cr 1.34  - Nephro consulted for worsening CKD, renal sono neg. Recommend can continue with current antihypertensive therapy and optimize blood glucose with endocrinology. Can consider initiating SGLT-2 Inhibitor outpatient i/s/o CKD w/ proteinuria.    Infectious Disease  - Stroke Code CXR results: negative    Endocrine  #DM  - A1C results: 9.8  - endo following  Home regimen: 75/25 insulin 20 units QD, Metformin 1000mg BID and Rybelsus 3mg QD. She will likely not need insulin at discharge.  - Please continue lantus 5 units at bedtime.   - Continue lispro to 7 units before each meal.  - Continue lispro moderate dose sliding scale four times daily with meals and at bedtime.  - Patient's fingerstick glucose goal is 100-180 mg/dL.    - For discharge, patient can continue metformin 1000mg BID and rybelsus 3mg daily. Stop insulin. Freestyle Ted sent to [facility] and covered. Delivered to bedside. Reviewed how to apply with daughter and patient. Did not actually apply because it would need to removed for planned PET. Will apply right before discharge.  - Patient to follow up with physician's in [place], has new endocrine appointment.    ·  Problem: Thyroid nodule.   ·  Plan: There is 2.2 x 1.9 x 1.6 cm heterogeneous predominantly isoechoic nodule in the lower pole of right lobe.  Additional multiple spongiform benign-appearing thyroid nodules in left lobe  Repeat US in 6 months to determine if FNA is needed due to size >2cm. Discussed with daughter.  TFTs normal.      DVT Prophylaxis  - heparin + SCDs  - dopplers: neg    Dispo: rec'd for home PT/OT but wants to go to rehab  MOCA 8/30      Discussed daily hospital plans and goals with patient    Discussed with Dr. Eli

## 2023-06-09 NOTE — PROGRESS NOTE ADULT - SUBJECTIVE AND OBJECTIVE BOX
TRANSFER FROM STROKE [facility] TO [facility]    Hospital course: 62 y/o F with pmhx of [place] Palsy (left side), T2DM, CVA  (left-sided deficits), CAD, HLD, HTN, Asthma, current smoker with a 52 pack year hx, ?AFib reportedly compliant on Eliquis who presented for evaluation of generalized weakness. Weakness started , got progressively worse 2-3 days ago. Daughter also noticed patient had been falling with occasional episodes of urinary incontinence. CTH with small vessel disease, CT C-spine negative for acute fx. Admitted to medicine for further w/u. Gen neuro consulted, recommended MRI brain w/o. MRI brain: small focus of restricted diffusion in the R petra, left pontomedullary junction and right frontal periventricular white matter. Patient transferred to stroke [facility] for further management. Eliquis discontinued and started asa/plavix on  as no indicated for AC. IWONA this admission w/o thrombus. EP interrogated ILR - no arrythmias. Nephro consulted for worsening CKD, renal sono neg. Recommend can continue with current antihypertensive therapy and optimize blood glucose with endocrinology. Can consider initiating SGLT-2 Inhibitor outpatient i/s/o CKD w/ proteinuria. Pending PET and hypercoags. Endo following. Stepped down to regional . Pending AR.    INTERVAL HPI/OVERNIGHT EVENTS:  Patient seen and examined.     MEDICATIONS  (STANDING):  amLODIPine   Tablet 10 milliGRAM(s) Oral daily  aspirin enteric coated 81 milliGRAM(s) Oral daily  atorvastatin 80 milliGRAM(s) Oral at bedtime  carvedilol 6.25 milliGRAM(s) Oral every 12 hours  clopidogrel Tablet 75 milliGRAM(s) Oral daily  dextrose 5%. 1000 milliLiter(s) (50 mL/Hr) IV Continuous <Continuous>  dextrose 5%. 1000 milliLiter(s) (100 mL/Hr) IV Continuous <Continuous>  dextrose 50% Injectable 25 Gram(s) IV Push once  dextrose 50% Injectable 25 Gram(s) IV Push once  dextrose 50% Injectable 12.5 Gram(s) IV Push once  glucagon  Injectable 1 milliGRAM(s) IntraMuscular once  heparin   Injectable 7500 Unit(s) SubCutaneous every 8 hours  insulin glargine Injectable (LANTUS) 5 Unit(s) SubCutaneous at bedtime  insulin lispro (ADMELOG) corrective regimen sliding scale   SubCutaneous Before meals and at bedtime  insulin lispro Injectable (ADMELOG) 7 Unit(s) SubCutaneous three times a day before meals  lisinopril 40 milliGRAM(s) Oral daily  polyethylene glycol 3350 17 Gram(s) Oral daily  senna 2 Tablet(s) Oral at bedtime    MEDICATIONS  (PRN):  acetaminophen     Tablet .. 650 milliGRAM(s) Oral every 6 hours PRN Temp greater or equal to 38C (100.4F), Moderate Pain (4 - 6)  dextrose Oral Gel 15 Gram(s) Oral once PRN Blood Glucose LESS THAN 70 milliGRAM(s)/deciliter      Allergies    codeine (Unknown)    Intolerances        Vital Signs Last 24 Hrs  T(C): 36.5 (2023 08:41), Max: 36.9 (2023 13:51)  T(F): 97.7 (2023 08:41), Max: 98.4 (2023 13:51)  HR: 68 (2023 08:27) (57 - 68)  BP: 153/73 (2023 08:27) (144/84 - 210/86)  BP(mean): 105 (2023 08:27) (95 - 124)  RR: 18 (2023 08:27) (14 - 19)  SpO2: 97% (2023 08:27) (95% - 98%)    Parameters below as of 2023 08:27  Patient On (Oxygen Delivery Method): room air      Physical exam:  General: awake and alert  Eyes: Anicteric sclerae, moist conjunctivae, see below for CNs  Extremities: RLE swelling > LLE    Neurologic:  -Mental status: Awake, alert, oriented to person and place, able to tell month, but not the year or day of week. Speech is fluent with intact naming, repetition, and comprehension, mildly dysarthric. Follows simple commands. Easily distractible.  -Cranial nerves:   II: Visual fields are diminished on left temporal side.  III, IV, VI: Extraocular movements are intact without nystagmus. Pupils equally round and reactive to light  V: Decreased sensation along L V1-V3 (residual from her prior hx of Bell's palsy) on the left side. Sensory deficits split down the middle of her face. Patient reports 'tingling' on left side when touched.  VII: R NLFF. Decreased forehead wrinkling on the L and left-sided droop when smiling.  XII: Tongue protrudes midline. Evidence of oral thrush.  Motor: Normal bulk and tone. B/l UE 4/5 without drift. B/l LEs 3-/5 with drift, do not hit the bed.  Sensation: Intact to light touch bilaterally in upper and lower extremities, with some evidence of stocking-glove neuropathy. No neglect or extinction on double simultaneous testing.  Coordination: No obvious dysmetria with finger-to-nose or heel-shin testing. No dysdyadokinesia.   Gait: Deferred  Reflexes: +2 bilateral upper and lower    LABS:                        11.0   6.15  )-----------( 199      ( 2023 05:30 )             32.7     06-    138  |  107  |  26<H>  ----------------------------<  74  4.3   |  21<L>  |  1.47<H>    Ca    8.0<L>      2023 05:30  Phos  3.7     -  Mg     1.9     06-09        Urinalysis Basic - ( 2023 16:40 )    Color: Yellow / Appearance: Clear / S.020 / pH: x  Gluc: x / Ketone: NEGATIVE  / Bili: Negative / Urobili: 0.2 E.U./dL   Blood: x / Protein: 100 mg/dL / Nitrite: NEGATIVE   Leuk Esterase: NEGATIVE / RBC: < 5 /HPF / WBC 5-10 /HPF   Sq Epi: x / Non Sq Epi: x / Bacteria: Present /HPF        RADIOLOGY & ADDITIONAL TESTS:  reviewed   TRANSFER FROM STROKE [facility] TO [facility]    Hospital course: 64 y/o F with pmhx of [place] Palsy (left side), T2DM, CVA  (left-sided deficits), CAD, HLD, HTN, Asthma, current smoker with a 52 pack year hx, ?AFib reportedly compliant on Eliquis who presented for evaluation of generalized weakness. Weakness started , got progressively worse 2-3 days ago. Daughter also noticed patient had been falling with occasional episodes of urinary incontinence. CTH with small vessel disease, CT C-spine negative for acute fx. Admitted to medicine for further w/u. Gen neuro consulted, recommended MRI brain w/o. MRI brain: small focus of restricted diffusion in the R petra, left pontomedullary junction and right frontal periventricular white matter. Patient transferred to stroke [facility] for further management. Eliquis discontinued and started asa/plavix on  as no indicated for AC. IWONA this admission w/o thrombus. EP interrogated ILR - no arrythmias. Nephro consulted for worsening CKD, renal sono neg. Recommend can continue with current antihypertensive therapy and optimize blood glucose with endocrinology. Can consider initiating SGLT-2 Inhibitor outpatient i/s/o CKD w/ proteinuria. Pending PET and hypercoags. Endo following. Stepped down to regional . Recommended for home PT/OT.    INTERVAL HPI/OVERNIGHT EVENTS:  Patient seen and examined.     MEDICATIONS  (STANDING):  amLODIPine   Tablet 10 milliGRAM(s) Oral daily  aspirin enteric coated 81 milliGRAM(s) Oral daily  atorvastatin 80 milliGRAM(s) Oral at bedtime  carvedilol 6.25 milliGRAM(s) Oral every 12 hours  clopidogrel Tablet 75 milliGRAM(s) Oral daily  dextrose 5%. 1000 milliLiter(s) (50 mL/Hr) IV Continuous <Continuous>  dextrose 5%. 1000 milliLiter(s) (100 mL/Hr) IV Continuous <Continuous>  dextrose 50% Injectable 25 Gram(s) IV Push once  dextrose 50% Injectable 25 Gram(s) IV Push once  dextrose 50% Injectable 12.5 Gram(s) IV Push once  glucagon  Injectable 1 milliGRAM(s) IntraMuscular once  heparin   Injectable 7500 Unit(s) SubCutaneous every 8 hours  insulin glargine Injectable (LANTUS) 5 Unit(s) SubCutaneous at bedtime  insulin lispro (ADMELOG) corrective regimen sliding scale   SubCutaneous Before meals and at bedtime  insulin lispro Injectable (ADMELOG) 7 Unit(s) SubCutaneous three times a day before meals  lisinopril 40 milliGRAM(s) Oral daily  polyethylene glycol 3350 17 Gram(s) Oral daily  senna 2 Tablet(s) Oral at bedtime    MEDICATIONS  (PRN):  acetaminophen     Tablet .. 650 milliGRAM(s) Oral every 6 hours PRN Temp greater or equal to 38C (100.4F), Moderate Pain (4 - 6)  dextrose Oral Gel 15 Gram(s) Oral once PRN Blood Glucose LESS THAN 70 milliGRAM(s)/deciliter      Allergies    codeine (Unknown)    Intolerances        Vital Signs Last 24 Hrs  T(C): 36.5 (2023 08:41), Max: 36.9 (2023 13:51)  T(F): 97.7 (2023 08:41), Max: 98.4 (2023 13:51)  HR: 68 (2023 08:27) (57 - 68)  BP: 153/73 (2023 08:27) (144/84 - 210/86)  BP(mean): 105 (2023 08:27) (95 - 124)  RR: 18 (2023 08:27) (14 - 19)  SpO2: 97% (2023 08:27) (95% - 98%)    Parameters below as of 2023 08:27  Patient On (Oxygen Delivery Method): room air      Physical exam:  General: awake and alert  Eyes: Anicteric sclerae, moist conjunctivae, see below for CNs  Extremities: RLE swelling > LLE    Neurologic:  -Mental status: Awake, alert, oriented to person and place, able to tell month, but not the year or day of week. Speech is fluent with intact naming, repetition, and comprehension, mildly dysarthric. Follows simple commands. Easily distractible.  -Cranial nerves:   II: Visual fields are diminished on left temporal side.  III, IV, VI: Extraocular movements are intact without nystagmus. Pupils equally round and reactive to light  V: Decreased sensation along L V1-V3 (residual from her prior hx of Bell's palsy) on the left side. Sensory deficits split down the middle of her face. Patient reports 'tingling' on left side when touched.  VII: R NLFF. Decreased forehead wrinkling on the L and left-sided droop when smiling.  XII: Tongue protrudes midline. Evidence of oral thrush.  Motor: Normal bulk and tone. B/l UE 4/5 without drift. B/l LEs 3-/5 with drift, do not hit the bed.  Sensation: Intact to light touch bilaterally in upper and lower extremities, with some evidence of stocking-glove neuropathy. No neglect or extinction on double simultaneous testing.  Coordination: No obvious dysmetria with finger-to-nose or heel-shin testing. No dysdyadokinesia.   Gait: Deferred  Reflexes: +2 bilateral upper and lower    LABS:                        11.0   6.15  )-----------( 199      ( 2023 05:30 )             32.7     06-09    138  |  107  |  26<H>  ----------------------------<  74  4.3   |  21<L>  |  1.47<H>    Ca    8.0<L>      2023 05:30  Phos  3.7     -  Mg     1.9     06-09        Urinalysis Basic - ( 2023 16:40 )    Color: Yellow / Appearance: Clear / S.020 / pH: x  Gluc: x / Ketone: NEGATIVE  / Bili: Negative / Urobili: 0.2 E.U./dL   Blood: x / Protein: 100 mg/dL / Nitrite: NEGATIVE   Leuk Esterase: NEGATIVE / RBC: < 5 /HPF / WBC 5-10 /HPF   Sq Epi: x / Non Sq Epi: x / Bacteria: Present /HPF        RADIOLOGY & ADDITIONAL TESTS:  reviewed   Neurology progress note    INTERVAL HPI/OVERNIGHT EVENTS:  Patient seen and examined. Pressures increased when working with PT to 170s.    MEDICATIONS  (STANDING):  amLODIPine   Tablet 10 milliGRAM(s) Oral daily  aspirin enteric coated 81 milliGRAM(s) Oral daily  atorvastatin 80 milliGRAM(s) Oral at bedtime  carvedilol 6.25 milliGRAM(s) Oral every 12 hours  clopidogrel Tablet 75 milliGRAM(s) Oral daily  dextrose 5%. 1000 milliLiter(s) (50 mL/Hr) IV Continuous <Continuous>  dextrose 5%. 1000 milliLiter(s) (100 mL/Hr) IV Continuous <Continuous>  dextrose 50% Injectable 25 Gram(s) IV Push once  dextrose 50% Injectable 25 Gram(s) IV Push once  dextrose 50% Injectable 12.5 Gram(s) IV Push once  glucagon  Injectable 1 milliGRAM(s) IntraMuscular once  heparin   Injectable 7500 Unit(s) SubCutaneous every 8 hours  insulin glargine Injectable (LANTUS) 5 Unit(s) SubCutaneous at bedtime  insulin lispro (ADMELOG) corrective regimen sliding scale   SubCutaneous Before meals and at bedtime  insulin lispro Injectable (ADMELOG) 7 Unit(s) SubCutaneous three times a day before meals  lisinopril 40 milliGRAM(s) Oral daily  polyethylene glycol 3350 17 Gram(s) Oral daily  senna 2 Tablet(s) Oral at bedtime    MEDICATIONS  (PRN):  acetaminophen     Tablet .. 650 milliGRAM(s) Oral every 6 hours PRN Temp greater or equal to 38C (100.4F), Moderate Pain (4 - 6)  dextrose Oral Gel 15 Gram(s) Oral once PRN Blood Glucose LESS THAN 70 milliGRAM(s)/deciliter      Allergies    codeine (Unknown)    Intolerances        Vital Signs Last 24 Hrs  T(C): 36.5 (2023 08:41), Max: 36.9 (2023 13:51)  T(F): 97.7 (2023 08:41), Max: 98.4 (2023 13:51)  HR: 68 (2023 08:27) (57 - 68)  BP: 153/73 (2023 08:27) (144/84 - 210/86)  BP(mean): 105 (2023 08:27) (95 - 124)  RR: 18 (2023 08:27) (14 - 19)  SpO2: 97% (2023 08:27) (95% - 98%)    Parameters below as of 2023 08:27  Patient On (Oxygen Delivery Method): room air      Physical exam:  General: awake and alert  Eyes: Anicteric sclerae, moist conjunctivae, see below for CNs  Extremities: RLE swelling > LLE    Neurologic:  -Mental status: Awake, alert, oriented to person and place, able to tell month, but not the year or day of week. Speech is fluent with intact naming, repetition, and comprehension, mildly dysarthric. Follows simple commands. Easily distractible.  -Cranial nerves:   II: Visual fields are diminished on left temporal side.  III, IV, VI: Extraocular movements are intact without nystagmus. Pupils equally round and reactive to light  V: Decreased sensation along L V1-V3 (residual from her prior hx of Bell's palsy) on the left side. Sensory deficits split down the middle of her face. Patient reports 'tingling' on left side when touched.  VII: R NLFF. Decreased forehead wrinkling on the L and left-sided droop when smiling.  XII: Tongue protrudes midline. Evidence of oral thrush.  Motor: Normal bulk and tone. B/l UE 4/5 without drift. B/l LEs 3-/5 with drift, do not hit the bed.  Sensation: Intact to light touch bilaterally in upper and lower extremities, with some evidence of stocking-glove neuropathy. No neglect or extinction on double simultaneous testing.  Coordination: No obvious dysmetria with finger-to-nose or heel-shin testing. No dysdyadokinesia.   Gait: Deferred  Reflexes: +2 bilateral upper and lower    LABS:                        11.0   6.15  )-----------( 199      ( 2023 05:30 )             32.7     06-09    138  |  107  |  26<H>  ----------------------------<  74  4.3   |  21<L>  |  1.47<H>    Ca    8.0<L>      2023 05:30  Phos  3.7     06-09  Mg     1.9     06-09        Urinalysis Basic - ( 2023 16:40 )    Color: Yellow / Appearance: Clear / S.020 / pH: x  Gluc: x / Ketone: NEGATIVE  / Bili: Negative / Urobili: 0.2 E.U./dL   Blood: x / Protein: 100 mg/dL / Nitrite: NEGATIVE   Leuk Esterase: NEGATIVE / RBC: < 5 /HPF / WBC 5-10 /HPF   Sq Epi: x / Non Sq Epi: x / Bacteria: Present /HPF        RADIOLOGY & ADDITIONAL TESTS:  reviewed

## 2023-06-09 NOTE — DIETITIAN INITIAL EVALUATION ADULT - PROBLEM SELECTOR PLAN 3
Pt with hx of DM, taking Lispro 25U once a day at home. Pt's daughter Rosemarie states she believes her mother was told to take her insulin more frequently. Pt also taking Metformin 1g BID. Pt with Glucose of 360 in ED on admission, UA showed 500 glucose and proteinuria.   Plan:   - Pt with A1C of 9.8  - mISS inpatient with consistent carb diet  - Lantus 10U started inpatient, reassess pre-meal insulin requirements  - Consider Endocrine consult in AM

## 2023-06-09 NOTE — DIETITIAN NUTRITION RISK NOTIFICATION - ADDITIONAL COMMENTS/DIETITIAN RECOMMENDATIONS
**See Dietitian Initial Evaluation 6/9/23    Recommendations:  1. Continue current diet order - CSTCHO with Soft & Bite Size texture  >> Consider need for DASH/TLC diet  2. Add Glucerna Shake once daily (220kcal, 10gm pro)   3. Monitor PO intake  >> Consistently meet >75% of estimated needs during admission   4. Monitor chemistry, wt trends, GI function, and skin integrity   5. Honor food preferences as able  6. RD to remain available for additional nutrition interventions and diet edu as needed   **Moderate Nutrition Risk

## 2023-06-09 NOTE — DIETITIAN NUTRITION RISK NOTIFICATION - TREATMENT: THE FOLLOWING DIET HAS BEEN RECOMMENDED
Diet, Consistent Carbohydrate/No Snacks:   Soft and Bite Sized ([id number]) (06-08-23 @ 16:35) [Active]

## 2023-06-09 NOTE — DIETITIAN INITIAL EVALUATION ADULT - PERTINENT MEDS FT
MEDICATIONS  (STANDING):  amLODIPine   Tablet 10 milliGRAM(s) Oral daily  aspirin enteric coated 81 milliGRAM(s) Oral daily  atorvastatin 80 milliGRAM(s) Oral at bedtime  carvedilol 6.25 milliGRAM(s) Oral every 12 hours  clopidogrel Tablet 75 milliGRAM(s) Oral daily  dextrose 5%. 1000 milliLiter(s) (100 mL/Hr) IV Continuous <Continuous>  dextrose 5%. 1000 milliLiter(s) (50 mL/Hr) IV Continuous <Continuous>  dextrose 50% Injectable 25 Gram(s) IV Push once  dextrose 50% Injectable 25 Gram(s) IV Push once  dextrose 50% Injectable 12.5 Gram(s) IV Push once  glucagon  Injectable 1 milliGRAM(s) IntraMuscular once  heparin   Injectable 7500 Unit(s) SubCutaneous every 8 hours  insulin glargine Injectable (LANTUS) 5 Unit(s) SubCutaneous at bedtime  insulin lispro (ADMELOG) corrective regimen sliding scale   SubCutaneous Before meals and at bedtime  insulin lispro Injectable (ADMELOG) 7 Unit(s) SubCutaneous three times a day before meals  lisinopril 40 milliGRAM(s) Oral daily  polyethylene glycol 3350 17 Gram(s) Oral daily  senna 2 Tablet(s) Oral at bedtime    MEDICATIONS  (PRN):  acetaminophen     Tablet .. 650 milliGRAM(s) Oral every 6 hours PRN Temp greater or equal to 38C (100.4F), Moderate Pain (4 - 6)  dextrose Oral Gel 15 Gram(s) Oral once PRN Blood Glucose LESS THAN 70 milliGRAM(s)/deciliter

## 2023-06-09 NOTE — DIETITIAN INITIAL EVALUATION ADULT - ADD RECOMMEND
1. Continue current diet order - CSTCHO with Soft & Bite Size texture  >> Add Glucerna Shake once daily (220kcal, 10gm pro)   2. Monitor PO intake  >> Consistently meet >75% of estimated needs during admission   3. Monitor chemistry, wt trends, GI function, and skin integrity   4. Honor food preferences as able  5. RD to remain available for additional nutrition interventions and diet edu as needed   **Moderate Nutrition Risk  1. Continue current diet order - CSTCHO with Soft & Bite Size texture  >> Consider need for DASH/TLC diet  2. Add Glucerna Shake once daily (220kcal, 10gm pro)   3. Monitor PO intake  >> Consistently meet >75% of estimated needs during admission   4. Monitor chemistry, wt trends, GI function, and skin integrity   5. Honor food preferences as able  6. RD to remain available for additional nutrition interventions and diet edu as needed   **Communicated with team  **Moderate Nutrition Risk  1. Continue current diet order - CSTCHO with Soft & Bite Size texture  >> Consider need for DASH/TLC diet  2. Add Glucerna Shake once daily (220kcal, 10gm pro)   3. Monitor PO intake  >> Consistently meet >75% of estimated needs during admission   4. Monitor chemistry, wt trends, GI function, and skin integrity   5. Honor food preferences as able  6. RD to remain available for additional nutrition interventions and diet edu as needed   **Team paged  **Moderate Nutrition Risk

## 2023-06-09 NOTE — DIETITIAN INITIAL EVALUATION ADULT - OTHER CALCULATIONS
Ht: 5'0", CBW: 97kg/213.8lb, IBW 45.5kg/100lb +/-10%, %% . IBW used to calculate estimated needs based on Standards of Care given pt within exceeds 120% IBW. Adjusted for elderly maintenance. **Fluids per team

## 2023-06-09 NOTE — DIETITIAN INITIAL EVALUATION ADULT - OTHER INFO
64 y/o F with a PMHx of [place] Palsy, TIIDM, CVA (left side weak), CAD, HLD, HTN, Asthma, current smoker with a 52 pack year hx, brought for evaluation of worsening acute on chronic weakness since 5/21. Stepped down to regional 6/8. Pending AR.    Pt seen by RDN on 5LA for nutrition assessment. On assessment, pt OOB in chair. No reported allergies to food. No cultural, Oriental orthodox, or other food preferences. Currently placed on a CSTCHO diet with Soft & Bite sized texture. Pt endorses fair PO intake in-house, consuming 3 meals/day and completing ~50%. PTA pt stated she was not following a therapeutic diet, however, does wear a CGM; A1C (6/3) 9.8. Pt reported decreased PO intake x 2 mo d/t a change of taste/smell. Pt was supplementing with Glucerna Shake once daily and reported having in bag at bedside. Pt endorses UBW 200lb ~2 months ago with unintentional wt gain. Pt’s CBW (6/9) 97kg/213.8lb reflective of +13.8lb/+7% wt change x ~2 months. Pt noted reduced mobility d/t weakness. Reviewed prescribed diet and texture modification with pt and provided printed handouts for reference; pt receptive. Pt made aware RDN remains available. Please see nutrition recommendations. Will continue to monitor per RD protocol.     GI: Denies N/V/D; ordered for Miralax and Senna, +BM 6/7. No abd distention noted.  Skin: No edema or pressure injuries noted at this time.  Pain: None reported during RD interview

## 2023-06-09 NOTE — PROVIDER CONTACT NOTE (OTHER) - REASON
BP above paramter
HIGH BP
High BP
Hypertensive
high bp
/84
BP = 187/77
post PT bp out of parameters
hypertension
SBP Out of Parameter
hypertension
 systolic
SBP out of parameter
hypertension

## 2023-06-09 NOTE — PROVIDER CONTACT NOTE (OTHER) - BACKGROUND
Rt pontine infarct. SBP within range during day time. SBp goal changed aroun 1700 from SBP <180 to SBP<160.
Psotiive for Rt pontine infarct. SBP Goal changed today from ,180 to <160.
Initial /86. Reassessment in 210s. Pt asymptomatic. Admitted for Rt pontine infarct.
Rt Pontine Infarct. Hypertensive out of parameter yesterday night. 1x 10mg IV Labetolol given.
admitted from 4U for stroke work up
stroke
stroke
. Weakness started 5/21, got progressively worse 2-3 days ago. Daughter also noticed patient had been falling with occasional episodes of urinary incontinence. CTH with small vessel disease
stroke

## 2023-06-09 NOTE — DIETITIAN INITIAL EVALUATION ADULT - PERTINENT LABORATORY DATA
06-09    138  |  107  |  26<H>  ----------------------------<  74  4.3   |  21<L>  |  1.47<H>    Ca    8.0<L>      09 Jun 2023 05:30  Phos  3.7     06-09  Mg     1.9     06-09    POCT Blood Glucose.: 154 mg/dL (06-09-23 @ 11:23)  A1C with Estimated Average Glucose Result: 9.8 % (06-03-23 @ 05:30)  A1C with Estimated Average Glucose Result: 9.8 % (06-02-23 @ 14:20)

## 2023-06-09 NOTE — PROGRESS NOTE ADULT - SUBJECTIVE AND OBJECTIVE BOX
SUBJECTIVE / INTERVAL HPI: Patient was seen and examined this morning. Patient feeling well. Bedtime hyperglycemia from fruit after dinner. Discussed with patient and daughter fruit portions and minimizing juice, even if it is juiced at home as it contain natural sugar that also raises blood glucose. Snacking between meals should be kept at a minimum. She is eating well at mealtime and reports feeling full.    CAPILLARY BLOOD GLUCOSE & INSULIN RECEIVED  106 mg/dL (06-08 @ 16:11) - Lispro 7  <10 mg/dL (06-08 @ 21:06) - Lantus 5 + lispro 4  202 mg/dL (06-08 @ 21:08)  74 mg/dL (06-09 @ 05:30)  93 mg/dL (06-09 @ 06:26) - Lispro 7  154 mg/dL (06-09 @ 11:23) - Lispro 7+2  91 mg/dL (06-09 @ 16:32)       REVIEW OF SYSTEMS  Constitutional:  Negative fever, chills or loss of appetite.  Eyes:  Negative blurry vision or double vision.  Cardiovascular:  Negative for chest pain or palpitations.  Respiratory:  Negative for cough, wheezing, or shortness of breath.    Gastrointestinal:  Negative for nausea, vomiting, diarrhea, constipation, or abdominal pain.  Genitourinary:  Negative frequency, urgency or dysuria.  Neurologic:  No headache, confusion, dizziness, lightheadedness. + weakness    PHYSICAL EXAM  Vital Signs Last 24 Hrs  T(C): 36.9 (09 Jun 2023 18:17), Max: 36.9 (09 Jun 2023 18:17)  T(F): 98.5 (09 Jun 2023 18:17), Max: 98.5 (09 Jun 2023 18:17)  HR: 65 (09 Jun 2023 15:40) (57 - 75)  BP: 166/79 (09 Jun 2023 15:40) (143/75 - 210/86)  BP(mean): 114 (09 Jun 2023 15:40) (95 - 124)  RR: 18 (09 Jun 2023 15:40) (14 - 19)  SpO2: 96% (09 Jun 2023 15:40) (95% - 98%)    Parameters below as of 09 Jun 2023 15:40  Patient On (Oxygen Delivery Method): room air      Constitutional: Awake, alert, in no acute distress.   HEENT: Normocephalic, atraumatic, ANDREA.  Respiratory: Lungs clear to ausculation bilaterally.   Cardiovascular: regular rhythm, normal S1 and S2, no audible murmurs.   GI: soft, non-tender, non-distended, bowel sounds present.  Extremities: No lower extremity edema.  Psychiatric: AAO x 3. Normal affect/mood.     LABS  CBC - WBC/HGB/HTC/PLT: 6.15/11.0/32.7/199 (06-09-23)  BMP - Na/K/Cl/Bicarb/BUN/Cr/Gluc/AG/eGFR: 138/4.3/107/21/26/1.47/74/10/39 (06-09-23)  Ca - 8.0 (06-09-23)  Phos - 3.7 (06-09-23)  Mg - 1.9 (06-09-23)  LFT - Alb/Tprot/Tbili/Dbili/AlkPhos/ALT/AST: 3.2/6.2/0.3/--/98/11/11 (06-03-23)    Thyroid Stimulating Hormone, Serum: 1.670 (06-04-23)  Total T4/Free T4: --/1.150 (06-04-23)    MEDICATIONS  MEDICATIONS  (STANDING):  amLODIPine   Tablet 10 milliGRAM(s) Oral daily  aspirin enteric coated 81 milliGRAM(s) Oral daily  atorvastatin 80 milliGRAM(s) Oral at bedtime  carvedilol 6.25 milliGRAM(s) Oral every 12 hours  clopidogrel Tablet 75 milliGRAM(s) Oral daily  dextrose 5%. 1000 milliLiter(s) (100 mL/Hr) IV Continuous <Continuous>  dextrose 5%. 1000 milliLiter(s) (50 mL/Hr) IV Continuous <Continuous>  dextrose 50% Injectable 25 Gram(s) IV Push once  dextrose 50% Injectable 25 Gram(s) IV Push once  dextrose 50% Injectable 12.5 Gram(s) IV Push once  glucagon  Injectable 1 milliGRAM(s) IntraMuscular once  heparin   Injectable 7500 Unit(s) SubCutaneous every 8 hours  insulin glargine Injectable (LANTUS) 5 Unit(s) SubCutaneous at bedtime  insulin lispro (ADMELOG) corrective regimen sliding scale   SubCutaneous Before meals and at bedtime  insulin lispro Injectable (ADMELOG) 7 Unit(s) SubCutaneous three times a day before meals  lisinopril 40 milliGRAM(s) Oral daily  polyethylene glycol 3350 17 Gram(s) Oral daily  senna 2 Tablet(s) Oral at bedtime    MEDICATIONS  (PRN):  acetaminophen     Tablet .. 650 milliGRAM(s) Oral every 6 hours PRN Temp greater or equal to 38C (100.4F), Moderate Pain (4 - 6)  dextrose Oral Gel 15 Gram(s) Oral once PRN Blood Glucose LESS THAN 70 milliGRAM(s)/deciliter

## 2023-06-09 NOTE — PROGRESS NOTE ADULT - PROBLEM SELECTOR PLAN 2
There is 2.2 x 1.9 x 1.6 cm heterogeneous predominantly isoechoic nodule   in the lower pole of right lobe.  Additional multiple spongiform benign-appearing thyroid nodules in left lobe  Repeat US in 6 months to determine if FNA is needed due to size >2cm. Discussed with daughter.  TFTs normal.

## 2023-06-09 NOTE — DIETITIAN INITIAL EVALUATION ADULT - PROBLEM SELECTOR PLAN 5
Pt w/ hx of CAD with stent placement as well as loop recorder. Pt taking Eliquis 5mg q12h, Atorvastatin 80mg, but no DAPT therapy.  Plan:   - C/w Atorvastatin 80mg QD   - Stroke consulted to assess need for ASA and to r/o further ischemic CVA

## 2023-06-09 NOTE — PROVIDER CONTACT NOTE (OTHER) - RECOMMENDATIONS
notify stroke team
medication
recheck in 15 min
medication
NESSA Guzman made aware
NESSA Guzman made aware.
NESSA Guzman made aware.
monitor
NESSA osuna.

## 2023-06-09 NOTE — DIETITIAN INITIAL EVALUATION ADULT - PROBLEM SELECTOR PLAN 1
On examination, pt with slow gait and positive Rombergs. Pt denies diarrheal illness predisposing for GBS. Insidious onset of b/l lower and upper extremity weakness suspicious for diabetic neuropathy i/s/o poor medication compliance. CTH on 6/2 negative for acute CVA, though small vessel disease suggestive of chronic ischemia noted. Additionally, CTH noted to show a left thalamic lacunar infarct. Slit-like hypodensity in the left striatal capsular region (1:11), which may represent sequela of prior hemorrhage/infarct. Cannot rule out ischemic cause of weakness.   Plan:  - Neurology consulted, appreciate recs  - Will obtain B12, folate, RPR to r/o dorsal column process  - PT consult

## 2023-06-09 NOTE — CONSULT NOTE ADULT - SUBJECTIVE AND OBJECTIVE BOX
HPI: 63y Female PMH of [place] Palsy (on the left side for >25 years), TIIDM, CVA (left side weak), CAD, HLD, HTN, Asthma, current smoker with a 52 pack year hx, p/w worsening generalized weakness found to have acute R pontine stroke. PET/CT ordered for hypercoagulability workup which showed a 1cm lesion in left parotid gland with intense FDG uptake. Patient denies xerostomia, denies palpable facial mass, denies LAD/tender or stiff neck movement. Denies history of prior head/neck cancer. Has been evaluated before for hearing loss, tinnitus, vertigo worse in L ear and has taken medication for dizziness in the past but did not tolerate. Denies any change in L sided facial weakness what started over 25 years ago and has been stable. Denies issues with closing eyes or dry eyes. No fevers/sweats/chills or recent weight loss.    ALLERGIES  codeine (Unknown)    PAST MEDICAL & SURGICAL HISTORY:  [place] Palsy, T2DM, CVA, CAD, HLD, HTN, asthma, tobacco use    MEDICATIONS  (STANDING):  amLODIPine   Tablet 10 milliGRAM(s) Oral daily  aspirin enteric coated 81 milliGRAM(s) Oral daily  atorvastatin 80 milliGRAM(s) Oral at bedtime  carvedilol 6.25 milliGRAM(s) Oral every 12 hours  clopidogrel Tablet 75 milliGRAM(s) Oral daily  dextrose 5%. 1000 milliLiter(s) (100 mL/Hr) IV Continuous <Continuous>  dextrose 5%. 1000 milliLiter(s) (50 mL/Hr) IV Continuous <Continuous>  dextrose 50% Injectable 25 Gram(s) IV Push once  dextrose 50% Injectable 25 Gram(s) IV Push once  dextrose 50% Injectable 12.5 Gram(s) IV Push once  glucagon  Injectable 1 milliGRAM(s) IntraMuscular once  heparin   Injectable 7500 Unit(s) SubCutaneous every 8 hours  insulin glargine Injectable (LANTUS) 3 Unit(s) SubCutaneous at bedtime  insulin lispro (ADMELOG) corrective regimen sliding scale   SubCutaneous Before meals and at bedtime  insulin lispro Injectable (ADMELOG) 7 Unit(s) SubCutaneous three times a day before meals  lisinopril 40 milliGRAM(s) Oral daily  polyethylene glycol 3350 17 Gram(s) Oral daily  senna 2 Tablet(s) Oral at bedtime    MEDICATIONS  (PRN):  acetaminophen     Tablet .. 650 milliGRAM(s) Oral every 6 hours PRN Temp greater or equal to 38C (100.4F), Moderate Pain (4 - 6)  dextrose Oral Gel 15 Gram(s) Oral once PRN Blood Glucose LESS THAN 70 milliGRAM(s)/deciliter    SOCIAL HISTORY:  Tobacco History: 52 pack years, active smoker  ETOH Use: Denies  Drug Use: Denies  Retired      REVIEW OF SYSTEMS: b/l LE and UE weakness, h/o thyroid nodules    LABS:  CBC-                        11.0   6.15  )-----------( 199      ( 09 Jun 2023 05:30 )             32.7         06-09    138  |  107  |  26<H>  ----------------------------<  74  4.3   |  21<L>  |  1.47<H>    Ca    8.0<L>      09 Jun 2023 05:30  Phos  3.7     06-09  Mg     1.9     06-09      Coagulation Studies-    Endocrine Panel-  --  --  8.0 mg/dL  --  --  8.5 mg/dL      Vital Signs Last 24 Hrs  T(C): 36.9 (09 Jun 2023 18:17), Max: 36.9 (09 Jun 2023 18:17)  T(F): 98.5 (09 Jun 2023 18:17), Max: 98.5 (09 Jun 2023 18:17)  HR: 63 (09 Jun 2023 18:42) (57 - 75)  BP: 153/68 (09 Jun 2023 18:42) (143/75 - 210/86)  BP(mean): 98 (09 Jun 2023 18:42) (95 - 124)  RR: 18 (09 Jun 2023 18:42) (14 - 19)  SpO2: 94% (09 Jun 2023 18:42) (94% - 98%)    Parameters below as of 09 Jun 2023 18:42  Patient On (Oxygen Delivery Method): room air    PHYSICAL EXAM:  ENT EXAM-   Constitutional: Well-developed, well-nourished. Soft voice    Head:  normocephalic, atraumatic.   Ears:  Normal external appearance  Nose:  Septum intact, midline.  Inferior turbinates normal bilateral  OC/OP: Tonsils 1+. Floor of mouth, buccal mucosa, lips, hard palate, soft palate, uvula, posterior pharyngeal wall normal.  Mucosa moist.  Neck:  Trachea midline.  Thyroid, parotid and submandibular glands normal.  Lymph:  No cervical adenopathy.  Facial Plastics:   MULTISYSTEM EXAM-  Neuro/Psych:  A&O x 3.  Mood stable.  Affect bright.  Facial nerve exam: Peripheral flaccid paralysis left face, HB 3-4, R facial weakness HB 2  Eyes:  EOMI, no nystagmus.  Pulm:  No dyspnea, non-labored breathing  Cardiovascular: Carotid pulses 2+ bilaterally.  No peripheral edema.  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   Baseline study to help   determine initial treatment strategy.  Procedure: Intravenous access was established and the patient was   injected with 8.9 mCi of [id number]. After approximately 1   hour in a quiet room, the patient was positioned on the imaging table   with the arms as high above the head as possible.  PET/CT imaging was   then performed with the standard protocol from the base of the skull to   the mid thighs.  The CT scan is a low dose protocol with images used for   attenuation correction and localization only.  PET images were reconstructed in axial, sagittal and coronal planes using   CT based attenuation correction.  Images were displayed as PET, CT and   fused data sets as well as maximum intensity pixel projections.  Findings: In the head and neck area there is a focal area of intense   activity (maximum SUV 30.6) in the superficial lobe of the left parotid   gland associated with a well-demarcated lesion in the superficial lobe.  No other abnormal uptake is seen in this area.  In the chest, a normal amount of activity is seen in the heart and great   vessels.  No large nodules are appreciated on this low dose CT scan and   no abnormal FDG uptake is seen in the lungs.  In the abdomen and pelvis, there is a normal amount of activity in the   liver, spleen, kidneys, ureters, bladder and bowel.  Residual barium is   seen in the bowel from the patient's recent barium swallow.  Increased activity is associated with degenerative changes in the   shoulders and hips.  No other abnormal bony uptake is seen.  Impression: There is a 1 cm lesion in the left parotid gland with intense   FDG uptake.  It should be noted that many benign tumors of the parotid   gland can have intense FDG uptake.  Please see the addendum to a CT scan   of the cervical spine performed on 6/2/2023 for further discussion.  --- End of Report ---  KANNAN WAN MD; Attending Nuclear Medicine  This document has been electronically signed. Jun 9 2023  3:16PM    ACC: [id number] EXAM:  CT CERVICAL SPINE   ORDERED BY: CARMEN BULL   *** ADDENDUM # 1 ***  Evaluation of the parotids glands demonstrates a approximately 11 mm   well-demarcated lesion within the superficial lobe of the left parotid   gland (series 5 image 25). The lesion demonstrates hyperintense signal on   the T2-weighted images from the cervical MRI (series 12 image 3) and is   incompletely evaluated. Precise histologic diagnosis will require tissue   sampling. Primary considerations include enlarged parotid lymph node as   well as primary parotid neoplasm such as benign mixed tumor or Warthin's   tumor.  --- End of Report ---  *** END OF ADDENDUM # 1 ***  PROCEDURE DATE:  06/02/2023    INTERPRETATION:  PROCEDURE: CT cervical spine without contrast  INDICATION: Weakness, falls, mid cervical spine tenderness  TECHNIQUE: Multiple axial sections were obtained from the mid orbits to   the sternoclavicular joint. Sagittal and coronal reformats were obtained   from the axial data set. The images were reviewed in soft tissue and bone   windows.  COMPARISON: None  FINDINGS: The CT examination is limited by penetration secondary to body   habitus. There loss of the normal cervical lordosis which may be   secondary to positioning. The vertebral body heights and intervertebral   disc spaces are maintained. The prevertebral soft tissues are within   normal limits. There is poor evaluation for vertebral marrow which   appears to be osteopenic. No definite aggressive lesion is identified.   The osseous structures are otherwise intact without fracture.  At the C2/3 level, there is no disc herniation. There is no central   spinal canal stenosis or neural foramen narrowing.  At the C3/4 level, there is no disc herniation. There is no central   spinal canal stenosis or neural foramen narrowing.  Limited evaluation of the neck demonstrates a approximately 7 mm   hypodense nodule within the right lobe of the thyroid gland which is   nonspecific (series 6 image 53).  IMPRESSION: No fracture.  --- End of Report ---  ***Please see the addendum at the top of this report. It may contain   additional important information or changes.****  TE ANGLIN MD; Attending Radiologist  This document has been electronically signed. Jun 2 2023  3:29PM  1st Addendum: TE ANGLIN MD; Attending Radiologist  The first addendum was electronically signed on: Jun 9 2023 10:09AM.    A/P:  63y Female PMH of [place] Palsy (on the left side for >25 years), TIIDM, CVA (left side weak), CAD, HLD, HTN, Asthma, current smoker with a 52 pack year hx admitted with acute R pontine stroke with incidental left parotid lesion noted on imaging for hypercoagulability work up.     - No acute ENT intervention at this time  - Recommend outpatient follow up with Dr. Carissa Ortega at [facility] for further work up of parotid lesion which will likely require US-guided FNA by IR  - Seen and discussed with Dr. Christian      Thank you for the consult, please page ENT at [phone] with any questions/concerns.  ----------------------------------------------------    Department of Otolaryngology - Head and Neck Surgery  [facility]

## 2023-06-09 NOTE — PROVIDER CONTACT NOTE (OTHER) - SITUATION
blood pressure not w/in parameters
PT s/p 10mg IV Labetolol for SBP >200 at 2000.
pt  on q 4 hr vitals
/84. BP is outside of parameters, notify provider.
Bp out of range, patient asymptomatic.
Pt /71 pt asymptomatic
pt bp out of paramaters bp 183/78 hr 66
pt s/p being seen by physical therapist. PT /84 at 1253pm repeated at 1pm bp 175/79
stroke patient
Pt. with high BP
SBP in 200s. SBP Goal 120-160.
repeat blood pressure elevated
Hypertensive up to 170s. Pt asymptomatic.
SBP 210s prior to giving IV Labetolol
blood pressure elevated

## 2023-06-09 NOTE — PROGRESS NOTE ADULT - PROBLEM SELECTOR PLAN 1
Type 2 diabetes mellitus - uncontrolled with hyperglycemia  -likely medication non-compliance vs adjustment needed   Home regimen: 75/25 insulin 20 units QD, Metformin 1000mg BID and Rybelsus 3mg QD. She will likely not need insulin at discharge.  - Decrease continue lantus to 3 units at bedtime.   - Continue lispro to 7 units before each meal.  - Continue lispro moderate dose sliding scale four times daily with meals and at bedtime.  - Patient's fingerstick glucose goal is 100-180 mg/dL.    - For discharge, patient can continue metformin 1000mg BID and rybelsus 3mg daily. Stop home insulin. For acute rehab - lispro MISS before meals and at bedtime. Freestyle Ted sent to [facility] and covered. Delivered to bedside. Reviewed how to apply with daughter and patient. Did not actually apply because it would need to removed for planned PET. Will apply right before discharge.  - Patient to follow up with physician's in [place], has new endocrine appointment.

## 2023-06-09 NOTE — PROVIDER CONTACT NOTE (OTHER) - NAME OF MD/NP/PA/DO NOTIFIED:
Mae Guzman Stroke ACP
[facility]
NESSA Guzman
NESSA Muñoz
NESSA chen
Saurabh Stroke ACP
Stroke ACP Jose Luis
kervin chao
kervin chao
Pio King
[place] [phone]
NESSA Guzman
Jorge stroke ACP
kervin orozco

## 2023-06-09 NOTE — PROGRESS NOTE ADULT - ASSESSMENT
62 y/o F with pmhx of [place] Palsy (left side), T2DM, CVA 2022 (left-sided deficits), CAD, HLD, HTN, Asthma, current smoker with a 52 pack year hx, ? AFib reportedly compliant on Eliquis who presented for evaluation of generalized weakness. Found to have R pontine infarct. Endocrinology consulted for diabetes management.    A1C: 9.8 %  BUN: 26  Creatinine: 1.47  GFR: 39  Weight: 96.4  BMI: 41.5  EF: nl

## 2023-06-10 LAB
ANION GAP SERPL CALC-SCNC: 8 MMOL/L — SIGNIFICANT CHANGE UP (ref 5–17)
BUN SERPL-MCNC: 30 MG/DL — HIGH (ref 7–23)
CALCIUM SERPL-MCNC: 8.5 MG/DL — SIGNIFICANT CHANGE UP (ref 8.4–10.5)
CHLORIDE SERPL-SCNC: 108 MMOL/L — SIGNIFICANT CHANGE UP (ref 96–108)
CO2 SERPL-SCNC: 22 MMOL/L — SIGNIFICANT CHANGE UP (ref 22–31)
CREAT SERPL-MCNC: 1.69 MG/DL — HIGH (ref 0.5–1.3)
EGFR: 33 ML/MIN/1.73M2 — LOW
GLUCOSE BLDC GLUCOMTR-MCNC: 170 MG/DL — HIGH (ref 70–99)
GLUCOSE BLDC GLUCOMTR-MCNC: 80 MG/DL — SIGNIFICANT CHANGE UP (ref 70–99)
GLUCOSE BLDC GLUCOMTR-MCNC: 95 MG/DL — SIGNIFICANT CHANGE UP (ref 70–99)
GLUCOSE BLDC GLUCOMTR-MCNC: 96 MG/DL — SIGNIFICANT CHANGE UP (ref 70–99)
GLUCOSE SERPL-MCNC: 76 MG/DL — SIGNIFICANT CHANGE UP (ref 70–99)
HCT VFR BLD CALC: 33.3 % — LOW (ref 34.5–45)
HGB BLD-MCNC: 11.1 G/DL — LOW (ref 11.5–15.5)
MAGNESIUM SERPL-MCNC: 2 MG/DL — SIGNIFICANT CHANGE UP (ref 1.6–2.6)
MCHC RBC-ENTMCNC: 26.8 PG — LOW (ref 27–34)
MCHC RBC-ENTMCNC: 33.3 GM/DL — SIGNIFICANT CHANGE UP (ref 32–36)
MCV RBC AUTO: 80.4 FL — SIGNIFICANT CHANGE UP (ref 80–100)
NRBC # BLD: 0 /100 WBCS — SIGNIFICANT CHANGE UP (ref 0–0)
PHOSPHATE SERPL-MCNC: 3.7 MG/DL — SIGNIFICANT CHANGE UP (ref 2.5–4.5)
PLATELET # BLD AUTO: 214 K/UL — SIGNIFICANT CHANGE UP (ref 150–400)
POTASSIUM SERPL-MCNC: 4.6 MMOL/L — SIGNIFICANT CHANGE UP (ref 3.5–5.3)
POTASSIUM SERPL-SCNC: 4.6 MMOL/L — SIGNIFICANT CHANGE UP (ref 3.5–5.3)
PROT S FREE PPP-ACNC: 25 % — LOW (ref 63–140)
RBC # BLD: 4.14 M/UL — SIGNIFICANT CHANGE UP (ref 3.8–5.2)
RBC # FLD: 14.1 % — SIGNIFICANT CHANGE UP (ref 10.3–14.5)
SODIUM SERPL-SCNC: 138 MMOL/L — SIGNIFICANT CHANGE UP (ref 135–145)
WBC # BLD: 6.34 K/UL — SIGNIFICANT CHANGE UP (ref 3.8–10.5)
WBC # FLD AUTO: 6.34 K/UL — SIGNIFICANT CHANGE UP (ref 3.8–10.5)

## 2023-06-10 PROCEDURE — 99233 SBSQ HOSP IP/OBS HIGH 50: CPT

## 2023-06-10 PROCEDURE — 99221 1ST HOSP IP/OBS SF/LOW 40: CPT

## 2023-06-10 RX ORDER — DONEPEZIL HYDROCHLORIDE 10 MG/1
5 TABLET, FILM COATED ORAL AT BEDTIME
Refills: 0 | Status: DISCONTINUED | OUTPATIENT
Start: 2023-06-10 | End: 2023-06-13

## 2023-06-10 RX ORDER — CARVEDILOL PHOSPHATE 80 MG/1
12.5 CAPSULE, EXTENDED RELEASE ORAL EVERY 12 HOURS
Refills: 0 | Status: DISCONTINUED | OUTPATIENT
Start: 2023-06-11 | End: 2023-06-13

## 2023-06-10 RX ORDER — INSULIN LISPRO 100/ML
5 VIAL (ML) SUBCUTANEOUS
Refills: 0 | Status: DISCONTINUED | OUTPATIENT
Start: 2023-06-10 | End: 2023-06-13

## 2023-06-10 RX ORDER — LIDOCAINE 4 G/100G
1 CREAM TOPICAL ONCE
Refills: 0 | Status: COMPLETED | OUTPATIENT
Start: 2023-06-10 | End: 2023-06-10

## 2023-06-10 RX ORDER — CARVEDILOL PHOSPHATE 80 MG/1
6.25 CAPSULE, EXTENDED RELEASE ORAL ONCE
Refills: 0 | Status: COMPLETED | OUTPATIENT
Start: 2023-06-10 | End: 2023-06-10

## 2023-06-10 RX ADMIN — POLYETHYLENE GLYCOL 3350 17 GRAM(S): 17 POWDER, FOR SOLUTION ORAL at 12:01

## 2023-06-10 RX ADMIN — HEPARIN SODIUM 7500 UNIT(S): 5000 INJECTION INTRAVENOUS; SUBCUTANEOUS at 13:38

## 2023-06-10 RX ADMIN — CLOPIDOGREL BISULFATE 75 MILLIGRAM(S): 75 TABLET, FILM COATED ORAL at 12:01

## 2023-06-10 RX ADMIN — DONEPEZIL HYDROCHLORIDE 5 MILLIGRAM(S): 10 TABLET, FILM COATED ORAL at 21:45

## 2023-06-10 RX ADMIN — Medication 81 MILLIGRAM(S): at 12:01

## 2023-06-10 RX ADMIN — LIDOCAINE 1 PATCH: 4 CREAM TOPICAL at 12:02

## 2023-06-10 RX ADMIN — CARVEDILOL PHOSPHATE 6.25 MILLIGRAM(S): 80 CAPSULE, EXTENDED RELEASE ORAL at 17:35

## 2023-06-10 RX ADMIN — HEPARIN SODIUM 7500 UNIT(S): 5000 INJECTION INTRAVENOUS; SUBCUTANEOUS at 05:36

## 2023-06-10 RX ADMIN — LIDOCAINE 1 PATCH: 4 CREAM TOPICAL at 18:10

## 2023-06-10 RX ADMIN — HEPARIN SODIUM 7500 UNIT(S): 5000 INJECTION INTRAVENOUS; SUBCUTANEOUS at 21:44

## 2023-06-10 RX ADMIN — AMLODIPINE BESYLATE 10 MILLIGRAM(S): 2.5 TABLET ORAL at 05:37

## 2023-06-10 RX ADMIN — Medication 2: at 21:47

## 2023-06-10 RX ADMIN — CARVEDILOL PHOSPHATE 6.25 MILLIGRAM(S): 80 CAPSULE, EXTENDED RELEASE ORAL at 18:20

## 2023-06-10 RX ADMIN — LIDOCAINE 1 PATCH: 4 CREAM TOPICAL at 23:39

## 2023-06-10 RX ADMIN — Medication 7 UNIT(S): at 07:37

## 2023-06-10 RX ADMIN — INSULIN GLARGINE 3 UNIT(S): 100 INJECTION, SOLUTION SUBCUTANEOUS at 21:48

## 2023-06-10 RX ADMIN — LISINOPRIL 40 MILLIGRAM(S): 2.5 TABLET ORAL at 05:37

## 2023-06-10 RX ADMIN — SENNA PLUS 2 TABLET(S): 8.6 TABLET ORAL at 21:46

## 2023-06-10 RX ADMIN — CARVEDILOL PHOSPHATE 6.25 MILLIGRAM(S): 80 CAPSULE, EXTENDED RELEASE ORAL at 05:36

## 2023-06-10 RX ADMIN — ATORVASTATIN CALCIUM 80 MILLIGRAM(S): 80 TABLET, FILM COATED ORAL at 21:46

## 2023-06-10 NOTE — CONSULT NOTE ADULT - CONSULT REQUESTED DATE/TIME
02-Jun-2023 18:41
08-Jun-2023 12:28
09-Jun-2023 19:57
05-Jun-2023 07:35
10-Miky-2023 19:35
03-Jun-2023 10:32

## 2023-06-10 NOTE — PROGRESS NOTE ADULT - ASSESSMENT
63F with PMH of [place] Palsy, DM2, CVA (left side weak), CAD, HLD, HTN, Asthma noted to have acute R pontine stroke.  On stroke service with medical comanagment

## 2023-06-10 NOTE — CONSULT NOTE ADULT - CONSULT REASON
hypercoagulable work up
parotid lesion
Difficulty ambulating
azotemia
PM&R evaluation
glycemic control in setting of stroke

## 2023-06-10 NOTE — PROGRESS NOTE ADULT - PROBLEM SELECTOR PLAN 6
Pt with BUN/Cr of 31/1.70 on admission. UA showing proteinuria and glucosuria.   -renal ultrasound  - does have persistent azotemia, however improving on repeat labs today.  Has been noncompliant with BP meds, one of which is lisinopril, which is indicated in cases of CKD, which is very likely in her situation given poorly controlled hypertension and diabetes  - continue with lisinopril  - consideration of SGLT-2 inhibitor as outpatient, for renal protection Pt with BUN/Cr of 31/1.70 on admission. UA showing proteinuria and glucosuria.   -fluctuating creatinine - suspect may be at baseline  - continue with lisinopril  - consideration of SGLT-2 inhibitor as outpatient, for renal protection

## 2023-06-10 NOTE — PROGRESS NOTE ADULT - PROBLEM SELECTOR PLAN 3
- increased carvedilol to  6.25mg BID, lisinopril 40mg daily and amlodipine - increased carvedilol to  6.25mg BID, lisinopril 40mg daily and amlodipine  -if further BP control needed can increase coreg to 12.5 mg bid

## 2023-06-10 NOTE — PROGRESS NOTE ADULT - PROBLEM SELECTOR PLAN 4
continue with sliding scale  continue with 7U premeal, sliding scale and 5U of lantus.  - within goal of 140-180 continue with sliding scale  continue with 7U premeal, sliding scale and 3U of lantus.  - sugars < 180

## 2023-06-10 NOTE — PROGRESS NOTE ADULT - SUBJECTIVE AND OBJECTIVE BOX
OVERNIGHT EVENTS:    SUBJECTIVE / INTERVAL HPI: Patient seen and examined at bedside.     VITAL SIGNS:  Vital Signs Last 24 Hrs  T(C): 37 (10 Miky 2023 09:31), Max: 37.2 (09 Jun 2023 21:50)  T(F): 98.6 (10 Miky 2023 09:31), Max: 98.9 (09 Jun 2023 21:50)  HR: 60 (10 Miky 2023 09:01) (60 - 75)  BP: 147/64 (10 Miky 2023 09:01) (129/61 - 172/77)  BP(mean): 92 (10 Miky 2023 09:01) (88 - 114)  RR: 17 (10 Miky 2023 09:01) (17 - 18)  SpO2: 99% (10 Miky 2023 09:01) (94% - 99%)    Parameters below as of 10 Miky 2023 09:01  Patient On (Oxygen Delivery Method): room air        06-09-23 @ 07:01  -  06-10-23 @ 07:00  --------------------------------------------------------  IN: 425 mL / OUT: 625 mL / NET: -200 mL    06-10-23 @ 07:01  -  06-10-23 @ 11:59  --------------------------------------------------------  IN: 225 mL / OUT: 0 mL / NET: 225 mL        PHYSICAL EXAM:    General: WDWN  HEENT: NC/AT; PERRL, anicteric sclera; MMM  Neck: supple  Cardiovascular: +S1/S2; RRR  Respiratory: CTA B/L; no W/R/R  Gastrointestinal: soft, NT/ND; +BSx4  Extremities: WWP; no edema, clubbing or cyanosis  Vascular: 2+ radial, DP/PT pulses B/L  Neurological: AAOx3; no focal deficits    MEDICATIONS:  MEDICATIONS  (STANDING):  amLODIPine   Tablet 10 milliGRAM(s) Oral daily  aspirin enteric coated 81 milliGRAM(s) Oral daily  atorvastatin 80 milliGRAM(s) Oral at bedtime  carvedilol 6.25 milliGRAM(s) Oral every 12 hours  clopidogrel Tablet 75 milliGRAM(s) Oral daily  dextrose 5%. 1000 milliLiter(s) (100 mL/Hr) IV Continuous <Continuous>  dextrose 5%. 1000 milliLiter(s) (50 mL/Hr) IV Continuous <Continuous>  dextrose 50% Injectable 25 Gram(s) IV Push once  dextrose 50% Injectable 25 Gram(s) IV Push once  dextrose 50% Injectable 12.5 Gram(s) IV Push once  donepezil 5 milliGRAM(s) Oral at bedtime  glucagon  Injectable 1 milliGRAM(s) IntraMuscular once  heparin   Injectable 7500 Unit(s) SubCutaneous every 8 hours  insulin glargine Injectable (LANTUS) 3 Unit(s) SubCutaneous at bedtime  insulin lispro (ADMELOG) corrective regimen sliding scale   SubCutaneous Before meals and at bedtime  insulin lispro Injectable (ADMELOG) 7 Unit(s) SubCutaneous three times a day before meals  lidocaine   4% Patch 1 Patch Transdermal once  lisinopril 40 milliGRAM(s) Oral daily  polyethylene glycol 3350 17 Gram(s) Oral daily  senna 2 Tablet(s) Oral at bedtime    MEDICATIONS  (PRN):  acetaminophen     Tablet .. 650 milliGRAM(s) Oral every 6 hours PRN Temp greater or equal to 38C (100.4F), Moderate Pain (4 - 6)  dextrose Oral Gel 15 Gram(s) Oral once PRN Blood Glucose LESS THAN 70 milliGRAM(s)/deciliter      ALLERGIES:  Allergies    codeine (Unknown)    Intolerances        LABS:                        11.1   6.34  )-----------( 214      ( 10 Miky 2023 05:30 )             33.3     06-10    138  |  108  |  30<H>  ----------------------------<  76  4.6   |  22  |  1.69<H>    Ca    8.5      10 Miky 2023 05:30  Phos  3.7     06-10  Mg     2.0     06-10          CAPILLARY BLOOD GLUCOSE      POCT Blood Glucose.: 80 mg/dL (10 Miky 2023 11:56)        RADIOLOGY & ADDITIONAL TESTS: Reviewed.    ASSESSMENT:    PLAN:  OVERNIGHT EVENTS: none    SUBJECTIVE / INTERVAL HPI: Patient seen and examined at bedside. No new concerns/complaints    VITAL SIGNS:  Vital Signs Last 24 Hrs  T(C): 37 (10 Miky 2023 09:31), Max: 37.2 (09 Jun 2023 21:50)  T(F): 98.6 (10 Miky 2023 09:31), Max: 98.9 (09 Jun 2023 21:50)  HR: 60 (10 Miky 2023 09:01) (60 - 75)  BP: 147/64 (10 Miky 2023 09:01) (129/61 - 172/77)  BP(mean): 92 (10 Miky 2023 09:01) (88 - 114)  RR: 17 (10 Miky 2023 09:01) (17 - 18)  SpO2: 99% (10 Miky 2023 09:01) (94% - 99%)    Parameters below as of 10 Miky 2023 09:01  Patient On (Oxygen Delivery Method): room air        06-09-23 @ 07:01  -  06-10-23 @ 07:00  --------------------------------------------------------  IN: 425 mL / OUT: 625 mL / NET: -200 mL    06-10-23 @ 07:01  -  06-10-23 @ 11:59  --------------------------------------------------------  IN: 225 mL / OUT: 0 mL / NET: 225 mL        PHYSICAL EXAM:    General: sitting in bedside chair, comfortable  HEENT: NC/AT  Neck: supple  Cardiovascular: +S1/S2; RRR  Respiratory: CTA B/L; no W/R/R  Gastrointestinal: soft, NT/ND; +BSx4  Extremities: WWP; no edema, clubbing or cyanosis  Vascular: 2+ radial, DP/PT pulses B/L  Neurological: AAOx3; +left sided facial droop    MEDICATIONS:  MEDICATIONS  (STANDING):  amLODIPine   Tablet 10 milliGRAM(s) Oral daily  aspirin enteric coated 81 milliGRAM(s) Oral daily  atorvastatin 80 milliGRAM(s) Oral at bedtime  carvedilol 6.25 milliGRAM(s) Oral every 12 hours  clopidogrel Tablet 75 milliGRAM(s) Oral daily  dextrose 5%. 1000 milliLiter(s) (100 mL/Hr) IV Continuous <Continuous>  dextrose 5%. 1000 milliLiter(s) (50 mL/Hr) IV Continuous <Continuous>  dextrose 50% Injectable 25 Gram(s) IV Push once  dextrose 50% Injectable 25 Gram(s) IV Push once  dextrose 50% Injectable 12.5 Gram(s) IV Push once  donepezil 5 milliGRAM(s) Oral at bedtime  glucagon  Injectable 1 milliGRAM(s) IntraMuscular once  heparin   Injectable 7500 Unit(s) SubCutaneous every 8 hours  insulin glargine Injectable (LANTUS) 3 Unit(s) SubCutaneous at bedtime  insulin lispro (ADMELOG) corrective regimen sliding scale   SubCutaneous Before meals and at bedtime  insulin lispro Injectable (ADMELOG) 7 Unit(s) SubCutaneous three times a day before meals  lidocaine   4% Patch 1 Patch Transdermal once  lisinopril 40 milliGRAM(s) Oral daily  polyethylene glycol 3350 17 Gram(s) Oral daily  senna 2 Tablet(s) Oral at bedtime    MEDICATIONS  (PRN):  acetaminophen     Tablet .. 650 milliGRAM(s) Oral every 6 hours PRN Temp greater or equal to 38C (100.4F), Moderate Pain (4 - 6)  dextrose Oral Gel 15 Gram(s) Oral once PRN Blood Glucose LESS THAN 70 milliGRAM(s)/deciliter      ALLERGIES:  Allergies    codeine (Unknown)    Intolerances        LABS:                        11.1   6.34  )-----------( 214      ( 10 Miky 2023 05:30 )             33.3     06-10    138  |  108  |  30<H>  ----------------------------<  76  4.6   |  22  |  1.69<H>    Ca    8.5      10 Miky 2023 05:30  Phos  3.7     06-10  Mg     2.0     06-10          CAPILLARY BLOOD GLUCOSE      POCT Blood Glucose.: 80 mg/dL (10 Miky 2023 11:56)        RADIOLOGY & ADDITIONAL TESTS: Reviewed.    ASSESSMENT:    PLAN:

## 2023-06-10 NOTE — PROGRESS NOTE ADULT - ASSESSMENT
62 y/o F with pmhx of [place] Palsy (left side), T2DM, CVA 2022 (left-sided deficits), CAD, HLD, HTN, Asthma, current smoker with a 52 pack year hx, ?AFib reportedly compliant on Eliquis who presented for evaluation of generalized weakness. Weakness started 5/21, got progressively worse 2-3 days ago. Daughter also noticed patient had been falling with occasional episodes of urinary incontinence. CTH with small vessel disease, CT C-spine negative for acute fx. Admitted to medicine for further w/u. Gen neuro consulted, recommended MRI brain w/o. MRI brain: small focus of restricted diffusion in the R petra, left pontomedullary junction and right frontal periventricular white matter. Patient transferred to stroke tele for further management. Eliquis discontinued and started asa/plavix on 6/6 as no indicated for AC. IWONA this admission w/o thrombus. EP interrogated ILR - no arrythmias. Nephro consulted for worsening CKD, renal sono neg. Recommend can continue with current antihypertensive therapy and optimize blood glucose with endocrinology. Can consider initiating SGLT-2 Inhibitor outpatient i/s/o CKD w/ proteinuria. Endo following.        Neuro  #CVA workup  - d/c eliquis and started asa/plavix on 6/6. collateral obtained from outpt cards office: patient had a cryptogenic right thalamic stroke in 9/2019 and underwent loop recorder implant on 1/31/20 for said stroke. LR was checked in 8/2020 with no evidence of AF. 3/2021 patient admitted to [facility] for acute left basal ganglia and bilateral frontal lobe stroke. Loop recorder again showed no evidence of afib. Because stroke was suspicious for cardioembolic phenomenon the patient was started on apixaban. IWONA this admission w/o thrombus.  - continue atorvastatin 80mg daily  - q4hr stroke neuro checks and vitals  - MRI brain: small focus of restricted diffusion in the R petra, left pontomedullary junction and right frontal periventricular white matter  - Stroke Code HCT Results: small vessel disease  - MRA head without steno-occlusive disease, MRA neck without stenosis or occlusion  - B12: 447  - folate: 11.2  - kappa/lamda free light chain ratio: 1.66  - Stroke education  - EP interrogated ILR - no arrythmias  - f/u hypercoags  - PET: There is a 1 cm lesion in the left parotid gland with intense FDG uptake.  It should be noted that many benign tumors of the parotid gland can have intense FDG uptake.  - ENT consulted, no acute intervention plan to follow up outpatient for biopsy    #urinary incontinence, ? hyperreflexia r/o cord compression   - CT C-spine: Evaluation of the parotids glands demonstrates a approximately 11 mm well-demarcated lesion within the superficial lobe of the left parotid gland (series 5 image 25). The lesion demonstrates hyperintense signal on the T2-weighted images from the cervical MRI (series 12 image 3) and is incompletely evaluated. Precise histologic diagnosis will require tissue sampling. Primary considerations include enlarged parotid lymph node as well as primary parotid neoplasm such as benign mixed tumor or Warthin's tumor.  - PET: There is a 1 cm lesion in the left parotid gland with intense FDG uptake.  It should be noted that many benign tumors of the parotid gland can have intense FDGuptake.  - MRI C/T/L spine without cord compression    Cards  #HTN  - goal sBP 120-160, gradual normotension  - carvedilol increased to 6.25q12h on 6/9 due to BP above parameters, c/w Amlodipine 10mg. increased lisinopril from 20 to 40mg of 6/4  - can increase carvedilol if pressures continuously above 160. Will increased if BP remains elevated today  < from: Echocardiogram w/ Bubble and Doppler (06.06.23 @ 09:25) >     1. Mild symmetric left ventricular hypertrophy.   2. Low-normal left ventricular systolic function.   3. Normal right ventricular size and systolic function.   4. Normal atria.   5. Aortic sclerosis without significant stenosis.   6. No other significant valvular disease.   7. No evidence of pulmonary hypertension.   8. No pericardial effusion.   9. Injection of agitated saline via a peripheral vein reveals no   evidence of a right-to-left shunt.    < from: IWONA w/Doppler (06.06.23 @ 09:52) >   1. Normal left ventricular size and systolic function.   2. Normal right ventricular size and systolic function.   3. Mild tricuspid regurgitation.   4. No LA/RA/FRANCISCA/RAA thrombus seen.   5. There is an interatrial septal aneurysm. Minimal interatrial right to   left shunting noted predominantly with Valsalva suggestive of a tiny   patent foramen ovale.   6. There is severe non-mobile plaque seen in the visualized portion of   the descending aorta. There is severe non-mobile plaque seen in the   visualized portion of the aortic arch.   7. No pericardial effusion.    - Stroke Code EKG Results: NSR with L axis deviation and occasional Q waves but no active ischemic changes    #HLD  - high dose statin as above in CVA  - LDL results: 162    Pulm  - call provider if SPO2 < 94%    #asthma  - albuterol PRN    GI  #Nutrition/Fluids/Electrolytes   - replete K<4 and Mg <2  - Diet: Soft and bite sized  - MBS passed: soft and bite sized CC diet     Renal  #CKD   - Cr 1.7 on admission   - Will continue to trend  - collateral from PCP's office obtained:  5/2023 - Cr 1.57   9/2022 - Cr 1.38  6/2022 - Cr 1.34  - Nephro consulted for worsening CKD, renal sono neg. Recommend can continue with current antihypertensive therapy and optimize blood glucose with endocrinology. Can consider initiating SGLT-2 Inhibitor outpatient i/s/o CKD w/ proteinuria.    Infectious Disease  - Stroke Code CXR results: negative    Endocrine  #DM  - A1C results: 9.8  - endo following  Home regimen: 75/25 insulin 20 units QD, Metformin 1000mg BID and Rybelsus 3mg QD. She will likely not need insulin at discharge.  - Please continue lantus 5 units at bedtime.   - Continue lispro to 7 units before each meal.  - Continue lispro moderate dose sliding scale four times daily with meals and at bedtime.  - Patient's fingerstick glucose goal is 100-180 mg/dL.    - For discharge, patient can continue metformin 1000mg BID and rybelsus 3mg daily. Stop insulin. Freestyle Ted sent to [facility] and covered. Delivered to bedside. Reviewed how to apply with daughter and patient. Did not actually apply because it would need to removed for planned PET. Will apply right before discharge.  - Patient to follow up with physician's in [place], has new endocrine appointment.    ·  Problem: Thyroid nodule.   ·  Plan: There is 2.2 x 1.9 x 1.6 cm heterogeneous predominantly isoechoic nodule in the lower pole of right lobe.  Additional multiple spongiform benign-appearing thyroid nodules in left lobe  Repeat US in 6 months to determine if FNA is needed due to size >2cm. Discussed with daughter.  TFTs normal.    Heme  - f/u hypercoag panel  - + Beta 2 Glycoprotein antibody screen    DVT Prophylaxis  - heparin + SCDs  - dopplers: neg    Dispo: rec'd for home PT/OT but wants to go to rehab  MOCA 8/30      Discussed daily hospital plans and goals with patient    Discussed with Dr. Eli 62 y/o F with pmhx of [place] Palsy (left side), T2DM, CVA 2022 (left-sided deficits), CAD, HLD, HTN, Asthma, current smoker with a 52 pack year hx, ?AFib reportedly compliant on Eliquis who presented for evaluation of generalized weakness. Weakness started 5/21, got progressively worse 2-3 days ago. Daughter also noticed patient had been falling with occasional episodes of urinary incontinence. CTH with small vessel disease, CT C-spine negative for acute fx. Admitted to medicine for further w/u. Gen neuro consulted, recommended MRI brain w/o. MRI brain: small focus of restricted diffusion in the R petra, left pontomedullary junction and right frontal periventricular white matter. Patient transferred to stroke tele for further management. Eliquis discontinued and started asa/plavix on 6/6 as no indicated for AC. IWONA this admission w/o thrombus. EP interrogated ILR - no arrythmias. Nephro consulted for worsening CKD, renal sono neg. Recommend can continue with current antihypertensive therapy and optimize blood glucose with endocrinology. Can consider initiating SGLT-2 Inhibitor outpatient i/s/o CKD w/ proteinuria. Endo following.        Neuro  #CVA workup  - d/c eliquis and started asa/plavix on 6/6. collateral obtained from outpt cards office: patient had a cryptogenic right thalamic stroke in 9/2019 and underwent loop recorder implant on 1/31/20 for said stroke. LR was checked in 8/2020 with no evidence of AF. 3/2021 patient admitted to [facility] for acute left basal ganglia and bilateral frontal lobe stroke. Loop recorder again showed no evidence of afib. Because stroke was suspicious for cardioembolic phenomenon the patient was started on apixaban. IWONA this admission w/o thrombus.  - continue atorvastatin 80mg daily  - q4hr stroke neuro checks and vitals  - MRI brain: small focus of restricted diffusion in the R petra, left pontomedullary junction and right frontal periventricular white matter  - Stroke Code HCT Results: small vessel disease  - MRA head without steno-occlusive disease, MRA neck without stenosis or occlusion  - B12: 447  - folate: 11.2  - kappa/lamda free light chain ratio: 1.66  - Stroke education  - EP interrogated ILR - no arrythmias  - f/u hypercoags  - PET: There is a 1 cm lesion in the left parotid gland with intense FDG uptake.  It should be noted that many benign tumors of the parotid gland can have intense FDG uptake.  - ENT consulted, no acute intervention plan to follow up outpatient for biopsy    #Cognitive impairment  -8/30 on MOCA done at bedside  - Start donepezil 5mg at bedtime    #urinary incontinence, ? hyperreflexia r/o cord compression   - CT C-spine: Evaluation of the parotids glands demonstrates a approximately 11 mm well-demarcated lesion within the superficial lobe of the left parotid gland (series 5 image 25). The lesion demonstrates hyperintense signal on the T2-weighted images from the cervical MRI (series 12 image 3) and is incompletely evaluated. Precise histologic diagnosis will require tissue sampling. Primary considerations include enlarged parotid lymph node as well as primary parotid neoplasm such as benign mixed tumor or Warthin's tumor.  - PET: There is a 1 cm lesion in the left parotid gland with intense FDG uptake.  It should be noted that many benign tumors of the parotid gland can have intense FDGuptake.  - MRI C/T/L spine without cord compression    Cards  #HTN  - goal sBP 120-160, gradual normotension  - carvedilol increased to 6.25q12h on 6/9 due to BP above parameters, c/w Amlodipine 10mg. increased lisinopril from 20 to 40mg of 6/4  - can increase carvedilol if pressures continuously above 160. Will increase if BP remains elevated today  < from: Echocardiogram w/ Bubble and Doppler (06.06.23 @ 09:25) >     1. Mild symmetric left ventricular hypertrophy.   2. Low-normal left ventricular systolic function.   3. Normal right ventricular size and systolic function.   4. Normal atria.   5. Aortic sclerosis without significant stenosis.   6. No other significant valvular disease.   7. No evidence of pulmonary hypertension.   8. No pericardial effusion.   9. Injection of agitated saline via a peripheral vein reveals no   evidence of a right-to-left shunt.    < from: IWONA w/Doppler (06.06.23 @ 09:52) >   1. Normal left ventricular size and systolic function.   2. Normal right ventricular size and systolic function.   3. Mild tricuspid regurgitation.   4. No LA/RA/FRANCISCA/RAA thrombus seen.   5. There is an interatrial septal aneurysm. Minimal interatrial right to   left shunting noted predominantly with Valsalva suggestive of a tiny   patent foramen ovale.   6. There is severe non-mobile plaque seen in the visualized portion of   the descending aorta. There is severe non-mobile plaque seen in the   visualized portion of the aortic arch.   7. No pericardial effusion.    - Stroke Code EKG Results: NSR with L axis deviation and occasional Q waves but no active ischemic changes    #HLD  - high dose statin as above in CVA  - LDL results: 162    Pulm  - call provider if SPO2 < 94%    #asthma  - albuterol PRN    GI  #Nutrition/Fluids/Electrolytes   - replete K<4 and Mg <2  - Diet: Soft and bite sized  - MBS passed: soft and bite sized CC diet     Renal  #CKD   - Cr 1.7 on admission   - Will continue to trend  - collateral from PCP's office obtained:  5/2023 - Cr 1.57   9/2022 - Cr 1.38  6/2022 - Cr 1.34  - Nephro consulted for worsening CKD, renal sono neg. Recommend can continue with current antihypertensive therapy and optimize blood glucose with endocrinology. Can consider initiating SGLT-2 Inhibitor outpatient i/s/o CKD w/ proteinuria.    Infectious Disease  - Stroke Code CXR results: negative    Endocrine  #DM  - A1C results: 9.8  - endo following  Home regimen: 75/25 insulin 20 units QD, Metformin 1000mg BID and Rybelsus 3mg QD. She will likely not need insulin at discharge.  - Please continue lantus 5 units at bedtime.   - Continue lispro to 7 units before each meal.  - Continue lispro moderate dose sliding scale four times daily with meals and at bedtime.  - Patient's fingerstick glucose goal is 100-180 mg/dL.    - For discharge, patient can continue metformin 1000mg BID and rybelsus 3mg daily. Stop insulin. Freestyle Ted sent to [facility] and covered. Delivered to bedside. Reviewed how to apply with daughter and patient. Did not actually apply because it would need to removed for planned PET. Will apply right before discharge.  - Patient to follow up with physician's in [place], has new endocrine appointment.    ·  Problem: Thyroid nodule.   ·  Plan: There is 2.2 x 1.9 x 1.6 cm heterogeneous predominantly isoechoic nodule in the lower pole of right lobe.  Additional multiple spongiform benign-appearing thyroid nodules in left lobe  Repeat US in 6 months to determine if FNA is needed due to size >2cm. Discussed with daughter.  TFTs normal.    Heme  - f/u hypercoag panel may not be meaningful in the setting of acute stroke with Eliquis use outpatient and DVT prophylaxis inpatient, plan to follow up titers and continue with current management for now  - + Beta 2 Glycoprotein antibody screen, f/u titers  - + Anticardiolipin antibody screen, f/u titers  - Protein S low (25)  - Protein C resistance ratio low  - Hematology consulted f/u recommendations    DVT Prophylaxis  - heparin + SCDs  - dopplers: neg    Dispo: rec'd for home PT/OT but wants to go to rehab  MOCA 8/30, started donepezil     Discussed daily hospital plans and goals with patient    Discussed with Dr. Eli and Dr. Martinez

## 2023-06-10 NOTE — PROGRESS NOTE ADULT - PROBLEM SELECTOR PLAN 7
F: Tolerating oral   GI: None  DVT: lovenox  Code: Full code  Dispo: [facility]    >35 minutes spent on this encounter, including face to face with patient, care coordination and documentation. F: Tolerating oral   GI: None  DVT: lovenox  Code: Full code  Dispo: home with home PT

## 2023-06-10 NOTE — CONSULT NOTE ADULT - SUBJECTIVE AND OBJECTIVE BOX
Hematology Oncology Consult Note      HPI:  Ms. Hendricks is a 62 y/o F with a PMHx of [place] Palsy (on the left side), TIIDM, CVA (left side weak), CAD, HLD, HTN, Asthma, current smoker with a 52 pack year hx, brought for evaluation of worsening generalized weakness since 5/21. Pt states she has had a CVA in the past with residual L sided weakness in her leg and arm, however, pt began to notice continual weakness in both her upper and lower extremities. Pt states she was taken to an ER in [place] for pain in her L ankle and was d/c after no DVT was discovered. Since then, pt states she lost her balance upon getting out of bed 2x and presented to ER at [facility] 6/2 for further evaluation. Pt states she has had increasingly difficulty in using her wrists and ankles. Pt denies diarrheal illness previously, denies SOB and cough, palpitations and CP, headaches, fevers, chills, nausea, vomiting, diarrhea, constipation, dysuria, hematuria, hematochezia. Pt noted to have had some urinary hesitancy. Daughter Rosemarie at bedside [phone], states her mother has poor compliance with her medications, states she has mostly sedentary lifestyle.     ED Course:  ED Vitals: Initial: T 97.8, HR 70, /82, satting 99% on RA   Notable labs: WBC 7.98, Hgb/Hct 12.7/37.2, platelets 242; Na 137, K 4.7, Cl 105, CO2 24, BUN/Cr 31/1.70, Glucose 360, troponin 0.01   UA: Protein, small blood, RBC, bacteria, + Glucose   CXR: No evidence of acute cardiopulmonary disease  CTH: No acute intracranial hemorrhage, mass effect, or demarcated recent infarction, small vessel ischemic change throughout cerebral white matter and deep gray/white matter lacunae.  CT Cervical Spine: No fracture   EKG: NSR with L axis deviation and occasional Q waves but no active ischemic changes   Pt was admitted to [facility] for further workup of generalized weakness (02 Jun 2023 17:54)        SUBJECTIVE: Patient seen and examined at bedside. Still feels weak overall, but able to walk around. Eating and drinking. Denies fever, headache, nausea, vomiting, chest pain, shortness of breath, abdominal pain, changes in bowel movements or urination.     OBJECTIVE:    VITAL SIGNS:  ICU Vital Signs Last 24 Hrs  T(C): 36.7 (10 Miky 2023 17:30), Max: 37.2 (09 Jun 2023 21:50)  T(F): 98 (10 Miky 2023 17:30), Max: 98.9 (09 Jun 2023 21:50)  HR: 66 (10 Miky 2023 18:08) (60 - 68)  BP: 177/78 (10 Mkiy 2023 18:08) (129/61 - 177/78)  BP(mean): 112 (10 Miky 2023 18:08) (88 - 112)  ABP: --  ABP(mean): --  RR: 19 (10 Miky 2023 18:08) (17 - 19)  SpO2: 99% (10 Miky 2023 18:08) (94% - 100%)    O2 Parameters below as of 10 Miky 2023 18:08  Patient On (Oxygen Delivery Method): room air              06-09 @ 07:01  -  06-10 @ 07:00  --------------------------------------------------------  IN: 425 mL / OUT: 625 mL / NET: -200 mL    06-10 @ 07:01  -  06-10 @ 19:35  --------------------------------------------------------  IN: 1097 mL / OUT: 0 mL / NET: 1097 mL      CAPILLARY BLOOD GLUCOSE      POCT Blood Glucose.: 95 mg/dL (10 Miky 2023 16:32)      PHYSICAL EXAM:  General: NAD, older than stated age, overweight  HEENT: NC/AT; PERRL, clear conjunctiva  Neck: supple  Respiratory: CTA b/l  Cardiovascular: +S1/S2; RRR  Abdomen: soft, NT/ND; +BS x4  Extremities: WWP, 2+ peripheral pulses b/l; no LE edema  Skin: normal color and turgor; no rash  Neurological: generalized weakness 4/5 motor, slight left facial flattening, AAOX3    MEDICATIONS:  MEDICATIONS  (STANDING):  amLODIPine   Tablet 10 milliGRAM(s) Oral daily  aspirin enteric coated 81 milliGRAM(s) Oral daily  atorvastatin 80 milliGRAM(s) Oral at bedtime  clopidogrel Tablet 75 milliGRAM(s) Oral daily  dextrose 5%. 1000 milliLiter(s) (100 mL/Hr) IV Continuous <Continuous>  dextrose 5%. 1000 milliLiter(s) (50 mL/Hr) IV Continuous <Continuous>  dextrose 50% Injectable 25 Gram(s) IV Push once  dextrose 50% Injectable 25 Gram(s) IV Push once  dextrose 50% Injectable 12.5 Gram(s) IV Push once  donepezil 5 milliGRAM(s) Oral at bedtime  glucagon  Injectable 1 milliGRAM(s) IntraMuscular once  heparin   Injectable 7500 Unit(s) SubCutaneous every 8 hours  insulin glargine Injectable (LANTUS) 3 Unit(s) SubCutaneous at bedtime  insulin lispro (ADMELOG) corrective regimen sliding scale   SubCutaneous Before meals and at bedtime  insulin lispro Injectable (ADMELOG) 5 Unit(s) SubCutaneous three times a day before meals  lisinopril 40 milliGRAM(s) Oral daily  polyethylene glycol 3350 17 Gram(s) Oral daily  senna 2 Tablet(s) Oral at bedtime    MEDICATIONS  (PRN):  acetaminophen     Tablet .. 650 milliGRAM(s) Oral every 6 hours PRN Temp greater or equal to 38C (100.4F), Moderate Pain (4 - 6)  dextrose Oral Gel 15 Gram(s) Oral once PRN Blood Glucose LESS THAN 70 milliGRAM(s)/deciliter      ALLERGIES:  Allergies    codeine (Unknown)    Intolerances        LABS:                        11.1   6.34  )-----------( 214      ( 10 Miky 2023 05:30 )             33.3     06-10    138  |  108  |  30<H>  ----------------------------<  76  4.6   |  22  |  1.69<H>    Ca    8.5      10 Miky 2023 05:30  Phos  3.7     06-10  Mg     2.0     06-10                      RADIOLOGY & ADDITIONAL TESTS: Reviewed.

## 2023-06-10 NOTE — PROGRESS NOTE ADULT - SUBJECTIVE AND OBJECTIVE BOX
SUBJECTIVE / INTERVAL HPI: Patient was seen and examined this morning.     CAPILLARY BLOOD GLUCOSE & INSULIN RECEIVED  93 mg/dL (06-09 @ 06:26) ? 7 units of lispro.   154 mg/dL (06-09 @ 11:23) ? 7 units of lispro + 2 units of lispro sliding scale.   91 mg/dL (06-09 @ 16:32) ? 7 units of lispro.   105 mg/dL (06-09 @ 21:00) ? 3 units of lantus.   96 mg/dL (06-10 @ 07:34) ? 7 units of lispro.   80 mg/dL (06-10 @ 11:56) ? Ø    REVIEW OF SYSTEMS  Constitutional:  Negative fever, chills or loss of appetite.  Eyes:  Negative blurry vision or double vision.  Cardiovascular:  Negative for chest pain or palpitations.  Respiratory:  Negative for cough, wheezing, or shortness of breath.    Gastrointestinal:  Negative for nausea, vomiting, diarrhea, constipation, or abdominal pain.  Genitourinary:  Negative frequency, urgency or dysuria.  Neurologic:  No headache, confusion, dizziness, lightheadedness. + weakness    PHYSICAL EXAM  Vital Signs Last 24 Hrs  T(C): 37 (10 Miky 2023 09:31), Max: 37.2 (09 Jun 2023 21:50)  T(F): 98.6 (10 Miky 2023 09:31), Max: 98.9 (09 Jun 2023 21:50)  HR: 60 (10 Miky 2023 12:16) (60 - 75)  BP: 166/75 (10 Miky 2023 12:16) (129/61 - 170/74)  BP(mean): 108 (10 Miky 2023 12:16) (88 - 114)  RR: 18 (10 Miky 2023 12:16) (17 - 18)  SpO2: 100% (10 Miky 2023 12:16) (94% - 100%)    Parameters below as of 10 Miky 2023 12:16  Patient On (Oxygen Delivery Method): room air    Constitutional: Awake, alert, in no acute distress.   HEENT: Normocephalic, atraumatic, ANDREA.  Respiratory: Lungs clear to ausculation bilaterally.   Cardiovascular: regular rhythm, normal S1 and S2, no audible murmurs.   GI: soft, non-tender, non-distended, bowel sounds present.  Extremities: No lower extremity edema.  Psychiatric: AAO x 3. Normal affect/mood.     LABS  CBC - WBC/HGB/HTC/PLT: 6.34/11.1/33.3/214 (06-10-23)  BMP - Na/K/Cl/Bicarb/BUN/Cr/Gluc/AG/eGFR: 138/4.6/108/22/30/1.69/76/8/33 (06-10-23)  Ca - 8.5 (06-10-23)  Phos - 3.7 (06-10-23)  Mg - 2.0 (06-10-23)  Thyroid Stimulating Hormone, Serum: 1.670 (06-04-23)  Total T4/Free T4: --/1.150 (06-04-23)    MEDICATIONS  MEDICATIONS  (STANDING):  amLODIPine   Tablet 10 milliGRAM(s) Oral daily  aspirin enteric coated 81 milliGRAM(s) Oral daily  atorvastatin 80 milliGRAM(s) Oral at bedtime  carvedilol 6.25 milliGRAM(s) Oral every 12 hours  clopidogrel Tablet 75 milliGRAM(s) Oral daily  dextrose 5%. 1000 milliLiter(s) (100 mL/Hr) IV Continuous <Continuous>  dextrose 5%. 1000 milliLiter(s) (50 mL/Hr) IV Continuous <Continuous>  dextrose 50% Injectable 25 Gram(s) IV Push once  dextrose 50% Injectable 25 Gram(s) IV Push once  dextrose 50% Injectable 12.5 Gram(s) IV Push once  donepezil 5 milliGRAM(s) Oral at bedtime  glucagon  Injectable 1 milliGRAM(s) IntraMuscular once  heparin   Injectable 7500 Unit(s) SubCutaneous every 8 hours  insulin glargine Injectable (LANTUS) 3 Unit(s) SubCutaneous at bedtime  insulin lispro (ADMELOG) corrective regimen sliding scale   SubCutaneous Before meals and at bedtime  insulin lispro Injectable (ADMELOG) 7 Unit(s) SubCutaneous three times a day before meals  lisinopril 40 milliGRAM(s) Oral daily  polyethylene glycol 3350 17 Gram(s) Oral daily  senna 2 Tablet(s) Oral at bedtime    MEDICATIONS  (PRN):  acetaminophen     Tablet .. 650 milliGRAM(s) Oral every 6 hours PRN Temp greater or equal to 38C (100.4F), Moderate Pain (4 - 6)  dextrose Oral Gel 15 Gram(s) Oral once PRN Blood Glucose LESS THAN 70 milliGRAM(s)/deciliter    ASSESSMENT / RECOMMENDATIONS  Ms. Hendricks is a 63-year-old female with a past medical history of type 2 diabetes mellitus, hypertension, hyperlipidemia, Bell's Palsy (left-sided), CVA (2022 with left-sided residual deficits), coronary artery disease, atrial fibrillation and asthma who presented for evaluation of generalized weakness. She was found to have right pontine infarct. Endocrinology has been following for diabetes management.    A1C: 9.8 %  BUN: 30  Creatinine: 1.69  GFR: 33  Weight: 96.4  BMI: 41.5    # Type 2 diabetes mellitus with hyperglycemia  - Please continue lantus *** units at bedtime.   - Continue lispro *** units before each meal.  - Continue lispro moderate / low dose sliding scale before meals and at bedtime.  - Patient's fingerstick glucose goal is 100-180 mg/dL.    - Discharge recommendations to be discussed.   - Patient can follow up at discharge with [facility] by calling [phone] to make an appointment.      Case discussed with Dr. Cary. Primary team updated.       Jose Elias Curiel    Endocrinology Fellow    Service Pager: [phone]    SUBJECTIVE / INTERVAL HPI: Patient was seen and examined this morning. She reports eating most of her meals. Nonetheless, blood glucose levels have been too tightly controlled since yesterday.     CAPILLARY BLOOD GLUCOSE & INSULIN RECEIVED  93 mg/dL (06-09 @ 06:26) ? 7 units of lispro.   154 mg/dL (06-09 @ 11:23) ? 7 units of lispro + 2 units of lispro sliding scale.   91 mg/dL (06-09 @ 16:32) ? 7 units of lispro.   105 mg/dL (06-09 @ 21:00) ? 3 units of lantus.   96 mg/dL (06-10 @ 07:34) ? 7 units of lispro.   80 mg/dL (06-10 @ 11:56) ? Ø    REVIEW OF SYSTEMS  Constitutional:  Negative fever, chills or loss of appetite.  Cardiovascular:  Negative for chest pain or palpitations.  Respiratory:  Negative for cough, wheezing, or shortness of breath.    Gastrointestinal:  Negative for nausea, vomiting, diarrhea, constipation, or abdominal pain.  Neurologic:  No headache, confusion, dizziness, lightheadedness. (+) Weakness    PHYSICAL EXAM  Vital Signs Last 24 Hrs  T(C): 37 (10 Miky 2023 09:31), Max: 37.2 (09 Jun 2023 21:50)  T(F): 98.6 (10 Miky 2023 09:31), Max: 98.9 (09 Jun 2023 21:50)  HR: 60 (10 Miky 2023 12:16) (60 - 75)  BP: 166/75 (10 Miky 2023 12:16) (129/61 - 170/74)  BP(mean): 108 (10 Miky 2023 12:16) (88 - 114)  RR: 18 (10 Miky 2023 12:16) (17 - 18)  SpO2: 100% (10 Mkiy 2023 12:16) (94% - 100%)    Parameters below as of 10 Miky 2023 12:16  Patient On (Oxygen Delivery Method): room air    Constitutional: Awake, alert, in no acute distress.   HEENT: Normocephalic, atraumatic, ANDREA.  Respiratory: Lungs clear to ausculation bilaterally.   Cardiovascular: regular rhythm, normal S1 and S2, no audible murmurs.   GI: soft, non-tender, non-distended, bowel sounds present.  Extremities: No lower extremity edema.  Psychiatric: AAO x 3. Normal affect/mood.     LABS  CBC - WBC/HGB/HTC/PLT: 6.34/11.1/33.3/214 (06-10-23)  BMP - Na/K/Cl/Bicarb/BUN/Cr/Gluc/AG/eGFR: 138/4.6/108/22/30/1.69/76/8/33 (06-10-23)  Ca - 8.5 (06-10-23)  Phos - 3.7 (06-10-23)  Mg - 2.0 (06-10-23)  Thyroid Stimulating Hormone, Serum: 1.670 (06-04-23)  Total T4/Free T4: --/1.150 (06-04-23)    MEDICATIONS  MEDICATIONS  (STANDING):  amLODIPine   Tablet 10 milliGRAM(s) Oral daily  aspirin enteric coated 81 milliGRAM(s) Oral daily  atorvastatin 80 milliGRAM(s) Oral at bedtime  carvedilol 6.25 milliGRAM(s) Oral every 12 hours  clopidogrel Tablet 75 milliGRAM(s) Oral daily  dextrose 5%. 1000 milliLiter(s) (100 mL/Hr) IV Continuous <Continuous>  dextrose 5%. 1000 milliLiter(s) (50 mL/Hr) IV Continuous <Continuous>  dextrose 50% Injectable 25 Gram(s) IV Push once  dextrose 50% Injectable 25 Gram(s) IV Push once  dextrose 50% Injectable 12.5 Gram(s) IV Push once  donepezil 5 milliGRAM(s) Oral at bedtime  glucagon  Injectable 1 milliGRAM(s) IntraMuscular once  heparin   Injectable 7500 Unit(s) SubCutaneous every 8 hours  insulin glargine Injectable (LANTUS) 3 Unit(s) SubCutaneous at bedtime  insulin lispro (ADMELOG) corrective regimen sliding scale   SubCutaneous Before meals and at bedtime  insulin lispro Injectable (ADMELOG) 7 Unit(s) SubCutaneous three times a day before meals  lisinopril 40 milliGRAM(s) Oral daily  polyethylene glycol 3350 17 Gram(s) Oral daily  senna 2 Tablet(s) Oral at bedtime    MEDICATIONS  (PRN):  acetaminophen     Tablet .. 650 milliGRAM(s) Oral every 6 hours PRN Temp greater or equal to 38C (100.4F), Moderate Pain (4 - 6)  dextrose Oral Gel 15 Gram(s) Oral once PRN Blood Glucose LESS THAN 70 milliGRAM(s)/deciliter    ASSESSMENT / RECOMMENDATIONS  Ms. Hendricks is a 63-year-old female with a past medical history of type 2 diabetes mellitus, hypertension, hyperlipidemia, Bell's Palsy (left-sided), CVA (2022 with left-sided residual deficits), coronary artery disease, atrial fibrillation and asthma who presented for evaluation of generalized weakness. She was found to have right pontine infarct. Endocrinology has been following for diabetes management.    A1C: 9.8 %  BUN: 30  Creatinine: 1.69  GFR: 33  Weight: 96.4  BMI: 41.5    # Type 2 diabetes mellitus with hyperglycemia  - Please continue lantus 3 units at bedtime.   - DECREASE lispro to 5 units before each meal.  - Continue lispro moderate dose sliding scale before meals and at bedtime.  - Patient's fingerstick glucose goal is 100-180 mg/dL.    - Discharge recommendations to be discussed.   - Patient can follow up at discharge with [facility] by calling [phone] to make an appointment.      Case discussed with Dr. Cary. Primary team updated.       Jose Elias Curiel    Endocrinology Fellow    Service Pager: [phone]    SUBJECTIVE / INTERVAL HPI: Patient was seen and examined this morning. She reports eating most of her meals. Nonetheless, blood glucose levels have been too tightly controlled since yesterday.     CAPILLARY BLOOD GLUCOSE & INSULIN RECEIVED  93 mg/dL (06-09 @ 06:26) ? 7 units of lispro.   154 mg/dL (06-09 @ 11:23) ? 7 units of lispro + 2 units of lispro sliding scale.   91 mg/dL (06-09 @ 16:32) ? 7 units of lispro.   105 mg/dL (06-09 @ 21:00) ? 3 units of lantus.   96 mg/dL (06-10 @ 07:34) ? 7 units of lispro.   80 mg/dL (06-10 @ 11:56) ? Ø    REVIEW OF SYSTEMS  Constitutional:  Negative fever, chills or loss of appetite.  Cardiovascular:  Negative for chest pain or palpitations.  Respiratory:  Negative for cough, wheezing, or shortness of breath.    Gastrointestinal:  Negative for nausea, vomiting, diarrhea, constipation, or abdominal pain.  Neurologic:  No headache, confusion, dizziness, lightheadedness. (+) Weakness    PHYSICAL EXAM  Vital Signs Last 24 Hrs  T(C): 37 (10 Miky 2023 09:31), Max: 37.2 (09 Jun 2023 21:50)  T(F): 98.6 (10 Miky 2023 09:31), Max: 98.9 (09 Jun 2023 21:50)  HR: 60 (10 Miky 2023 12:16) (60 - 75)  BP: 166/75 (10 Miky 2023 12:16) (129/61 - 170/74)  BP(mean): 108 (10 Miky 2023 12:16) (88 - 114)  RR: 18 (10 Miky 2023 12:16) (17 - 18)  SpO2: 100% (10 Miky 2023 12:16) (94% - 100%)    Parameters below as of 10 Miky 2023 12:16  Patient On (Oxygen Delivery Method): room air    Constitutional: Awake, alert, in no acute distress.   HEENT: Normocephalic, atraumatic, ANDREA.  Respiratory: Lungs clear to ausculation bilaterally.   Cardiovascular: regular rhythm, normal S1 and S2, no audible murmurs.   GI: soft, non-tender, non-distended, bowel sounds present.  Extremities: No lower extremity edema.  Psychiatric: AAO x 3. Normal affect/mood.     LABS  CBC - WBC/HGB/HTC/PLT: 6.34/11.1/33.3/214 (06-10-23)  BMP - Na/K/Cl/Bicarb/BUN/Cr/Gluc/AG/eGFR: 138/4.6/108/22/30/1.69/76/8/33 (06-10-23)  Ca - 8.5 (06-10-23)  Phos - 3.7 (06-10-23)  Mg - 2.0 (06-10-23)  Thyroid Stimulating Hormone, Serum: 1.670 (06-04-23)  Total T4/Free T4: --/1.150 (06-04-23)    MEDICATIONS  MEDICATIONS  (STANDING):  amLODIPine   Tablet 10 milliGRAM(s) Oral daily  aspirin enteric coated 81 milliGRAM(s) Oral daily  atorvastatin 80 milliGRAM(s) Oral at bedtime  carvedilol 6.25 milliGRAM(s) Oral every 12 hours  clopidogrel Tablet 75 milliGRAM(s) Oral daily  dextrose 5%. 1000 milliLiter(s) (100 mL/Hr) IV Continuous <Continuous>  dextrose 5%. 1000 milliLiter(s) (50 mL/Hr) IV Continuous <Continuous>  dextrose 50% Injectable 25 Gram(s) IV Push once  dextrose 50% Injectable 25 Gram(s) IV Push once  dextrose 50% Injectable 12.5 Gram(s) IV Push once  donepezil 5 milliGRAM(s) Oral at bedtime  glucagon  Injectable 1 milliGRAM(s) IntraMuscular once  heparin   Injectable 7500 Unit(s) SubCutaneous every 8 hours  insulin glargine Injectable (LANTUS) 3 Unit(s) SubCutaneous at bedtime  insulin lispro (ADMELOG) corrective regimen sliding scale   SubCutaneous Before meals and at bedtime  insulin lispro Injectable (ADMELOG) 7 Unit(s) SubCutaneous three times a day before meals  lisinopril 40 milliGRAM(s) Oral daily  polyethylene glycol 3350 17 Gram(s) Oral daily  senna 2 Tablet(s) Oral at bedtime    MEDICATIONS  (PRN):  acetaminophen     Tablet .. 650 milliGRAM(s) Oral every 6 hours PRN Temp greater or equal to 38C (100.4F), Moderate Pain (4 - 6)  dextrose Oral Gel 15 Gram(s) Oral once PRN Blood Glucose LESS THAN 70 milliGRAM(s)/deciliter    ASSESSMENT / RECOMMENDATIONS  Ms. Hendricks is a 63-year-old female with a past medical history of type 2 diabetes mellitus, hypertension, hyperlipidemia, Bell's Palsy (left-sided), CVA (2022 with left-sided residual deficits), coronary artery disease, atrial fibrillation and asthma who presented for evaluation of generalized weakness. She was found to have right pontine infarct. Endocrinology has been following for diabetes management.    A1C: 9.8 %  BUN: 30  Creatinine: 1.69  GFR: 33  Weight: 96.4  BMI: 41.5    # Type 2 diabetes mellitus with hyperglycemia  - Please continue lantus 3 units at bedtime.   - DECREASE lispro to 5 units before each meal.  - Continue lispro moderate dose sliding scale before meals and at bedtime.  - Patient's fingerstick glucose goal is 100-180 mg/dL.    - Discharge recommendations to be discussed.   - Patient can follow up at discharge with [facility] by calling [phone] to make an appointment.      Case discussed with Dr. Go. Primary team updated.       Jose Elias Curiel    Endocrinology Fellow    Service Pager: [phone]

## 2023-06-10 NOTE — CONSULT NOTE ADULT - ASSESSMENT
63 F with PMH of [place] Palsy, DM2, CVA (left side weak), CAD, HLD, HTN, Asthma noted to have acute R pontine stroke. Hematology consulted for hypercoagulable work up.    # Recurrent CVA  Patient with recurrent CVA. Current smoker with a 52 pack year hx, ?AFib reportedly compliant on Eliquis. Patient has not had cancer screening in past several years (unknown colonoscopy, mammogram 20 years ago). Obesity and smoking history provide risk factors for VTE development. Other than prior stroke, no prior episodes of VTE. No family history of VTE. No history of cancers in the family. Denies hormone use. Primary stroke team ordered PET/CT which showed a 1 cm lesion in the left parotid gland with intense FDG uptake. Hypercoagulable studies were ordered by primary stroke team which show normal AT III level, Protein C 99, Prostein S free acvitiy low 25. Of note, Protein S levels can appear lowered in an acute thrombosis setting and cannot be measured if patient on DOAC. Regarding APLS work up: Anticardiolipin and Beta 2 Glycoprotein antibody screens are positive, but IgM, IgG, and IgA titers have yet to result. Lupus anticoagulant not checked, but do not usually measure  in presence of heparin or DOAC therapy, as it is a clot based assay, compared to the serologic assays for [id number] and anticardiolipin. Prothrombin gene mutation and Factor V Leiden are pending.     Plan:   - Please check PT, PTT, INR in AM  - follow up Prothrombin gene mutation and Factor V Leiden  - Would continue with DVT ppx with heparin subQ for now.   - If APLS titers are elevated and no contraindication to anticoagulation, patient should receive therapeutic heparin infusion or enoxaparin with bridge to coumadin.    Will follow. Discussed with hematology oncology attending Dr. Tayla Emanuel. Recommendations are considered final after attending attestation.

## 2023-06-10 NOTE — PROGRESS NOTE ADULT - PROBLEM SELECTOR PLAN 1
MRI with short stroke protocol revealed acute ischemia in the right petra.  - eliquis discontinued - no DVT on dopplers and no cardiac thrombus  - continue on dual antiplatelet therapy MRI with short stroke protocol revealed acute ischemia in the right petra.  - eliquis discontinued - no DVT on dopplers and no cardiac thrombus  - continue on dual antiplatelet therapy    #coagulability abnormalities   -low protein S free activity assay and activated protein C resistance ratio as well as + anticardiolipin, [id number]  -heme consult today for further recs pending    #left parotid mass -- abnormal uptake on PET - may be benign or malignant, outpatient ENT f/u with Dr. Ortega for FNA

## 2023-06-10 NOTE — PROGRESS NOTE ADULT - ASSESSMENT
{\rtf1\votjwx30244\ansi\moitjsr9407\ftnbj\uc1\deff0  {\fonttbl{\f0 \fnil Segoe UI;}{\f1 \fnil \fcharset0 Segoe UI;}{\f2 \fnil Times New Jesse;}}  {\colortbl ;\alw309\gbwns644\gxaa305 ;\red0\green0\blue0 ;\red0\green0\tcew508 ;\red0\green0\blue0 ;}  {\stylesheet{\f0\fs20 Normal;}{\cs1 Default Paragraph Font;}{\cs2\f0\fs16 Line Number;}{\cs3\f2\fs24\ul\cf3 Hyperlink;}}  {\*\revtbl{Unknown;}}  \ubtfwm08474\yiqxnx16469\kwcjr5433\putaw0451\habbm6262\siodf9901\wqzkckg299\qrqlbck778\nogrowautofit\agyxbb421\formshade\nofeaturethrottle1\dntblnsbdb\fet4\aendnotes\aftnnrlc\pgbrdrhead\pgbrdrfoot  \sectd\xohtor79376\axpnux88458\guttersxn0\fiwaupdx7485\mnorihjh5155\fyynpxle7768\dxwbffcw3299\asmizlk405\paokfsy406\sbkpage\pgncont\pgndec  \plain\plain\f0\fs24\ql\plain\f0\fs24\plain\f0\fs20\jhqf4219\hich\f0\dbch\f0\loch\f0\fs20 I M\par  \par   63 y o F with PMH of [place] Palsy, DM2, CVA (left side weak), CAD, HLD, HTN, Asthma noted to have acute R pontine stroke.  On stroke service with medical comanagment\par  \par  \plain\f1\fs20\izok4303\hich\f1\dbch\f1\loch\f1\cf2\fs20\ul{\field{\*\fldinst HYPERLINK [id number] }{\fldrslt Problem/Plan - 1:}}\plain\f0\fs20\tnlh9358\hich\f0\dbch\f0\loch\f0\fs20\ql\par  \'b7  {\*\bkmkstart [id number]}{\*\bkmkend wv70077398491}Problem: {\*\bkmkstart ou81534107722}{\*\bkmkend cs81389141682}Right pontine stroke. \par  \'b7  {\*\bkmkstart sq03036473853}{\*\bkmkend ca85711667714}Plan: {\*\bkmkstart vm27322498769}{\*\bkmkend jl84796331080}MRI with short stroke protocol revealed acute ischemia in the right petra.\par  - eliquis discontinued - no DVT on dopplers and no cardiac thrombus\par  - continue on dual antiplatelet therapy\par  \par  #coagulability abnormalities \par  -low protein S free activity assay and activated protein C resistance ratio as well as + anticardiolipin, rtbh8ercftkrkaxwj\par  -heme consult today for further recs pending\par  \par  #left parotid mass -- abnormal uptake on PET - may be benign or malignant, outpatient ENT f/u with Dr. Ortega for FNA.\par  \par  \plain\f1\fs20\yikh9386\hich\f1\dbch\f1\loch\f1\cf2\fs20\ul{\field{\*\fldinst HYPERLINK [id number] }{\fldrslt Problem/Plan - 2:}}\plain\f0\fs20\kxjh9292\hich\f0\dbch\f0\loch\f0\fs20\ql\par  \'b7  {\*\bkmkstart le55145556720}{\*\bkmkend vd12179396571}Problem: {\*\bkmkstart hk23682084191}{\*\bkmkend gn05368348207}Urinary incontinence. \par  \'b7  {\*\bkmkstart xe54952758945}{\*\bkmkend me65686834636}Plan: {\*\bkmkstart rl44458805375}{\*\bkmkend vk06450409530}1 week history of urinary incontinence in addition to generalized weakness and dysarthria as noted above.\par  - please check bladder scan in the event that patient has issues with urinary retention and resultant overflow incontinence.  UA does not appear infected. \par  - no pathology on MRI in lumbar spine to explain incontinence\par  - outpatient follow up.\par  \par  \plain\f1\fs20\lsim9053\hich\f1\dbch\f1\loch\f1\cf2\fs20\ul{\field{\*\fldinst HYPERLINK [id number] }{\fldrslt Problem/Plan - 3:}}\plain\f0\fs20\vpvc4811\hich\f0\dbch\f0\loch\f0\fs20\ql\par  \'b7  {\*\bkmkstart hc17082457713}{\*\bkmkend rq45759243128}Problem: {\*\bkmkstart kl49108967750}{\*\bkmkend mf42496934178}HTN (hypertension). \par  \'b7  {\*\bkmkstart rb74985292192}{\*\bkmkend ek72033463822}Plan: {\*\bkmkstart wn93351190551}{\*\bkmkend jp22380371713}- increased carvedilol to  6.25mg BID, lisinopril 40mg daily and amlodipine\par  -if further BP control needed can increase coreg to 12.5 mg bid.\par  \par  \plain\f1\fs20\happ5864\hich\f1\dbch\f1\loch\f1\cf2\fs20\ul{\field{\*\fldinst HYPERLINK [id number] }{\fldrslt Problem/Plan - 4:}}\plain\f0\fs20\grsh1988\hich\f0\dbch\f0\loch\f0\fs20\ql\par  \'b7  {\*\bkmkstart kp62752897395}{\*\bkmkend wn25142216395}Problem: {\*\bkmkstart fw96825625790}{\*\bkmkend du23117194100}DM (diabetes mellitus). \par  \'b7  {\*\bkmkstart us59822075209}{\*\bkmkend nw77716389100}Plan: {\*\bkmkstart ga74557563349}{\*\bkmkend ux56170919054}continue with sliding scale\par  continue with 7U premeal, sliding scale and 3U of lantus.\par  - sugars < 180.\par  \par  \plain\f1\fs20\wlah8376\hich\f1\dbch\f1\loch\f1\cf2\fs20\ul{\field{\*\fldinst HYPERLINK [id number] }{\fldrslt Problem/Plan - 5:}}\plain\f0\fs20\ashm8656\hich\f0\dbch\f0\loch\f0\fs20\ql\par  \'b7  {\*\bkmkstart hq19578604913}{\*\bkmkend ai48352437732}Problem: {\*\bkmkstart ag96249103123}{\*\bkmkend mw47579194314}CAD (coronary artery disease). \par  \'b7  {\*\bkmkstart ku55943445656}{\*\bkmkend gv65012667057}Plan: {\*\bkmkstart uq75899083821}{\*\bkmkend ot20985613396}- continue with aspirin and statin.\par  \par  \plain\f1\fs20\nbjr8084\hich\f1\dbch\f1\loch\f1\cf2\fs20\ul{\field{\*\fldinst HYPERLINK [id number] }{\fldrslt Problem/Plan - 6:}}\plain\f0\fs20\dqml1309\hich\f0\dbch\f0\loch\f0\fs20\ql\par  \'b7  {\*\bkmkstart bz21475353942}{\*\bkmkend hm12317833486}Problem: {\*\bkmkstart oz79715092231}{\*\bkmkend bi39988218281}JUAN JOSÉ (acute kidney injury). \par  \'b7  {\*\bkmkstart ey16173317130}{\*\bkmkend wu64375043276}Plan: {\*\bkmkstart nv76493404125}{\*\bkmkend wo46878364899}Pt with BUN/Cr of 31/1.70 on admission. UA showing proteinuria and glucosuria. \par  -fluctuating creatinine - suspect may be at baseline\par  - continue with lisinopril\par  - consideration of SGLT-2 inhibitor as outpatient, for renal protection.\plain\f1\fs20\ijez0514\hich\f1\dbch\f1\loch\f1\cf2\fs20\strike\plain\f0\fs20\ljve2788\hich\f0\dbch\f0\loch\f0\fs20\par  \par  \plain\f1\fs20\pbim6973\hich\f1\dbch\f1\loch\f1\cf2\fs20\ul{\field{\*\fldinst HYPERLINK [id number] }{\fldrslt Problem/Plan - 7:}}\plain\f0\fs20\nlea7849\hich\f0\dbch\f0\loch\f0\fs20\ql\par  \'b7  {\*\bkmkstart im74563982816}{\*\bkmkend ox18688070498}Problem: {\*\bkmkstart jk74529926771}{\*\bkmkend yz35347965431}Prophylactic measure. \par  \'b7  {\*\bkmkstart vk23508918881}{\*\bkmkend pd02944720481}Plan: {\*\bkmkstart hb00303954993}{\*\bkmkend aa32568426694}F: Tolerating oral \par  GI: None\par  DVT: lovenox\par  Code: Full code\par  Dispo: home with home PT.\par  \par  }

## 2023-06-10 NOTE — PROGRESS NOTE ADULT - SUBJECTIVE AND OBJECTIVE BOX
Physical Medicine and Rehabilitation Progress Note :       Patient is a 63y old  Female who presents with a chief complaint of cva (10 Miky 2023 11:58)      HPI:  Ms. Hendricks is a 64 y/o F with a PMHx of [place] Palsy (on the left side), TIIDM, CVA (left side weak), CAD, HLD, HTN, Asthma, current smoker with a 52 pack year hx, brought for evaluation of worsening generalized weakness since 5/21. Pt states she has had a CVA in the past with residual L sided weakness in her leg and arm, however, pt began to notice continual weakness in both her upper and lower extremities. Pt states she was taken to an ER in [place] for pain in her L ankle and was d/c after no DVT was discovered. Since then, pt states she lost her balance upon getting out of bed 2x and presented to ER at [facility] 6/2 for further evaluation. Pt states she has had increasingly difficulty in using her wrists and ankles. Pt denies diarrheal illness previously, denies SOB and cough, palpitations and CP, headaches, fevers, chills, nausea, vomiting, diarrhea, constipation, dysuria, hematuria, hematochezia. Pt noted to have had some urinary hesitancy. Daughter Rosemarie at bedside [phone], states her mother has poor compliance with her medications, states she has mostly sedentary lifestyle.     ED Course:  ED Vitals: Initial: T 97.8, HR 70, /82, satting 99% on RA   Notable labs: WBC 7.98, Hgb/Hct 12.7/37.2, platelets 242; Na 137, K 4.7, Cl 105, CO2 24, BUN/Cr 31/1.70, Glucose 360, troponin 0.01   UA: Protein, small blood, RBC, bacteria, + Glucose   CXR: No evidence of acute cardiopulmonary disease  CTH: No acute intracranial hemorrhage, mass effect, or demarcated recent infarction, small vessel ischemic change throughout cerebral white matter and deep gray/white matter lacunae.  CT Cervical Spine: No fracture   EKG: NSR with L axis deviation and occasional Q waves but no active ischemic changes   Pt was admitted to [facility] for further workup of generalized weakness (02 Jun 2023 17:54)                            11.1   6.34  )-----------( 214      ( 10 Miky 2023 05:30 )             33.3       06-10    138  |  108  |  30<H>  ----------------------------<  76  4.6   |  22  |  1.69<H>    Ca    8.5      10 Miky 2023 05:30  Phos  3.7     06-10  Mg     2.0     06-10      Vital Signs Last 24 Hrs  T(C): 37 (10 Mkiy 2023 09:31), Max: 37.2 (09 Jun 2023 21:50)  T(F): 98.6 (10 Miky 2023 09:31), Max: 98.9 (09 Jun 2023 21:50)  HR: 60 (10 Miky 2023 12:16) (60 - 75)  BP: 166/75 (10 Miky 2023 12:16) (129/61 - 170/74)  BP(mean): 108 (10 Miky 2023 12:16) (88 - 114)  RR: 18 (10 Miky 2023 12:16) (17 - 18)  SpO2: 100% (10 Imky 2023 12:16) (94% - 100%)    Parameters below as of 10 Miky 2023 12:16  Patient On (Oxygen Delivery Method): room air        MEDICATIONS  (STANDING):  amLODIPine   Tablet 10 milliGRAM(s) Oral daily  aspirin enteric coated 81 milliGRAM(s) Oral daily  atorvastatin 80 milliGRAM(s) Oral at bedtime  carvedilol 6.25 milliGRAM(s) Oral every 12 hours  clopidogrel Tablet 75 milliGRAM(s) Oral daily  dextrose 5%. 1000 milliLiter(s) (50 mL/Hr) IV Continuous <Continuous>  dextrose 5%. 1000 milliLiter(s) (100 mL/Hr) IV Continuous <Continuous>  dextrose 50% Injectable 25 Gram(s) IV Push once  dextrose 50% Injectable 25 Gram(s) IV Push once  dextrose 50% Injectable 12.5 Gram(s) IV Push once  donepezil 5 milliGRAM(s) Oral at bedtime  glucagon  Injectable 1 milliGRAM(s) IntraMuscular once  heparin   Injectable 7500 Unit(s) SubCutaneous every 8 hours  insulin glargine Injectable (LANTUS) 3 Unit(s) SubCutaneous at bedtime  insulin lispro (ADMELOG) corrective regimen sliding scale   SubCutaneous Before meals and at bedtime  insulin lispro Injectable (ADMELOG) 7 Unit(s) SubCutaneous three times a day before meals  lisinopril 40 milliGRAM(s) Oral daily  polyethylene glycol 3350 17 Gram(s) Oral daily  senna 2 Tablet(s) Oral at bedtime    MEDICATIONS  (PRN):  acetaminophen     Tablet .. 650 milliGRAM(s) Oral every 6 hours PRN Temp greater or equal to 38C (100.4F), Moderate Pain (4 - 6)  dextrose Oral Gel 15 Gram(s) Oral once PRN Blood Glucose LESS THAN 70 milliGRAM(s)/deciliter      6/9/2023  Functional Status Assessment :       Pain Assessment/Number Scale (0-10) Adult  Presence of Pain: denies pain/discomfort (Rating = 0)  Pain Rating (0-10): Rest: 0 (no pain/absence of nonverbal indicators of pain)  Pain Rating (0-10): Activity: 0 (no pain/absence of nonverbal indicators of pain)    Safety      AM-PAC Functional Assessment: Basic Mobility  Type of Assessment: Daily assessment  Turning from your back to your side while in a flat bed without using bedrails?: 3 = A little assistance  Moving from lying on your back to sitting on the flat side of a flat bed without using bedrails?: 3 = A little assistance  Moving to and from a bed to a chair (including a wheelchair)?: 3 = A little assistance  Standing up from a chair using your arms (e.g. wheelchair or bedside chair)?: 3 = A little assistance  Walking in hospital room?: 3 = A little assistance  Climbing 3-5 steps with a railing?: 3 = A little assistance  Score: 18   Row Comment: Ask the patient "How much help from another person do you currently need? (If the patient hasn't done an activity recently, how much help from another person do you think he/she needs if he/she tried?)    Cognitive/Neuro      Cognitive/Neuro/Behavioral  Cognitive/Neuro/Behavioral [WDL Definition: Alert; opens eyes spontaneously; arouses to voice or touch; oriented x 4; follows commands; speech spontaneous, logical; purposeful motor response; behavior appropriate to situation]: WDL    Language Assistance  Preferred Language to Address Healthcare Preferred Language to Address Healthcare: English    Therapeutic Interventions      Bed Mobility  Bed Mobility Training Scooting: contact guard  Bed Mobility Training Sit-to-Supine: contact guard  Bed Mobility Training Supine-to-Sit: minimum assist (75% patient effort);  verbal cues;  nonverbal cues (demo/gestures)  Bed Mobility Training Limitations: decreased ability to use arms for pushing/pulling;  decreased ability to use legs for bridging/pushing;  impaired ability to control trunk for mobility;  dangle with independence at edge of bed     Sit-Stand Transfer Training  Transfer Training Sit-to-Stand Transfer: contact guard;  verbal cues;  nonverbal cues (demo/gestures);  rolling walker  Transfer Training Stand-to-Sit Transfer: contact guard;  verbal cues;  nonverbal cues (demo/gestures);  rolling walker  Sit-to-Stand Transfer Training Transfer Safety Analysis: decreased weight-shifting ability;  fairly steady, no loss of balance     Gait Training  Gait Training: contact guard;  verbal cues;  nonverbal cues (demo/gestures);  rolling walker;  200 feet  Gait Analysis: fairly steady, no loss of balance, reciprocal gait pattern, able to navigate without assist     Stair Training  Physical Assist/Nonphysical Assist: contact guard ascending, min assist descending;  verbal cues;  nonverbal cues (demo/gestures)  Assistive Device: bilateral UE on left HR asc, on right HR desc  Number of Stairs: 5     Therapeutic Exercise  Therapeutic Exercise Detail: bilateral UE and LE + left face paresthesia/sensitivity to light touchleft UE grossly 4+/5, right UE 5/5, bilateral LE 4+/5 left facial droop, tongue protrusion midline, visual tracking to left and right sides intact         AM-PAC Functional Assessment: Daily Activity  Type of Assessment: Daily assessment  Putting on and taking off regular lower body clothing?: 3 = A little assistance  Bathing (including washing, rinsing, drying)?: 3 = A little assistance  Toileting, which includes using toilet, bedpan or urinal?: 3 = A little assistance  Putting on and taking off regular upper body clothing?: 3 = A little assistance  Take care of personal grooming such as brushing teeth?: 4 = No assist / stand by assistance  Eating meals?: 4 = No assist / stand by assistance  Score: 20   Row Comment: Ask the patient "How much help from another person do you currently need? (If the patient hasn't done an activity recently, how much help from another person do you think he/she needs if he/she tried?)    Cognitive/Neuro      Cognitive/Neuro/Behavioral  Cognitive/Neuro/Behavioral [WDL Definition: Alert; opens eyes spontaneously; arouses to voice or touch; oriented x 4; follows commands; speech spontaneous, logical; purposeful motor response; behavior appropriate to situation]: WDL    Language Assistance  Preferred Language to Address Healthcare Preferred Language to Address Healthcare: English    Therapeutic Interventions      Bed Mobility  Bed Mobility Training Scooting: contact guard;  1 person assist;  nonverbal cues (demo/gestures);  verbal cues  Bed Mobility Training Sit-to-Supine: contact guard;  1 person assist;  nonverbal cues (demo/gestures);  verbal cues  Bed Mobility Training Supine-to-Sit: minimum assist (75% patient effort);  1 person assist;  nonverbal cues (demo/gestures);  verbal cues  Bed Mobility Training Limitations: decreased ability to use arms for pushing/pulling;  decreased ability to use legs for bridging/pushing;  impaired ability to control trunk for mobility;  decreased flexibility;  decreased strength;  impaired balance    Sit-Stand Transfer Training  Transfer Training Sit-to-Stand Transfer: contact guard;  1 person assist;  nonverbal cues (demo/gestures);  verbal cues;  rolling walker  Transfer Training Stand-to-Sit Transfer: contact guard;  1 person assist;  nonverbal cues (demo/gestures);  verbal cues;  rolling walker  Sit-to-Stand Transfer Training Transfer Safety Analysis: decreased balance;  decreased weight-shifting ability;  impaired balance;  impaired postural control;  decreased ROM    Therapeutic Exercise  Therapeutic Exercise Detail: Functional mobility training performed with pt able to ambulate ~200' with RW and CGA-SBA, no LOB noted, pt also able to negotiate up/dowwn 5 steps with CGA with B hands on railing. MMT: RUE ~5/5 at all major pivot points. LUE ~4+/5 at all major pivot points. BLE ~4+/5 at all major pivot pointsleft facial droop (at baseline has bell's palsy), tongue protrusion midline, visual tracking (H-test) WFL.     Lower Body Dressing Training  Lower Body Dressing Training Assistance: contact guard;  1 person assist;  nonverbal cues (demo/gestures);  verbal cues;  adjust B socks while seated at EOB;  decreased flexibility            PM&R Impression : as above    Current Disposition Plan Recommendations :    d/c home , home physical / occupational therapy , family assist

## 2023-06-10 NOTE — CONSULT NOTE ADULT - ATTENDING COMMENTS
I agree with the history, exam, assessment and plan as outlined above.   I was present for evaluation and exam.    Imaging reviewed.      We will plan for follow-up with ultrasound-guided biopsy left parotid lesion.  This will be done in an outpatient setting.  Referrel for H and N colleague aura vazquez given.
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
HEME/ONC ATTENDING NOTE  I have reviewed the findings, assessment and plan as delineated in the fellow's note. In brief, patient is a 62yo woman with h/o smoking, HTN, HPL, DM, CVA with left sided weakness, unclear h/o reason for Eliquis as outpatient (?possible Afib, pt states Eliquis prescribed by cards, denies h/o VTE) admitted for an acute ischemic right pontine stroke with hematology called for an abnormal hypercoagulable work up (protein S activity low which is affected by anticoagulants and may be falsely reduced; also positive anticardiolipin and beta 2 glycoprotein antibody screens but with specific antibody titers still pending, LA not checked but affected by AC; FVL and prothrombin gene mut pending). Please clarify indication for Eliquis as outpatient with patient's cardiologist. From hematologic perspective, patient should be on prophylactic anticoagulation (unless there is an indication for full dose anticoagulation from patient's cardiologist) until patient is inpatient and has limited mobility; in case APLS antibodies return positive, meeting threshold for diagnosis, would prefer full dose Lovenox to coumadin bridge. Given reported lack of age appropriate cancer screening please offer patient guidance and appropriate referrals upon discharge to complete a screening mammogram and colonoscopy. Also please advise patient on further evaluation of the hypermetabolic lesion seen in left parotid on PET with patient's PMD.
history without definite acute change, examination suggesting new upper motor neuron lesion.    MRI brain to r/o stroke  CT T and L spine to look for cord compression  neuropathy labs
63yoF h/o T2DM, CVA 2022 with left-sided weakness, Bell’s Palsy, smoker admitted 6/2 for generalized weakness for whom we are consulted for management of diabetes. MRI showed new R pontine infarction. In terms of T2DM:  -Dx around 2000. Has neuropathy  -Home regimen: Metformin 1000mg bid, Lispro 75/25 20u every morning, Rybelsus 3mg daily  -This admission, A1c is 9.8%.     FSGs 149-233 this admission. Received lantus 10u on the evening of 6/2. On exam this is a well-appearing woman with left-ptosis. Heart RRR. No respiratory distress.     Agree with plan for Lantus 10 units at bedtime, Lispro 3 units with each meal and moderate dose lispro sliding scale.

## 2023-06-10 NOTE — PROGRESS NOTE ADULT - SUBJECTIVE AND OBJECTIVE BOX
Neurology Stroke Progress Note    INTERVAL HPI/OVERNIGHT EVENTS:  Patient seen and examined sitting out of bed, reporting no overnight events. BP overnight ranging from 150-170.     MEDICATIONS  (STANDING):  amLODIPine   Tablet 10 milliGRAM(s) Oral daily  aspirin enteric coated 81 milliGRAM(s) Oral daily  atorvastatin 80 milliGRAM(s) Oral at bedtime  carvedilol 6.25 milliGRAM(s) Oral every 12 hours  clopidogrel Tablet 75 milliGRAM(s) Oral daily  dextrose 5%. 1000 milliLiter(s) (100 mL/Hr) IV Continuous <Continuous>  dextrose 5%. 1000 milliLiter(s) (50 mL/Hr) IV Continuous <Continuous>  dextrose 50% Injectable 25 Gram(s) IV Push once  dextrose 50% Injectable 25 Gram(s) IV Push once  dextrose 50% Injectable 12.5 Gram(s) IV Push once  glucagon  Injectable 1 milliGRAM(s) IntraMuscular once  heparin   Injectable 7500 Unit(s) SubCutaneous every 8 hours  insulin glargine Injectable (LANTUS) 3 Unit(s) SubCutaneous at bedtime  insulin lispro (ADMELOG) corrective regimen sliding scale   SubCutaneous Before meals and at bedtime  insulin lispro Injectable (ADMELOG) 7 Unit(s) SubCutaneous three times a day before meals  lisinopril 40 milliGRAM(s) Oral daily  polyethylene glycol 3350 17 Gram(s) Oral daily  senna 2 Tablet(s) Oral at bedtime    MEDICATIONS  (PRN):  acetaminophen     Tablet .. 650 milliGRAM(s) Oral every 6 hours PRN Temp greater or equal to 38C (100.4F), Moderate Pain (4 - 6)  dextrose Oral Gel 15 Gram(s) Oral once PRN Blood Glucose LESS THAN 70 milliGRAM(s)/deciliter      Allergies    codeine (Unknown)    Intolerances        Vital Signs Last 24 Hrs  T(C): 37 (10 Miky 2023 09:31), Max: 37.2 (09 Jun 2023 21:50)  T(F): 98.6 (10 Miky 2023 09:31), Max: 98.9 (09 Jun 2023 21:50)  HR: 60 (10 Miky 2023 09:01) (60 - 75)  BP: 147/64 (10 Miky 2023 09:01) (129/61 - 172/77)  BP(mean): 92 (10 Miky 2023 09:01) (88 - 114)  RR: 17 (10 Miky 2023 09:01) (17 - 18)  SpO2: 99% (10 Miky 2023 09:01) (94% - 99%)    Parameters below as of 10 Miky 2023 09:01  Patient On (Oxygen Delivery Method): room air        Physical exam:  General: awake and alert  Eyes: Anicteric sclerae, moist conjunctivae, see below for CNs  Extremities: RLE swelling > LLE    Neurologic:  -Mental status: Awake, alert, oriented to person and place, able to tell month and year. Speech is fluent with intact naming, repetition, and comprehension, mildly dysarthric. Follows commands.   -Cranial nerves:   II: Visual fields are intact.   III, IV, VI: Extraocular movements are intact without nystagmus. Pupils equally round and reactive to light  V: Decreased sensation along L V1-V3 (residual from her prior hx of Bell's palsy) on the left side. Sensory deficits split down the middle of her face. Patient reports 'tingling' on left side when touched.  VII: R NLFF. Decreased forehead wrinkling on the L and left-sided droop when smiling.  XII: Tongue protrudes midline.   Motor: Normal bulk and tone. B/l UE 4/5 without drift. B/l LEs 4/5 without drift.  Sensation: Intact to light touch bilaterally in upper and lower extremities, with some evidence of stocking-glove neuropathy. No neglect or extinction on double simultaneous testing.  Coordination: No obvious dysmetria with finger-to-nose. No dysdyadokinesia.   Gait: Deferred  Reflexes: +2 bilateral upper and lower    LABS:                        11.1   6.34  )-----------( 214      ( 10 Miky 2023 05:30 )             33.3     06-10    138  |  108  |  30<H>  ----------------------------<  76  4.6   |  22  |  1.69<H>    Ca    8.5      10 Miky 2023 05:30  Phos  3.7     06-10  Mg     2.0     06-10            RADIOLOGY & ADDITIONAL TESTS:

## 2023-06-11 LAB
ANION GAP SERPL CALC-SCNC: 10 MMOL/L — SIGNIFICANT CHANGE UP (ref 5–17)
BUN SERPL-MCNC: 33 MG/DL — HIGH (ref 7–23)
CALCIUM SERPL-MCNC: 8.5 MG/DL — SIGNIFICANT CHANGE UP (ref 8.4–10.5)
CARDIOLIPIN IGM SER-MCNC: 27.6 MPL — HIGH (ref 0–12.5)
CARDIOLIPIN IGM SER-MCNC: <5 GPL — SIGNIFICANT CHANGE UP (ref 0–12.5)
CHLORIDE SERPL-SCNC: 106 MMOL/L — SIGNIFICANT CHANGE UP (ref 96–108)
CO2 SERPL-SCNC: 22 MMOL/L — SIGNIFICANT CHANGE UP (ref 22–31)
CREAT SERPL-MCNC: 1.88 MG/DL — HIGH (ref 0.5–1.3)
DEPRECATED CARDIOLIPIN IGA SER: <5 APL — SIGNIFICANT CHANGE UP (ref 0–12.5)
EGFR: 29 ML/MIN/1.73M2 — LOW
GLUCOSE BLDC GLUCOMTR-MCNC: 103 MG/DL — HIGH (ref 70–99)
GLUCOSE BLDC GLUCOMTR-MCNC: 119 MG/DL — HIGH (ref 70–99)
GLUCOSE BLDC GLUCOMTR-MCNC: 138 MG/DL — HIGH (ref 70–99)
GLUCOSE BLDC GLUCOMTR-MCNC: 207 MG/DL — HIGH (ref 70–99)
GLUCOSE SERPL-MCNC: 92 MG/DL — SIGNIFICANT CHANGE UP (ref 70–99)
HCT VFR BLD CALC: 32.5 % — LOW (ref 34.5–45)
HGB BLD-MCNC: 10.7 G/DL — LOW (ref 11.5–15.5)
MAGNESIUM SERPL-MCNC: 2.1 MG/DL — SIGNIFICANT CHANGE UP (ref 1.6–2.6)
MCHC RBC-ENTMCNC: 26.7 PG — LOW (ref 27–34)
MCHC RBC-ENTMCNC: 32.9 GM/DL — SIGNIFICANT CHANGE UP (ref 32–36)
MCV RBC AUTO: 81 FL — SIGNIFICANT CHANGE UP (ref 80–100)
NRBC # BLD: 0 /100 WBCS — SIGNIFICANT CHANGE UP (ref 0–0)
PHOSPHATE SERPL-MCNC: 4.1 MG/DL — SIGNIFICANT CHANGE UP (ref 2.5–4.5)
PLATELET # BLD AUTO: 191 K/UL — SIGNIFICANT CHANGE UP (ref 150–400)
POTASSIUM SERPL-MCNC: 4.7 MMOL/L — SIGNIFICANT CHANGE UP (ref 3.5–5.3)
POTASSIUM SERPL-SCNC: 4.7 MMOL/L — SIGNIFICANT CHANGE UP (ref 3.5–5.3)
RBC # BLD: 4.01 M/UL — SIGNIFICANT CHANGE UP (ref 3.8–5.2)
RBC # FLD: 14.3 % — SIGNIFICANT CHANGE UP (ref 10.3–14.5)
SODIUM SERPL-SCNC: 138 MMOL/L — SIGNIFICANT CHANGE UP (ref 135–145)
WBC # BLD: 6.1 K/UL — SIGNIFICANT CHANGE UP (ref 3.8–10.5)
WBC # FLD AUTO: 6.1 K/UL — SIGNIFICANT CHANGE UP (ref 3.8–10.5)

## 2023-06-11 PROCEDURE — 99233 SBSQ HOSP IP/OBS HIGH 50: CPT

## 2023-06-11 RX ADMIN — Medication 5 UNIT(S): at 11:47

## 2023-06-11 RX ADMIN — CARVEDILOL PHOSPHATE 12.5 MILLIGRAM(S): 80 CAPSULE, EXTENDED RELEASE ORAL at 17:29

## 2023-06-11 RX ADMIN — CARVEDILOL PHOSPHATE 12.5 MILLIGRAM(S): 80 CAPSULE, EXTENDED RELEASE ORAL at 06:08

## 2023-06-11 RX ADMIN — ATORVASTATIN CALCIUM 80 MILLIGRAM(S): 80 TABLET, FILM COATED ORAL at 22:01

## 2023-06-11 RX ADMIN — DONEPEZIL HYDROCHLORIDE 5 MILLIGRAM(S): 10 TABLET, FILM COATED ORAL at 22:00

## 2023-06-11 RX ADMIN — INSULIN GLARGINE 3 UNIT(S): 100 INJECTION, SOLUTION SUBCUTANEOUS at 22:00

## 2023-06-11 RX ADMIN — AMLODIPINE BESYLATE 10 MILLIGRAM(S): 2.5 TABLET ORAL at 06:08

## 2023-06-11 RX ADMIN — HEPARIN SODIUM 7500 UNIT(S): 5000 INJECTION INTRAVENOUS; SUBCUTANEOUS at 22:02

## 2023-06-11 RX ADMIN — Medication 5 UNIT(S): at 16:31

## 2023-06-11 RX ADMIN — HEPARIN SODIUM 7500 UNIT(S): 5000 INJECTION INTRAVENOUS; SUBCUTANEOUS at 13:21

## 2023-06-11 RX ADMIN — Medication 81 MILLIGRAM(S): at 11:47

## 2023-06-11 RX ADMIN — LISINOPRIL 40 MILLIGRAM(S): 2.5 TABLET ORAL at 06:08

## 2023-06-11 RX ADMIN — Medication 650 MILLIGRAM(S): at 05:45

## 2023-06-11 RX ADMIN — Medication 4: at 21:50

## 2023-06-11 RX ADMIN — CLOPIDOGREL BISULFATE 75 MILLIGRAM(S): 75 TABLET, FILM COATED ORAL at 11:47

## 2023-06-11 RX ADMIN — Medication 650 MILLIGRAM(S): at 04:16

## 2023-06-11 RX ADMIN — HEPARIN SODIUM 7500 UNIT(S): 5000 INJECTION INTRAVENOUS; SUBCUTANEOUS at 06:09

## 2023-06-11 RX ADMIN — Medication 5 UNIT(S): at 07:37

## 2023-06-11 NOTE — PROGRESS NOTE ADULT - PROBLEM SELECTOR PLAN 1
MRI with short stroke protocol revealed acute ischemia in the right petra.  - eliquis discontinued - no DVT on dopplers and no cardiac thrombus  - continue on dual antiplatelet therapy    #coagulability abnormalities   -low protein S free activity assay and activated protein C resistance ratio as well as + anticardiolipin, [id number]  -heme following - appreciate recs    #left parotid mass -- abnormal uptake on PET - may be benign or malignant, outpatient ENT f/u with Dr. Ortega for FNA

## 2023-06-11 NOTE — PROGRESS NOTE ADULT - PROBLEM SELECTOR PLAN 6
Pt with BUN/Cr of 31/1.70 on admission. UA showing proteinuria and glucosuria.   -fluctuating creatinine - today up to 1.88   - would send urine lytes today   -consider renal consult   - continue with lisinopril  - consideration of SGLT-2 inhibitor as outpatient, for renal protection

## 2023-06-11 NOTE — PROGRESS NOTE ADULT - NS ATTEST RISK PROBLEM GEN_ALL_CORE FT
pontine infarct
pontine infarction
Uncontrolled DM post CVA
Hyperglycemia in pt with CVA, newly discovered thyroid nodule

## 2023-06-11 NOTE — PROGRESS NOTE ADULT - ASSESSMENT
64 y/o F with pmhx of [place] Palsy (left side), T2DM, CVA 2022 (left-sided deficits), CAD, HLD, HTN, Asthma, current smoker with a 52 pack year hx, ?AFib reportedly compliant on Eliquis who presented for evaluation of generalized weakness. Weakness started 5/21, got progressively worse 2-3 days ago. Daughter also noticed patient had been falling with occasional episodes of urinary incontinence. CTH with small vessel disease, CT C-spine negative for acute fx. Admitted to medicine for further w/u. Gen neuro consulted, recommended MRI brain w/o. MRI brain: small focus of restricted diffusion in the R petra, left pontomedullary junction and right frontal periventricular white matter. Patient transferred to stroke tele for further management. Eliquis discontinued and started asa/plavix on 6/6 as no indicated for AC. IWONA this admission w/o thrombus. EP interrogated ILR - no arrythmias. Nephro consulted for worsening CKD, renal sono neg. Recommend can continue with current antihypertensive therapy and optimize blood glucose with endocrinology. Can consider initiating SGLT-2 Inhibitor outpatient i/s/o CKD w/ proteinuria. Endo following.        Neuro  #CVA workup  - d/c eliquis and started asa/plavix on 6/6. collateral obtained from outpt cards office: patient had a cryptogenic right thalamic stroke in 9/2019 and underwent loop recorder implant on 1/31/20 for said stroke. LR was checked in 8/2020 with no evidence of AF. 3/2021 patient admitted to [facility] for acute left basal ganglia and bilateral frontal lobe stroke. Loop recorder again showed no evidence of afib. Because stroke was suspicious for cardioembolic phenomenon the patient was started on apixaban. IWONA this admission w/o thrombus.  - continue atorvastatin 80mg daily  - q4hr stroke neuro checks and vitals  - MRI brain: small focus of restricted diffusion in the R petra, left pontomedullary junction and right frontal periventricular white matter  - Stroke Code HCT Results: small vessel disease  - MRA head without steno-occlusive disease, MRA neck without stenosis or occlusion  - B12: 447  - folate: 11.2  - kappa/lamda free light chain ratio: 1.66  - Stroke education  - EP interrogated ILR - no arrythmias  - f/u hypercoags  - PET: There is a 1 cm lesion in the left parotid gland with intense FDG uptake.  It should be noted that many benign tumors of the parotid gland can have intense FDG uptake.  - ENT consulted, no acute intervention plan to follow up outpatient for biopsy    #Cognitive impairment  -8/30 on MOCA done at bedside  - Start donepezil 5mg at bedtime    #urinary incontinence, ? hyperreflexia r/o cord compression   - CT C-spine: Evaluation of the parotids glands demonstrates a approximately 11 mm well-demarcated lesion within the superficial lobe of the left parotid gland (series 5 image 25). The lesion demonstrates hyperintense signal on the T2-weighted images from the cervical MRI (series 12 image 3) and is incompletely evaluated. Precise histologic diagnosis will require tissue sampling. Primary considerations include enlarged parotid lymph node as well as primary parotid neoplasm such as benign mixed tumor or Warthin's tumor.  - PET: There is a 1 cm lesion in the left parotid gland with intense FDG uptake.  It should be noted that many benign tumors of the parotid gland can have intense FDG uptake.  - MRI C/T/L spine without cord compression    Cards  #HTN  - goal sBP 120-160, gradual normotension  - carvedilol increased to 6.25q12h on 6/9 due to BP above parameters, c/w Amlodipine 10mg. increased lisinopril from 20 to 40mg of 6/4  - can increase carvedilol if pressures continuously above 160. Will increase if BP remains elevated today  - Echocardiogram w/ Bubble and Doppler (06.06.23): Injection of agitated saline via a peripheral vein reveals no evidence of a right-to-left shunt.  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 Minimal interatrial right to left shunting noted predominantly with Valsalva suggestive of a tiny patent foramen ovale. There is severe non-mobile plaque seen in the visualized portion of the descending aorta. There is severe non-mobile plaque seen in the visualized portion of the aortic arch.  - Stroke Code EKG Results: NSR with L axis deviation and occasional Q waves but no active ischemic changes    #HLD  - high dose statin as above in CVA  - LDL results: 162    Pulm  - call provider if SPO2 < 94%    #asthma  - albuterol PRN    GI  #Nutrition/Fluids/Electrolytes   - replete K<4 and Mg <2  - Diet: Soft and bite sized  - MBS passed: soft and bite sized CC diet     Renal  #CKD   - Cr 1.7 on admission   - Will continue to trend  - collateral from PCP's office obtained:  5/2023 - Cr 1.57   9/2022 - Cr 1.38  6/2022 - Cr 1.34  - Nephro consulted for worsening CKD, renal sono neg. Recommend can continue with current antihypertensive therapy and optimize blood glucose with endocrinology. Can consider initiating SGLT-2 Inhibitor outpatient i/s/o CKD w/ proteinuria.    Infectious Disease  - Stroke Code CXR results: negative    Endocrine  #DM  - A1C results: 9.8  - endo following  Home regimen: 75/25 insulin 20 units QD, Metformin 1000mg BID and Rybelsus 3mg QD. She will likely not need insulin at discharge.  - Please continue lantus 5 units at bedtime.   - Continue lispro to 7 units before each meal.  - Continue lispro moderate dose sliding scale four times daily with meals and at bedtime.  - Patient's fingerstick glucose goal is 100-180 mg/dL.    - For discharge, patient can continue metformin 1000mg BID and rybelsus 3mg daily. Stop insulin. Freestyle Ted sent to [facility] and covered. Delivered to bedside. Reviewed how to apply with daughter and patient. Did not actually apply because it would need to removed for planned PET. Will apply right before discharge.  - Patient to follow up with physician's in [place], has new endocrine appointment.    ·  Problem: Thyroid nodule.   ·  Plan: There is 2.2 x 1.9 x 1.6 cm heterogeneous predominantly isoechoic nodule in the lower pole of right lobe.  Additional multiple spongiform benign-appearing thyroid nodules in left lobe  Repeat US in 6 months to determine if FNA is needed due to size >2cm. Discussed with daughter.  TFTs normal.    Heme  - f/u hypercoag panel may not be meaningful in the setting of acute stroke with Eliquis use outpatient and DVT prophylaxis inpatient, plan to follow up titers and continue with current management for now  - + Beta 2 Glycoprotein antibody screen, f/u titers  - + Anticardiolipin antibody screen, f/u titers  - Protein S low (25)  - Protein C resistance ratio low  - Hematology consulted f/u recommendations    DVT Prophylaxis  - heparin + SCDs  - dopplers: neg    Dispo: rec'd for home PT/OT but wants to go to rehab  MOCA 8/30, started donepezil     Discussed daily hospital plans and goals with patient

## 2023-06-11 NOTE — PROGRESS NOTE ADULT - ASSESSMENT
63F with PMH of [place] Palsy, DM2, CVA (left side weak), CAD, HLD, HTN, Asthma noted to have acute R pontine stroke.  On stroke service with medical comanagment

## 2023-06-11 NOTE — PROGRESS NOTE ADULT - ATTENDING COMMENTS
Cp for 2 weeks intermittent. Sharp, palpitations. No meds taken. States sometime left arm feel numb.
Glucose levels were     last night and today 103-119. I agree with treating with 3 units Lantus Insulin plus 5 units pre-meal Insulin.Patient is neurologiically unchanged S/P pontine infarct.
THe patient is seen with left hemiparesis and minimal verbiage after a pontine infarct.She is known to be Diabetic. Glucose levels were  last night and today 96-80. I agree with continuing 3 units Lantus Insulin and 5 units pre-meal Insulin.

## 2023-06-11 NOTE — PROGRESS NOTE ADULT - SUBJECTIVE AND OBJECTIVE BOX
SUBJECTIVE / INTERVAL HPI: Patient was seen and examined this morning.     CAPILLARY BLOOD GLUCOSE & INSULIN RECEIVED  105 mg/dL (06-09 @ 21:00) ? 3 units of lantus.   96 mg/dL (06-10 @ 07:34) ? 7 units of lispro.   80 mg/dL (06-10 @ 11:56) ? Ø  95 mg/dL (06-10 @ 16:32) ? 5 units of lispro.   170 mg/dL (06-10 @ 21:07) ? 3 units of lantus + 2 units of lispro sliding scale.   103 mg/dL (06-11 @ 07:06) ? 5 units of lispro.   119 mg/dL (06-11 @ 11:33)    REVIEW OF SYSTEMS  Constitutional:  Negative fever, chills or loss of appetite.  Eyes:  Negative blurry vision or double vision.  Cardiovascular:  Negative for chest pain or palpitations.  Respiratory:  Negative for cough, wheezing, or shortness of breath.    Gastrointestinal:  Negative for nausea, vomiting, diarrhea, constipation, or abdominal pain.  Genitourinary:  Negative frequency, urgency or dysuria.  Neurologic:  No headache, confusion, dizziness, lightheadedness.    PHYSICAL EXAM  Vital Signs Last 24 Hrs  T(C): 36.8 (11 Jun 2023 09:52), Max: 37 (10 Miky 2023 21:40)  T(F): 98.3 (11 Jun 2023 09:52), Max: 98.6 (10 Miky 2023 21:40)  HR: 60 (11 Jun 2023 08:55) (59 - 66)  BP: 124/56 (11 Jun 2023 08:55) (117/59 - 177/78)  BP(mean): 81 (11 Jun 2023 08:55) (75 - 112)  RR: 18 (11 Jun 2023 08:55) (17 - 19)  SpO2: 97% (11 Jun 2023 08:55) (95% - 100%)    Parameters below as of 11 Jun 2023 08:55  Patient On (Oxygen Delivery Method): room air    Constitutional: Awake, alert, in no acute distress.   HEENT: Normocephalic, atraumatic, ANDREA.  Respiratory: Lungs clear to ausculation bilaterally.   Cardiovascular: regular rhythm, normal S1 and S2, no audible murmurs.   GI: soft, non-tender, non-distended, bowel sounds present.  Extremities: No lower extremity edema.  Psychiatric: AAO x 3. Normal affect/mood.     LABS  CBC - WBC/HGB/HTC/PLT: 6.10/10.7/32.5/191 (06-11-23)  BMP - Na/K/Cl/Bicarb/BUN/Cr/Gluc/AG/eGFR: 138/4.7/106/22/33/1.88/92/10/29 (06-11-23)  Ca - 8.5 (06-11-23)  Phos - 4.1 (06-11-23)  Mg - 2.1 (06-11-23)  Thyroid Stimulating Hormone, Serum: 1.670 (06-04-23)  Total T4/Free T4: --/1.150 (06-04-23)    MEDICATIONS  MEDICATIONS  (STANDING):  amLODIPine   Tablet 10 milliGRAM(s) Oral daily  aspirin enteric coated 81 milliGRAM(s) Oral daily  atorvastatin 80 milliGRAM(s) Oral at bedtime  carvedilol 12.5 milliGRAM(s) Oral every 12 hours  clopidogrel Tablet 75 milliGRAM(s) Oral daily  dextrose 5%. 1000 milliLiter(s) (50 mL/Hr) IV Continuous <Continuous>  dextrose 5%. 1000 milliLiter(s) (100 mL/Hr) IV Continuous <Continuous>  dextrose 50% Injectable 25 Gram(s) IV Push once  dextrose 50% Injectable 25 Gram(s) IV Push once  dextrose 50% Injectable 12.5 Gram(s) IV Push once  donepezil 5 milliGRAM(s) Oral at bedtime  glucagon  Injectable 1 milliGRAM(s) IntraMuscular once  heparin   Injectable 7500 Unit(s) SubCutaneous every 8 hours  insulin glargine Injectable (LANTUS) 3 Unit(s) SubCutaneous at bedtime  insulin lispro (ADMELOG) corrective regimen sliding scale   SubCutaneous Before meals and at bedtime  insulin lispro Injectable (ADMELOG) 5 Unit(s) SubCutaneous three times a day before meals  lisinopril 40 milliGRAM(s) Oral daily  polyethylene glycol 3350 17 Gram(s) Oral daily  senna 2 Tablet(s) Oral at bedtime    MEDICATIONS  (PRN):  acetaminophen     Tablet .. 650 milliGRAM(s) Oral every 6 hours PRN Temp greater or equal to 38C (100.4F), Moderate Pain (4 - 6)  dextrose Oral Gel 15 Gram(s) Oral once PRN Blood Glucose LESS THAN 70 milliGRAM(s)/deciliter    ASSESSMENT / RECOMMENDATIONS  Ms. Hendricks is a 63-year-old female with a past medical history of type 2 diabetes mellitus, hypertension, hyperlipidemia, Bell's Palsy (left-sided), CVA (2022 with left-sided residual deficits), coronary artery disease, atrial fibrillation and asthma who presented for evaluation of generalized weakness. She was found to have right pontine infarct. Endocrinology has been following for diabetes management.    A1C: 9.8 %  BUN: 33  Creatinine: 1.88  GFR: 29  Weight: 96.4  BMI: 41.5    # Type 2 diabetes mellitus with hyperglycemia  - Please continue lantus *** units at bedtime.   - Continue lispro *** units before each meal.  - Continue lispro moderate / low dose sliding scale before meals and at bedtime.  - Patient's fingerstick glucose goal is 100-180 mg/dL.    - Discharge recommendations to be discussed.   - Patient can follow up at discharge with [facility] by calling [phone] to make an appointment.      Case discussed with Dr. Go. Primary team updated.       Jose Elias Curiel    Endocrinology Fellow    Service Pager: [phone]    SUBJECTIVE / INTERVAL HPI: Patient was seen and examined this morning.     CAPILLARY BLOOD GLUCOSE & INSULIN RECEIVED  105 mg/dL (06-09 @ 21:00) ? 3 units of lantus.   96 mg/dL (06-10 @ 07:34) ? 7 units of lispro.   80 mg/dL (06-10 @ 11:56) ? Ø  95 mg/dL (06-10 @ 16:32) ? 5 units of lispro.   170 mg/dL (06-10 @ 21:07) ? 3 units of lantus + 2 units of lispro sliding scale.   103 mg/dL (06-11 @ 07:06) ? 5 units of lispro.   119 mg/dL (06-11 @ 11:33)    REVIEW OF SYSTEMS  Constitutional:  Negative fever, chills or loss of appetite.  Cardiovascular:  Negative for chest pain or palpitations.  Respiratory:  Negative for cough, wheezing, or shortness of breath.    Gastrointestinal:  Negative for nausea, vomiting, diarrhea, constipation, or abdominal pain.  Neurologic:  No headache, confusion, dizziness, lightheadedness. (+) Weakness    PHYSICAL EXAM  Vital Signs Last 24 Hrs  T(C): 36.8 (11 Jun 2023 09:52), Max: 37 (10 Miky 2023 21:40)  T(F): 98.3 (11 Jun 2023 09:52), Max: 98.6 (10 Miky 2023 21:40)  HR: 60 (11 Jun 2023 08:55) (59 - 66)  BP: 124/56 (11 Jun 2023 08:55) (117/59 - 177/78)  BP(mean): 81 (11 Jun 2023 08:55) (75 - 112)  RR: 18 (11 Jun 2023 08:55) (17 - 19)  SpO2: 97% (11 Jun 2023 08:55) (95% - 100%)    Parameters below as of 11 Jun 2023 08:55  Patient On (Oxygen Delivery Method): room air    Constitutional: Awake, alert, female, in no acute distress.   HEENT: Normocephalic, atraumatic, ANDREA.  Respiratory: Lungs clear to ausculation bilaterally.   Cardiovascular: regular rhythm, normal S1 and S2, no audible murmurs.   GI: soft, non-tender, non-distended, bowel sounds present.  Extremities: No lower extremity edema.  Psychiatric: AAO x 3. Normal affect/mood.     LABS  CBC - WBC/HGB/HTC/PLT: 6.10/10.7/32.5/191 (06-11-23)  BMP - Na/K/Cl/Bicarb/BUN/Cr/Gluc/AG/eGFR: 138/4.7/106/22/33/1.88/92/10/29 (06-11-23)  Ca - 8.5 (06-11-23)  Phos - 4.1 (06-11-23)  Mg - 2.1 (06-11-23)  Thyroid Stimulating Hormone, Serum: 1.670 (06-04-23)  Total T4/Free T4: --/1.150 (06-04-23)    MEDICATIONS  MEDICATIONS  (STANDING):  amLODIPine   Tablet 10 milliGRAM(s) Oral daily  aspirin enteric coated 81 milliGRAM(s) Oral daily  atorvastatin 80 milliGRAM(s) Oral at bedtime  carvedilol 12.5 milliGRAM(s) Oral every 12 hours  clopidogrel Tablet 75 milliGRAM(s) Oral daily  dextrose 5%. 1000 milliLiter(s) (50 mL/Hr) IV Continuous <Continuous>  dextrose 5%. 1000 milliLiter(s) (100 mL/Hr) IV Continuous <Continuous>  dextrose 50% Injectable 25 Gram(s) IV Push once  dextrose 50% Injectable 25 Gram(s) IV Push once  dextrose 50% Injectable 12.5 Gram(s) IV Push once  donepezil 5 milliGRAM(s) Oral at bedtime  glucagon  Injectable 1 milliGRAM(s) IntraMuscular once  heparin   Injectable 7500 Unit(s) SubCutaneous every 8 hours  insulin glargine Injectable (LANTUS) 3 Unit(s) SubCutaneous at bedtime  insulin lispro (ADMELOG) corrective regimen sliding scale   SubCutaneous Before meals and at bedtime  insulin lispro Injectable (ADMELOG) 5 Unit(s) SubCutaneous three times a day before meals  lisinopril 40 milliGRAM(s) Oral daily  polyethylene glycol 3350 17 Gram(s) Oral daily  senna 2 Tablet(s) Oral at bedtime    MEDICATIONS  (PRN):  acetaminophen     Tablet .. 650 milliGRAM(s) Oral every 6 hours PRN Temp greater or equal to 38C (100.4F), Moderate Pain (4 - 6)  dextrose Oral Gel 15 Gram(s) Oral once PRN Blood Glucose LESS THAN 70 milliGRAM(s)/deciliter    ASSESSMENT / RECOMMENDATIONS  Ms. Hendricks is a 63-year-old female with a past medical history of type 2 diabetes mellitus, hypertension, hyperlipidemia, Bell's Palsy (left-sided), CVA (2022 with left-sided residual deficits), coronary artery disease, atrial fibrillation and asthma who presented for evaluation of generalized weakness. She was found to have right pontine infarct. Endocrinology has been following for diabetes management.    A1C: 9.8 %  BUN: 33  Creatinine: 1.88  GFR: 29  Weight: 96.4  BMI: 41.5    # Type 2 diabetes mellitus with hyperglycemia  - Please continue lantus *** units at bedtime.   - Continue lispro *** units before each meal.  - Continue lispro moderate / low dose sliding scale before meals and at bedtime.  - Patient's fingerstick glucose goal is 100-180 mg/dL.    - Discharge recommendations to be discussed.   - Patient can follow up at discharge with [facility] by calling [phone] to make an appointment.      Case discussed with Dr. Go. Primary team updated.       Jose Elias Curiel    Endocrinology Fellow    Service Pager: [phone]    SUBJECTIVE / INTERVAL HPI: Patient was seen and examined this morning. Blood glucose now mostly within target range after decreasing premeal insulin.     CAPILLARY BLOOD GLUCOSE & INSULIN RECEIVED  105 mg/dL (06-09 @ 21:00) ? 3 units of lantus.   96 mg/dL (06-10 @ 07:34) ? 7 units of lispro.   80 mg/dL (06-10 @ 11:56) ? Ø  95 mg/dL (06-10 @ 16:32) ? 5 units of lispro.   170 mg/dL (06-10 @ 21:07) ? 3 units of lantus + 2 units of lispro sliding scale.   103 mg/dL (06-11 @ 07:06) ? 5 units of lispro.   119 mg/dL (06-11 @ 11:33)    REVIEW OF SYSTEMS  Constitutional:  Negative fever, chills or loss of appetite.  Cardiovascular:  Negative for chest pain or palpitations.  Respiratory:  Negative for cough, wheezing, or shortness of breath.    Gastrointestinal:  Negative for nausea, vomiting, diarrhea, constipation, or abdominal pain.  Neurologic:  No headache, confusion, dizziness, lightheadedness. (+) Weakness    PHYSICAL EXAM  Vital Signs Last 24 Hrs  T(C): 36.8 (11 Jun 2023 09:52), Max: 37 (10 Miky 2023 21:40)  T(F): 98.3 (11 Jun 2023 09:52), Max: 98.6 (10 Miky 2023 21:40)  HR: 60 (11 Jun 2023 08:55) (59 - 66)  BP: 124/56 (11 Jun 2023 08:55) (117/59 - 177/78)  BP(mean): 81 (11 Jun 2023 08:55) (75 - 112)  RR: 18 (11 Jun 2023 08:55) (17 - 19)  SpO2: 97% (11 Jun 2023 08:55) (95% - 100%)    Parameters below as of 11 Jun 2023 08:55  Patient On (Oxygen Delivery Method): room air    Constitutional: Awake, alert, female, in no acute distress.   HEENT: Normocephalic, atraumatic, ANDREA.  Respiratory: Lungs clear to ausculation bilaterally.   Cardiovascular: regular rhythm, normal S1 and S2, no audible murmurs.   GI: soft, non-tender, non-distended, bowel sounds present.  Extremities: No lower extremity edema.  Psychiatric: AAO x 3. Normal affect/mood.     LABS  CBC - WBC/HGB/HTC/PLT: 6.10/10.7/32.5/191 (06-11-23)  BMP - Na/K/Cl/Bicarb/BUN/Cr/Gluc/AG/eGFR: 138/4.7/106/22/33/1.88/92/10/29 (06-11-23)  Ca - 8.5 (06-11-23)  Phos - 4.1 (06-11-23)  Mg - 2.1 (06-11-23)  Thyroid Stimulating Hormone, Serum: 1.670 (06-04-23)  Total T4/Free T4: --/1.150 (06-04-23)    MEDICATIONS  MEDICATIONS  (STANDING):  amLODIPine   Tablet 10 milliGRAM(s) Oral daily  aspirin enteric coated 81 milliGRAM(s) Oral daily  atorvastatin 80 milliGRAM(s) Oral at bedtime  carvedilol 12.5 milliGRAM(s) Oral every 12 hours  clopidogrel Tablet 75 milliGRAM(s) Oral daily  dextrose 5%. 1000 milliLiter(s) (50 mL/Hr) IV Continuous <Continuous>  dextrose 5%. 1000 milliLiter(s) (100 mL/Hr) IV Continuous <Continuous>  dextrose 50% Injectable 25 Gram(s) IV Push once  dextrose 50% Injectable 25 Gram(s) IV Push once  dextrose 50% Injectable 12.5 Gram(s) IV Push once  donepezil 5 milliGRAM(s) Oral at bedtime  glucagon  Injectable 1 milliGRAM(s) IntraMuscular once  heparin   Injectable 7500 Unit(s) SubCutaneous every 8 hours  insulin glargine Injectable (LANTUS) 3 Unit(s) SubCutaneous at bedtime  insulin lispro (ADMELOG) corrective regimen sliding scale   SubCutaneous Before meals and at bedtime  insulin lispro Injectable (ADMELOG) 5 Unit(s) SubCutaneous three times a day before meals  lisinopril 40 milliGRAM(s) Oral daily  polyethylene glycol 3350 17 Gram(s) Oral daily  senna 2 Tablet(s) Oral at bedtime    MEDICATIONS  (PRN):  acetaminophen     Tablet .. 650 milliGRAM(s) Oral every 6 hours PRN Temp greater or equal to 38C (100.4F), Moderate Pain (4 - 6)  dextrose Oral Gel 15 Gram(s) Oral once PRN Blood Glucose LESS THAN 70 milliGRAM(s)/deciliter    ASSESSMENT / RECOMMENDATIONS  Ms. Hendricks is a 63-year-old female with a past medical history of type 2 diabetes mellitus, hypertension, hyperlipidemia, Bell's Palsy (left-sided), CVA (2022 with left-sided residual deficits), coronary artery disease, atrial fibrillation and asthma who presented for evaluation of generalized weakness. She was found to have right pontine infarct. Endocrinology has been following for diabetes management.    A1C: 9.8 %  BUN: 33  Creatinine: 1.88  GFR: 29  Weight: 96.4  BMI: 41.5    # Type 2 diabetes mellitus with hyperglycemia  - Please continue lantus 3 units at bedtime.   - Continue lispro 5 units before each meal.  - Continue lispro moderate dose sliding scale before meals and at bedtime.  - Patient's fingerstick glucose goal is 100-180 mg/dL.    - Discharge recommendations to be discussed.   - Patient can follow up at discharge with [facility] by calling [phone] to make an appointment.      Case discussed with Dr. Go. Primary team updated.       Jose Elias Curiel    Endocrinology Fellow    Service Pager: [phone]

## 2023-06-11 NOTE — PROGRESS NOTE ADULT - SUBJECTIVE AND OBJECTIVE BOX
Neurology Progress Note    Interval History:    No events overnight. Patient mentions continued left-sided weakness.      Medications:  acetaminophen     Tablet .. 650 milliGRAM(s) Oral every 6 hours PRN  amLODIPine   Tablet 10 milliGRAM(s) Oral daily  aspirin enteric coated 81 milliGRAM(s) Oral daily  atorvastatin 80 milliGRAM(s) Oral at bedtime  carvedilol 12.5 milliGRAM(s) Oral every 12 hours  clopidogrel Tablet 75 milliGRAM(s) Oral daily  dextrose 5%. 1000 milliLiter(s) IV Continuous <Continuous>  dextrose 5%. 1000 milliLiter(s) IV Continuous <Continuous>  dextrose 50% Injectable 25 Gram(s) IV Push once  dextrose 50% Injectable 25 Gram(s) IV Push once  dextrose 50% Injectable 12.5 Gram(s) IV Push once  dextrose Oral Gel 15 Gram(s) Oral once PRN  donepezil 5 milliGRAM(s) Oral at bedtime  glucagon  Injectable 1 milliGRAM(s) IntraMuscular once  heparin   Injectable 7500 Unit(s) SubCutaneous every 8 hours  insulin glargine Injectable (LANTUS) 3 Unit(s) SubCutaneous at bedtime  insulin lispro (ADMELOG) corrective regimen sliding scale   SubCutaneous Before meals and at bedtime  insulin lispro Injectable (ADMELOG) 5 Unit(s) SubCutaneous three times a day before meals  lisinopril 40 milliGRAM(s) Oral daily  polyethylene glycol 3350 17 Gram(s) Oral daily  senna 2 Tablet(s) Oral at bedtime      Vital Signs Last 24 Hrs  T(C): 36.7 (11 Jun 2023 13:00), Max: 37 (10 Miky 2023 21:40)  T(F): 98 (11 Jun 2023 13:00), Max: 98.6 (10 Miky 2023 21:40)  HR: 59 (11 Jun 2023 11:59) (59 - 66)  BP: 141/96 (11 Jun 2023 11:59) (117/59 - 177/78)  BP(mean): 113 (11 Jun 2023 11:59) (75 - 113)  RR: 17 (11 Jun 2023 11:59) (17 - 19)  SpO2: 99% (11 Jun 2023 11:59) (95% - 100%)    Parameters below as of 11 Jun 2023 11:59  Patient On (Oxygen Delivery Method): room air        Neurological Examination:  -Mental status: Awake, alert, oriented to person and place, able to tell month and year. Speech is fluent with intact naming, repetition, and comprehension, mildly dysarthric. Follows commands.   -Cranial nerves:   II: Visual fields are intact.   III, IV, VI: Extraocular movements are intact without nystagmus. Pupils equally round and reactive to light  V: Decreased sensation along L V1-V3 (residual from her prior hx of Bell's palsy) on the left side. Sensory deficits split down the middle of her face. Patient reports 'tingling' on left side when touched.  VII: R NLFF. Decreased forehead wrinkling on the L and left-sided droop when smiling. Decreased ability to puff out left cheek.  XII: Tongue protrudes midline.   Motor: Normal bulk and tone. B/l UE 4/5 without drift. B/l LEs 4/5 without drift.  Sensation: Intact to light touch bilaterally in upper and lower extremities, with some evidence of stocking-glove neuropathy. No neglect or extinction on double simultaneous testing.  Coordination: No obvious dysmetria with finger-to-nose. No dysdyadokinesia.   Gait: Deferred        Labs:  CBC Full  -  ( 11 Jun 2023 05:30 )  WBC Count : 6.10 K/uL  RBC Count : 4.01 M/uL  Hemoglobin : 10.7 g/dL  Hematocrit : 32.5 %  Platelet Count - Automated : 191 K/uL  Mean Cell Volume : 81.0 fl  Mean Cell Hemoglobin : 26.7 pg  Mean Cell Hemoglobin Concentration : 32.9 gm/dL  Auto Neutrophil # : x  Auto Lymphocyte # : x  Auto Monocyte # : x  Auto Eosinophil # : x  Auto Basophil # : x  Auto Neutrophil % : x  Auto Lymphocyte % : x  Auto Monocyte % : x  Auto Eosinophil % : x  Auto Basophil % : x    06-11    138  |  106  |  33<H>  ----------------------------<  92  4.7   |  22  |  1.88<H>    Ca    8.5      11 Jun 2023 05:30  Phos  4.1     06-11  Mg     2.1     06-11

## 2023-06-11 NOTE — PROGRESS NOTE ADULT - PROBLEM SELECTOR PLAN 4
continue with sliding scale  5U premeal, sliding scale and 3U of lantus per endo recs  - sugars < 180

## 2023-06-11 NOTE — PROGRESS NOTE ADULT - SUBJECTIVE AND OBJECTIVE BOX
OVERNIGHT EVENTS:    SUBJECTIVE / INTERVAL HPI: Patient seen and examined at bedside.     VITAL SIGNS:  Vital Signs Last 24 Hrs  T(C): 36.7 (11 Jun 2023 04:00), Max: 37 (10 Miky 2023 09:31)  T(F): 98.1 (11 Jun 2023 04:00), Max: 98.6 (10 Miky 2023 09:31)  HR: 59 (11 Jun 2023 04:19) (59 - 66)  BP: 117/59 (11 Jun 2023 04:19) (117/59 - 177/78)  BP(mean): 75 (11 Jun 2023 04:19) (75 - 112)  RR: 19 (11 Jun 2023 04:19) (17 - 19)  SpO2: 95% (11 Jun 2023 04:19) (95% - 100%)    Parameters below as of 11 Jun 2023 04:19  Patient On (Oxygen Delivery Method): room air        06-10-23 @ 07:01  -  06-11-23 @ 07:00  --------------------------------------------------------  IN: 1097 mL / OUT: 400 mL / NET: 697 mL        PHYSICAL EXAM:    General: WDWN  HEENT: NC/AT; PERRL, anicteric sclera; MMM  Neck: supple  Cardiovascular: +S1/S2; RRR  Respiratory: CTA B/L; no W/R/R  Gastrointestinal: soft, NT/ND; +BSx4  Extremities: WWP; no edema, clubbing or cyanosis  Vascular: 2+ radial, DP/PT pulses B/L  Neurological: AAOx3; no focal deficits    MEDICATIONS:  MEDICATIONS  (STANDING):  amLODIPine   Tablet 10 milliGRAM(s) Oral daily  aspirin enteric coated 81 milliGRAM(s) Oral daily  atorvastatin 80 milliGRAM(s) Oral at bedtime  carvedilol 12.5 milliGRAM(s) Oral every 12 hours  clopidogrel Tablet 75 milliGRAM(s) Oral daily  dextrose 5%. 1000 milliLiter(s) (50 mL/Hr) IV Continuous <Continuous>  dextrose 5%. 1000 milliLiter(s) (100 mL/Hr) IV Continuous <Continuous>  dextrose 50% Injectable 25 Gram(s) IV Push once  dextrose 50% Injectable 25 Gram(s) IV Push once  dextrose 50% Injectable 12.5 Gram(s) IV Push once  donepezil 5 milliGRAM(s) Oral at bedtime  glucagon  Injectable 1 milliGRAM(s) IntraMuscular once  heparin   Injectable 7500 Unit(s) SubCutaneous every 8 hours  insulin glargine Injectable (LANTUS) 3 Unit(s) SubCutaneous at bedtime  insulin lispro (ADMELOG) corrective regimen sliding scale   SubCutaneous Before meals and at bedtime  insulin lispro Injectable (ADMELOG) 5 Unit(s) SubCutaneous three times a day before meals  lisinopril 40 milliGRAM(s) Oral daily  polyethylene glycol 3350 17 Gram(s) Oral daily  senna 2 Tablet(s) Oral at bedtime    MEDICATIONS  (PRN):  acetaminophen     Tablet .. 650 milliGRAM(s) Oral every 6 hours PRN Temp greater or equal to 38C (100.4F), Moderate Pain (4 - 6)  dextrose Oral Gel 15 Gram(s) Oral once PRN Blood Glucose LESS THAN 70 milliGRAM(s)/deciliter      ALLERGIES:  Allergies    codeine (Unknown)    Intolerances        LABS:                        10.7   6.10  )-----------( 191      ( 11 Jun 2023 05:30 )             32.5     06-10    138  |  108  |  30<H>  ----------------------------<  76  4.6   |  22  |  1.69<H>    Ca    8.5      10 Miky 2023 05:30  Phos  3.7     06-10  Mg     2.0     06-10          CAPILLARY BLOOD GLUCOSE      POCT Blood Glucose.: 103 mg/dL (11 Jun 2023 07:06)        RADIOLOGY & ADDITIONAL TESTS: Reviewed.    ASSESSMENT:    PLAN:  OVERNIGHT EVENTS: none    SUBJECTIVE / INTERVAL HPI: Patient seen and examined at bedside. No new concerns/complaints    VITAL SIGNS:  Vital Signs Last 24 Hrs  T(C): 36.7 (11 Jun 2023 04:00), Max: 37 (10 Miky 2023 09:31)  T(F): 98.1 (11 Jun 2023 04:00), Max: 98.6 (10 Miky 2023 09:31)  HR: 59 (11 Jun 2023 04:19) (59 - 66)  BP: 117/59 (11 Jun 2023 04:19) (117/59 - 177/78)  BP(mean): 75 (11 Jun 2023 04:19) (75 - 112)  RR: 19 (11 Jun 2023 04:19) (17 - 19)  SpO2: 95% (11 Jun 2023 04:19) (95% - 100%)    Parameters below as of 11 Jun 2023 04:19  Patient On (Oxygen Delivery Method): room air        06-10-23 @ 07:01  -  06-11-23 @ 07:00  --------------------------------------------------------  IN: 1097 mL / OUT: 400 mL / NET: 697 mL        PHYSICAL EXAM:    General: NAD  HEENT: NC/AT  Neck: supple  Cardiovascular: +S1/S2; RRR  Respiratory: CTA B/L; no W/R/R  Gastrointestinal: soft, NT/ND; +BSx4  Extremities: WWP; no edema, clubbing or cyanosis  Vascular: 2+ radial, DP/PT pulses B/L  Neurological: AAOx3; +left sided facial droop    MEDICATIONS:  MEDICATIONS  (STANDING):  amLODIPine   Tablet 10 milliGRAM(s) Oral daily  aspirin enteric coated 81 milliGRAM(s) Oral daily  atorvastatin 80 milliGRAM(s) Oral at bedtime  carvedilol 12.5 milliGRAM(s) Oral every 12 hours  clopidogrel Tablet 75 milliGRAM(s) Oral daily  dextrose 5%. 1000 milliLiter(s) (50 mL/Hr) IV Continuous <Continuous>  dextrose 5%. 1000 milliLiter(s) (100 mL/Hr) IV Continuous <Continuous>  dextrose 50% Injectable 25 Gram(s) IV Push once  dextrose 50% Injectable 25 Gram(s) IV Push once  dextrose 50% Injectable 12.5 Gram(s) IV Push once  donepezil 5 milliGRAM(s) Oral at bedtime  glucagon  Injectable 1 milliGRAM(s) IntraMuscular once  heparin   Injectable 7500 Unit(s) SubCutaneous every 8 hours  insulin glargine Injectable (LANTUS) 3 Unit(s) SubCutaneous at bedtime  insulin lispro (ADMELOG) corrective regimen sliding scale   SubCutaneous Before meals and at bedtime  insulin lispro Injectable (ADMELOG) 5 Unit(s) SubCutaneous three times a day before meals  lisinopril 40 milliGRAM(s) Oral daily  polyethylene glycol 3350 17 Gram(s) Oral daily  senna 2 Tablet(s) Oral at bedtime    MEDICATIONS  (PRN):  acetaminophen     Tablet .. 650 milliGRAM(s) Oral every 6 hours PRN Temp greater or equal to 38C (100.4F), Moderate Pain (4 - 6)  dextrose Oral Gel 15 Gram(s) Oral once PRN Blood Glucose LESS THAN 70 milliGRAM(s)/deciliter      ALLERGIES:  Allergies    codeine (Unknown)    Intolerances        LABS:                        10.7   6.10  )-----------( 191      ( 11 Jun 2023 05:30 )             32.5     06-10    138  |  108  |  30<H>  ----------------------------<  76  4.6   |  22  |  1.69<H>    Ca    8.5      10 Miky 2023 05:30  Phos  3.7     06-10  Mg     2.0     06-10          CAPILLARY BLOOD GLUCOSE      POCT Blood Glucose.: 103 mg/dL (11 Jun 2023 07:06)        RADIOLOGY & ADDITIONAL TESTS: Reviewed.    ASSESSMENT:    PLAN:

## 2023-06-11 NOTE — PROGRESS NOTE ADULT - PROBLEM SELECTOR PLAN 3
lisinopril 40mg daily and amlodipine 10  -coreg now increased to 12.5 bid for improved BP control  better pressures today  okay for HR parameter > 55 for coreg

## 2023-06-12 LAB
ANION GAP SERPL CALC-SCNC: 9 MMOL/L — SIGNIFICANT CHANGE UP (ref 5–17)
BUN SERPL-MCNC: 33 MG/DL — HIGH (ref 7–23)
CALCIUM SERPL-MCNC: 8.8 MG/DL — SIGNIFICANT CHANGE UP (ref 8.4–10.5)
CHLORIDE SERPL-SCNC: 107 MMOL/L — SIGNIFICANT CHANGE UP (ref 96–108)
CO2 SERPL-SCNC: 23 MMOL/L — SIGNIFICANT CHANGE UP (ref 22–31)
CREAT SERPL-MCNC: 1.81 MG/DL — HIGH (ref 0.5–1.3)
EGFR: 31 ML/MIN/1.73M2 — LOW
GLUCOSE BLDC GLUCOMTR-MCNC: 109 MG/DL — HIGH (ref 70–99)
GLUCOSE BLDC GLUCOMTR-MCNC: 112 MG/DL — HIGH (ref 70–99)
GLUCOSE BLDC GLUCOMTR-MCNC: 129 MG/DL — HIGH (ref 70–99)
GLUCOSE BLDC GLUCOMTR-MCNC: 177 MG/DL — HIGH (ref 70–99)
GLUCOSE BLDC GLUCOMTR-MCNC: 95 MG/DL — SIGNIFICANT CHANGE UP (ref 70–99)
GLUCOSE SERPL-MCNC: 134 MG/DL — HIGH (ref 70–99)
HCT VFR BLD CALC: 35 % — SIGNIFICANT CHANGE UP (ref 34.5–45)
HGB BLD-MCNC: 11.6 G/DL — SIGNIFICANT CHANGE UP (ref 11.5–15.5)
MCHC RBC-ENTMCNC: 26.7 PG — LOW (ref 27–34)
MCHC RBC-ENTMCNC: 33.1 GM/DL — SIGNIFICANT CHANGE UP (ref 32–36)
MCV RBC AUTO: 80.6 FL — SIGNIFICANT CHANGE UP (ref 80–100)
NRBC # BLD: 0 /100 WBCS — SIGNIFICANT CHANGE UP (ref 0–0)
PLATELET # BLD AUTO: 223 K/UL — SIGNIFICANT CHANGE UP (ref 150–400)
POTASSIUM SERPL-MCNC: 4.8 MMOL/L — SIGNIFICANT CHANGE UP (ref 3.5–5.3)
POTASSIUM SERPL-SCNC: 4.8 MMOL/L — SIGNIFICANT CHANGE UP (ref 3.5–5.3)
RBC # BLD: 4.34 M/UL — SIGNIFICANT CHANGE UP (ref 3.8–5.2)
RBC # FLD: 14.2 % — SIGNIFICANT CHANGE UP (ref 10.3–14.5)
SODIUM SERPL-SCNC: 139 MMOL/L — SIGNIFICANT CHANGE UP (ref 135–145)
WBC # BLD: 6.68 K/UL — SIGNIFICANT CHANGE UP (ref 3.8–10.5)
WBC # FLD AUTO: 6.68 K/UL — SIGNIFICANT CHANGE UP (ref 3.8–10.5)

## 2023-06-12 PROCEDURE — 99232 SBSQ HOSP IP/OBS MODERATE 35: CPT

## 2023-06-12 PROCEDURE — 99233 SBSQ HOSP IP/OBS HIGH 50: CPT

## 2023-06-12 RX ORDER — LIDOCAINE 4 G/100G
1 CREAM TOPICAL EVERY 24 HOURS
Refills: 0 | Status: DISCONTINUED | OUTPATIENT
Start: 2023-06-12 | End: 2023-06-13

## 2023-06-12 RX ADMIN — AMLODIPINE BESYLATE 10 MILLIGRAM(S): 2.5 TABLET ORAL at 06:20

## 2023-06-12 RX ADMIN — LIDOCAINE 1 PATCH: 4 CREAM TOPICAL at 20:48

## 2023-06-12 RX ADMIN — Medication 5 UNIT(S): at 07:20

## 2023-06-12 RX ADMIN — LIDOCAINE 1 PATCH: 4 CREAM TOPICAL at 18:12

## 2023-06-12 RX ADMIN — Medication 650 MILLIGRAM(S): at 01:36

## 2023-06-12 RX ADMIN — DONEPEZIL HYDROCHLORIDE 5 MILLIGRAM(S): 10 TABLET, FILM COATED ORAL at 21:27

## 2023-06-12 RX ADMIN — LISINOPRIL 40 MILLIGRAM(S): 2.5 TABLET ORAL at 06:20

## 2023-06-12 RX ADMIN — ATORVASTATIN CALCIUM 80 MILLIGRAM(S): 80 TABLET, FILM COATED ORAL at 21:27

## 2023-06-12 RX ADMIN — HEPARIN SODIUM 7500 UNIT(S): 5000 INJECTION INTRAVENOUS; SUBCUTANEOUS at 06:20

## 2023-06-12 RX ADMIN — Medication 5 UNIT(S): at 11:54

## 2023-06-12 RX ADMIN — Medication 2: at 16:31

## 2023-06-12 RX ADMIN — CLOPIDOGREL BISULFATE 75 MILLIGRAM(S): 75 TABLET, FILM COATED ORAL at 11:53

## 2023-06-12 RX ADMIN — LIDOCAINE 1 PATCH: 4 CREAM TOPICAL at 06:20

## 2023-06-12 RX ADMIN — Medication 81 MILLIGRAM(S): at 11:53

## 2023-06-12 RX ADMIN — HEPARIN SODIUM 7500 UNIT(S): 5000 INJECTION INTRAVENOUS; SUBCUTANEOUS at 13:17

## 2023-06-12 RX ADMIN — CARVEDILOL PHOSPHATE 12.5 MILLIGRAM(S): 80 CAPSULE, EXTENDED RELEASE ORAL at 18:14

## 2023-06-12 RX ADMIN — Medication 5 UNIT(S): at 16:30

## 2023-06-12 RX ADMIN — CARVEDILOL PHOSPHATE 12.5 MILLIGRAM(S): 80 CAPSULE, EXTENDED RELEASE ORAL at 06:20

## 2023-06-12 RX ADMIN — HEPARIN SODIUM 7500 UNIT(S): 5000 INJECTION INTRAVENOUS; SUBCUTANEOUS at 21:28

## 2023-06-12 NOTE — PROGRESS NOTE ADULT - SUBJECTIVE AND OBJECTIVE BOX
Neurology Stroke Progress Note    INTERVAL HPI/OVERNIGHT EVENTS:  No acute overnight events. Patient is still complaining of pain in the right ankle.    MEDICATIONS  (STANDING):  amLODIPine   Tablet 10 milliGRAM(s) Oral daily  aspirin enteric coated 81 milliGRAM(s) Oral daily  atorvastatin 80 milliGRAM(s) Oral at bedtime  carvedilol 12.5 milliGRAM(s) Oral every 12 hours  clopidogrel Tablet 75 milliGRAM(s) Oral daily  dextrose 5%. 1000 milliLiter(s) (100 mL/Hr) IV Continuous <Continuous>  dextrose 5%. 1000 milliLiter(s) (50 mL/Hr) IV Continuous <Continuous>  dextrose 50% Injectable 25 Gram(s) IV Push once  dextrose 50% Injectable 25 Gram(s) IV Push once  dextrose 50% Injectable 12.5 Gram(s) IV Push once  donepezil 5 milliGRAM(s) Oral at bedtime  glucagon  Injectable 1 milliGRAM(s) IntraMuscular once  heparin   Injectable 7500 Unit(s) SubCutaneous every 8 hours  insulin glargine Injectable (LANTUS) 3 Unit(s) SubCutaneous at bedtime  insulin lispro (ADMELOG) corrective regimen sliding scale   SubCutaneous Before meals and at bedtime  insulin lispro Injectable (ADMELOG) 5 Unit(s) SubCutaneous three times a day before meals  lidocaine   4% Patch 1 Patch Transdermal every 24 hours  lisinopril 40 milliGRAM(s) Oral daily  polyethylene glycol 3350 17 Gram(s) Oral daily  senna 2 Tablet(s) Oral at bedtime    MEDICATIONS  (PRN):  acetaminophen     Tablet .. 650 milliGRAM(s) Oral every 6 hours PRN Temp greater or equal to 38C (100.4F), Moderate Pain (4 - 6)  dextrose Oral Gel 15 Gram(s) Oral once PRN Blood Glucose LESS THAN 70 milliGRAM(s)/deciliter    Allergies    codeine (Unknown)    Intolerances      Vital Signs Last 24 Hrs  T(C): 37.1 (12 Jun 2023 09:14), Max: 37.1 (12 Jun 2023 09:14)  T(F): 98.7 (12 Jun 2023 09:14), Max: 98.7 (12 Jun 2023 09:14)  HR: 57 (12 Jun 2023 08:17) (57 - 70)  BP: 134/64 (12 Jun 2023 08:17) (134/64 - 174/70)  BP(mean): 91 (12 Jun 2023 08:17) (89 - 113)  RR: 16 (12 Jun 2023 08:17) (16 - 19)  SpO2: 95% (12 Jun 2023 08:17) (95% - 100%)    Parameters below as of 12 Jun 2023 08:17  Patient On (Oxygen Delivery Method): room air        Physical exam:  General: No acute distress, awake and alert  Eyes: Anicteric sclerae, moist conjunctivae, see below for CNs  Neck: trachea midline, FROM, supple, no thyromegaly or lymphadenopathy  Cardiovascular: Regular rate and rhythm, no murmurs, rubs, or gallops. No carotid bruits.   Pulmonary: Anterior breath sounds clear bilaterally, no crackles or wheezing. No use of accessory muscles  GI: Abdomen soft, non-distended, non-tender  Extremities: Radial and DP pulses +2, no edema    Neurologic:  -Mental status: Awake, alert, oriented to person, and place. Did not know the date, but knew year with choices. Speech is fluent, but slowed with intact naming, repetition, and comprehension. Some mild dysarthria secondary to residual bell's palsy. Recent and remote memory inmapired. Follows commands. Attention/concentration impaired and fund of knowledge appropriate.    -Cranial nerves:   II: Visual fields are full to confrontation.  III, IV, VI: Extraocular movements are intact without nystagmus. Pupils equally round and reactive to light  V:  Facial sensation V1-V3 diminished and 'tingly' on the left, normal on right  VII: Face is asymmetric with left-sided facial droop with no change from admission. Unable to raise left eyebrow or puff out left cheek.  VIII: Hearing is bilaterally intact to finger rub with hyperacusis on the left  IX, X: Uvula is midline and soft palate rises symmetrically. Oral thrush noted on tongue.  XI: Head turning and shoulder shrug are intact.  XII: Tongue protrudes midline  Motor: Normal bulk and tone. Some finger curl on right during pronator drift testing. Strength right upper extremity 5/5, left 4/5. Strength in right lower extremity 5/5 and left lower extremity 4/5.  Rapid alternating movements intact and symmetric  Sensation: Intact to light touch bilaterally. No neglect or extinction on double simultaneous testing.  Coordination: No dysmetria on finger-to-nose. Unable to assess heel to shin due to patient fatigue.  Reflexes: Downgoing toes bilaterally. Reflexes +2 in lower and upper extremities  Gait: Slightly widened gait and steady    LABS:                        10.7   6.10  )-----------( 191      ( 11 Jun 2023 05:30 )             32.5     06-11    138  |  106  |  33<H>  ----------------------------<  92  4.7   |  22  |  1.88<H>    Ca    8.5      11 Jun 2023 05:30  Phos  4.1     06-11  Mg     2.1     06-11            RADIOLOGY & ADDITIONAL TESTS:

## 2023-06-12 NOTE — PROGRESS NOTE ADULT - PROBLEM SELECTOR PLAN 1
Type 2 diabetes mellitus - uncontrolled with hyperglycemia  -likely medication non-compliance vs adjustment needed   Home regimen: 75/25 insulin 20 units QD, Metformin 1000mg BID and Rybelsus 3mg QD. She will likely not need insulin at discharge.  - Decrease continue lantus to  units at bedtime.   - Continue lispro to  units before each meal.  - Continue lispro moderate dose sliding scale four times daily with meals and at bedtime.  - Patient's fingerstick glucose goal is 100-180 mg/dL.    - For discharge, patient can continue metformin 1000mg BID and rybelsus 3mg daily. Stop home insulin. For acute rehab - lispro MISS before meals and at bedtime. Freestyle Ted sent to [facility] and covered. Delivered to bedside. Reviewed how to apply with daughter and patient. Did not actually apply because it would need to removed for planned PET. Will apply right before discharge.  - Patient to follow up with physician's in [place], has new endocrine appointment. Type 2 diabetes mellitus - uncontrolled with hyperglycemia  -likely medication non-compliance vs adjustment needed   Home regimen: 75/25 insulin 20 units QD, Metformin 1000mg BID and Rybelsus 3mg QD. She will likely not need insulin at discharge.  - Stop lantus at bedtime.   - Continue lispro 5 units before each meal.  - Continue lispro moderate dose sliding scale four times daily with meals and at bedtime.  - Patient's fingerstick glucose goal is 100-180 mg/dL.    - For discharge, patient can continue metformin 1000mg BID and rybelsus 3mg daily. Stop home insulin. For acute rehab - lispro MISS before meals and at bedtime. Freestyle Ted sent to [facility] and covered. Delivered to bedside. Reviewed how to apply with daughter and patient. Did not actually apply because it would need to removed for planned PET. Will apply right before discharge.  - Patient to follow up with physician's in [place], has new endocrine appointment.

## 2023-06-12 NOTE — PROGRESS NOTE ADULT - PROBLEM SELECTOR PLAN 6
Pt with BUN/Cr of 31/1.70 on admission. UA showing proteinuria and glucosuria.   -fluctuating creatinine - today up to 1.88   - BMP pending for this morning.  Was seen by nephrology, and no significant changes to management.  Can consider SGLT-2 inhibitor as outpatient  - renal ultrasound reassuringly normal  - continue with lisinopril

## 2023-06-12 NOTE — PROGRESS NOTE ADULT - NS ATTEND AMEND GEN_ALL_CORE FT
The patient is a 63-year-old female with a history of prior strokes in 2019, 2020, 2021 (felt to be cardioembolic w/o confirmed a fib; on Eliquis, compliant), CAD, hypertension, hyperlipidemia, type 2 diabetes, tobacco dependence admitted after presenting with progressive generalized weakness with MRI showing an acute right pontine, left pontomedullary junction, and right frontal periventricular ischemic infarcts.  MRA head/neck unremarkable for large vessel disease.  TTE, IWONA unrevealing apart from severe aortic plaque. PET + parotid lesion; will need OP eval with ENT. Hypercoag studies w + Anti-C/L and GP Ab screens- awaiting titers, reduced protein S, reduced protein C resistance ratio; hematology following. Will need outpatient, age-appropriate cancer screening. For now, continue DAPT, statin

## 2023-06-12 NOTE — PROGRESS NOTE ADULT - ASSESSMENT
62 y/o F with pmhx of [place] Palsy (left side), T2DM, CVA 2022 (left-sided deficits), CAD, HLD, HTN, Asthma, current smoker with a 52 pack year hx, ?AFib reportedly compliant on Eliquis who presented for evaluation of generalized weakness. Weakness started 5/21, got progressively worse 2-3 days ago. Daughter also noticed patient had been falling with occasional episodes of urinary incontinence. CTH with small vessel disease, CT C-spine negative for acute fx. Admitted to medicine for further w/u. Gen neuro consulted, recommended MRI brain w/o. MRI brain: small focus of restricted diffusion in the R petra, left pontomedullary junction and right frontal periventricular white matter. Patient transferred to stroke tele for further management. Eliquis discontinued and started asa/plavix on 6/6 as no indicated for AC. IWONA this admission w/o thrombus. EP interrogated ILR - no arrythmias. Nephro consulted for worsening CKD, will need follow up outpatient. Endo following for discharge recs. Hypercoag studies with positive Anti-C/L and GP Ab screens- awaiting titers, reduced protein S, reduced protein C resistance ratio; hematology following.    Neuro  #CVA workup  - d/c eliquis and started asa/plavix on 6/6. collateral obtained from outpt cards office: patient had a cryptogenic right thalamic stroke in 9/2019 and underwent loop recorder implant on 1/31/20 for said stroke. LR was checked in 8/2020 with no evidence of AF. 3/2021 patient admitted to [facility] for acute left basal ganglia and bilateral frontal lobe stroke. Loop recorder again showed no evidence of afib. Because stroke was suspicious for cardioembolic phenomenon the patient was started on apixaban. IWONA this admission w/o thrombus.  - continue atorvastatin 80mg daily  - q4hr stroke neuro checks and vitals  - MRI brain: small focus of restricted diffusion in the R petra, left pontomedullary junction and right frontal periventricular white matter  - Stroke Code HCT Results: small vessel disease  - MRA head without steno-occlusive disease, MRA neck without stenosis or occlusion  - B12: 447  - folate: 11.2  - kappa/lamda free light chain ratio: 1.66  - Stroke education  - EP interrogated ILR - no arrythmias  - f/u hypercoags  - PET: There is a 1 cm lesion in the left parotid gland with intense FDG uptake.  It should be noted that many benign tumors of the parotid gland can have intense FDG uptake.  - ENT consulted, no acute intervention plan to follow up outpatient for biopsy    #Cognitive impairment  - 8/30 on MOCA done at bedside, repeated 6/12 increased to 10/30  - c/w donepezil 5mg at bedtime    #urinary incontinence, ? hyperreflexia r/o cord compression   - CT C-spine: Evaluation of the parotids glands demonstrates a approximately 11 mm well-demarcated lesion within the superficial lobe of the left parotid gland (series 5 image 25). The lesion demonstrates hyperintense signal on the T2-weighted images from the cervical MRI (series 12 image 3) and is incompletely evaluated. Precise histologic diagnosis will require tissue sampling. Primary considerations include enlarged parotid lymph node as well as primary parotid neoplasm such as benign mixed tumor or Warthin's tumor.  - PET: There is a 1 cm lesion in the left parotid gland with intense FDG uptake.  It should be noted that many benign tumors of the parotid gland can have intense FDG uptake.  - MRI C/T/L spine without cord compression    Cards  #HTN  - goal sBP 120-160, gradual normotension  - c/w amlodipine 10mg daily, lisinopril 40mg daily, carvedilol 12.5mg BID w/ hold parameters  - Echocardiogram w/ Bubble and Doppler (06.06.23): Injection of agitated saline via a peripheral vein reveals no evidence of a right-to-left shunt.  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 Minimal interatrial right to left shunting noted predominantly with Valsalva suggestive of a tiny patent foramen ovale. There is severe non-mobile plaque seen in the visualized portion of the descending aorta. There is severe non-mobile plaque seen in the visualized portion of the aortic arch.  - Stroke Code EKG Results: NSR with L axis deviation and occasional Q waves but no active ischemic changes    #HLD  - high dose statin as above in CVA  - LDL results: 162    Pulm  - call provider if SPO2 < 94%    #asthma  - albuterol PRN    GI  #Nutrition/Fluids/Electrolytes   - replete K<4 and Mg <2  - Diet: Soft and bite sized  - MBS passed: soft and bite sized CC diet     Renal  #CKD   - Cr 1.7 on admission   - Will continue to trend  - collateral from PCP's office obtained:  5/2023 - Cr 1.57   9/2022 - Cr 1.38  6/2022 - Cr 1.34  - Nephro consulted for worsening CKD, renal sono neg. Recommend can continue with current antihypertensive therapy and optimize blood glucose with endocrinology. Can consider initiating SGLT-2 Inhibitor outpatient i/s/o CKD w/ proteinuria.   - Will need outpatient nephrology referral    Infectious Disease  - Stroke Code CXR results: negative    Endocrine  #DM  - A1C results: 9.8  - endo following  Home regimen: 75/25 insulin 20 units QD, Metformin 1000mg BID and Rybelsus 3mg QD. She will likely not need insulin at discharge.  - Please continue lantus 3 units at bedtime.   - Continue lispro to 5 units before each meal.  - Continue lispro moderate dose sliding scale four times daily with meals and at bedtime.  - Patient's fingerstick glucose goal is 100-180 mg/dL.    - For discharge, patient can continue metformin 1000mg BID and rybelsus 3mg daily. Stop insulin. Freestyle Ted sent to [facility] and covered. Delivered to bedside. Reviewed how to apply with daughter and patient. Did not actually apply because it would need to removed for planned PET. Will apply right before discharge.  - Patient to follow up with physician's in [place], has new endocrine appointment.    ·  Problem: Thyroid nodule.   ·  Plan: There is 2.2 x 1.9 x 1.6 cm heterogeneous predominantly isoechoic nodule in the lower pole of right lobe.  Additional multiple spongiform benign-appearing thyroid nodules in left lobe  Repeat US in 6 months to determine if FNA is needed due to size >2cm. Discussed with daughter.  TFTs normal.    Heme  - f/u hypercoag panel may not be meaningful in the setting of acute stroke with Eliquis use outpatient and DVT prophylaxis inpatient, plan to follow up titers and continue with current management for now  - + Beta 2 Glycoprotein antibody screen, f/u titers  - + Anticardiolipin antibody screen, IgA < 5, IgM 27.6, IgG < 5   - Protein S low (25)  - Protein C resistance ratio low  - Hematology consulted f/u recommendations    DVT Prophylaxis  - heparin + SCDs  - dopplers: neg    Dispo: rec'd for home PT/OT but wants to go to rehab  MOCA 8/30, started donepezil     Discussed daily hospital plans and goals with patient and stroke attending Dr. Bryanna Dela Cruz

## 2023-06-12 NOTE — PROGRESS NOTE ADULT - SUBJECTIVE AND OBJECTIVE BOX
SUBJECTIVE / INTERVAL HPI: Patient was seen and examined this morning. Events over weekend reviewed. Pre meal insulin decreased slightly for "tight" glucose.     CAPILLARY BLOOD GLUCOSE & INSULIN RECEIVED  92 mg/dL (06-11 @ 05:30)  103 mg/dL (06-11 @ 07:06) - Lispro 5  119 mg/dL (06-11 @ 11:33) - Lispro 5  138 mg/dL (06-11 @ 16:21) - Lispro 5  207 mg/dL (06-11 @ 21:19) - Lantus 3 + lispro 4  95 mg/dL (06-12 @ 06:23)  109 mg/dL (06-12 @ 07:38) - Lispro 5      REVIEW OF SYSTEMS  Constitutional:  Negative fever, chills or loss of appetite.  Eyes:  Negative blurry vision or double vision.  Cardiovascular:  Negative for chest pain or palpitations.  Respiratory:  Negative for cough, wheezing, or shortness of breath.    Gastrointestinal:  Negative for nausea, vomiting, diarrhea, constipation, or abdominal pain.  Genitourinary:  Negative frequency, urgency or dysuria.  Neurologic:  No headache, confusion, dizziness, lightheadedness. + weakness    PHYSICAL EXAM  Vital Signs Last 24 Hrs  T(C): 37.1 (12 Jun 2023 09:14), Max: 37.1 (12 Jun 2023 09:14)  T(F): 98.7 (12 Jun 2023 09:14), Max: 98.7 (12 Jun 2023 09:14)  HR: 57 (12 Jun 2023 08:17) (57 - 70)  BP: 134/64 (12 Jun 2023 08:17) (134/64 - 174/70)  BP(mean): 91 (12 Jun 2023 08:17) (89 - 113)  RR: 16 (12 Jun 2023 08:17) (16 - 19)  SpO2: 95% (12 Jun 2023 08:17) (95% - 100%)    Parameters below as of 12 Jun 2023 08:17  Patient On (Oxygen Delivery Method): room air    Constitutional: Awake, alert, in no acute distress.   HEENT: Normocephalic, atraumatic, ANDREA.  Respiratory: Lungs clear to ausculation bilaterally.   Cardiovascular: regular rhythm, normal S1 and S2, no audible murmurs.   GI: soft, non-tender, non-distended, bowel sounds present.  Extremities: No lower extremity edema.  Psychiatric: AAO x 3. Normal affect/mood.     LABS  CBC - WBC/HGB/HTC/PLT: 6.10/10.7/32.5/191 (06-11-23)  BMP - Na/K/Cl/Bicarb/BUN/Cr/Gluc/AG/eGFR: 138/4.7/106/22/33/1.88/92/10/29 (06-11-23)  Ca - 8.5 (06-11-23)  Phos - 4.1 (06-11-23)  Mg - 2.1 (06-11-23)      Thyroid Stimulating Hormone, Serum: 1.670 (06-04-23)  Total T4/Free T4: --/1.150 (06-04-23)    MEDICATIONS  MEDICATIONS  (STANDING):  amLODIPine   Tablet 10 milliGRAM(s) Oral daily  aspirin enteric coated 81 milliGRAM(s) Oral daily  atorvastatin 80 milliGRAM(s) Oral at bedtime  carvedilol 12.5 milliGRAM(s) Oral every 12 hours  clopidogrel Tablet 75 milliGRAM(s) Oral daily  dextrose 5%. 1000 milliLiter(s) (50 mL/Hr) IV Continuous <Continuous>  dextrose 5%. 1000 milliLiter(s) (100 mL/Hr) IV Continuous <Continuous>  dextrose 50% Injectable 25 Gram(s) IV Push once  dextrose 50% Injectable 25 Gram(s) IV Push once  dextrose 50% Injectable 12.5 Gram(s) IV Push once  donepezil 5 milliGRAM(s) Oral at bedtime  glucagon  Injectable 1 milliGRAM(s) IntraMuscular once  heparin   Injectable 7500 Unit(s) SubCutaneous every 8 hours  insulin glargine Injectable (LANTUS) 3 Unit(s) SubCutaneous at bedtime  insulin lispro (ADMELOG) corrective regimen sliding scale   SubCutaneous Before meals and at bedtime  insulin lispro Injectable (ADMELOG) 5 Unit(s) SubCutaneous three times a day before meals  lidocaine   4% Patch 1 Patch Transdermal every 24 hours  lisinopril 40 milliGRAM(s) Oral daily  polyethylene glycol 3350 17 Gram(s) Oral daily  senna 2 Tablet(s) Oral at bedtime    MEDICATIONS  (PRN):  acetaminophen     Tablet .. 650 milliGRAM(s) Oral every 6 hours PRN Temp greater or equal to 38C (100.4F), Moderate Pain (4 - 6)  dextrose Oral Gel 15 Gram(s) Oral once PRN Blood Glucose LESS THAN 70 milliGRAM(s)/deciliter   SUBJECTIVE / INTERVAL HPI: Patient was seen and examined this morning. Events over weekend reviewed. Pre meal insulin decreased slightly for "tight" glucose. Now planned for acute rehab v home with PT. No new complaints.    CAPILLARY BLOOD GLUCOSE & INSULIN RECEIVED  92 mg/dL (06-11 @ 05:30)  103 mg/dL (06-11 @ 07:06) - Lispro 5  119 mg/dL (06-11 @ 11:33) - Lispro 5  138 mg/dL (06-11 @ 16:21) - Lispro 5. Ate chicken, potato and fruit cocktail.  207 mg/dL (06-11 @ 21:19) - Lantus 3 + lispro 4  95 mg/dL (06-12 @ 06:23)  109 mg/dL (06-12 @ 07:38) - Lispro 5. Ate eggs and jello  112 mg/dL (06-12 @ lunch) - Lispro 5      REVIEW OF SYSTEMS  Constitutional:  Negative fever, chills or loss of appetite.  Eyes:  Negative blurry vision or double vision.  Cardiovascular:  Negative for chest pain or palpitations.  Respiratory:  Negative for cough, wheezing, or shortness of breath.    Gastrointestinal:  Negative for nausea, vomiting, diarrhea, constipation, or abdominal pain.  Genitourinary:  Negative frequency, urgency or dysuria.  Neurologic:  No headache, confusion, dizziness, lightheadedness. + weakness    PHYSICAL EXAM  Vital Signs Last 24 Hrs  T(C): 37.1 (12 Jun 2023 09:14), Max: 37.1 (12 Jun 2023 09:14)  T(F): 98.7 (12 Jun 2023 09:14), Max: 98.7 (12 Jun 2023 09:14)  HR: 57 (12 Jun 2023 08:17) (57 - 70)  BP: 134/64 (12 Jun 2023 08:17) (134/64 - 174/70)  BP(mean): 91 (12 Jun 2023 08:17) (89 - 113)  RR: 16 (12 Jun 2023 08:17) (16 - 19)  SpO2: 95% (12 Jun 2023 08:17) (95% - 100%)    Parameters below as of 12 Jun 2023 08:17  Patient On (Oxygen Delivery Method): room air    Constitutional: Awake, alert, in no acute distress.   HEENT: Normocephalic, atraumatic, ANDREA.  Respiratory: Lungs clear to ausculation bilaterally.   Cardiovascular: regular rhythm, normal S1 and S2, no audible murmurs.   GI: soft, non-tender, non-distended, bowel sounds present.  Extremities: No lower extremity edema.  Psychiatric: AAO x 3. Normal affect/mood.     LABS  CBC - WBC/HGB/HTC/PLT: 6.10/10.7/32.5/191 (06-11-23)  BMP - Na/K/Cl/Bicarb/BUN/Cr/Gluc/AG/eGFR: 138/4.7/106/22/33/1.88/92/10/29 (06-11-23)  Ca - 8.5 (06-11-23)  Phos - 4.1 (06-11-23)  Mg - 2.1 (06-11-23)      Thyroid Stimulating Hormone, Serum: 1.670 (06-04-23)  Total T4/Free T4: --/1.150 (06-04-23)    MEDICATIONS  MEDICATIONS  (STANDING):  amLODIPine   Tablet 10 milliGRAM(s) Oral daily  aspirin enteric coated 81 milliGRAM(s) Oral daily  atorvastatin 80 milliGRAM(s) Oral at bedtime  carvedilol 12.5 milliGRAM(s) Oral every 12 hours  clopidogrel Tablet 75 milliGRAM(s) Oral daily  dextrose 5%. 1000 milliLiter(s) (50 mL/Hr) IV Continuous <Continuous>  dextrose 5%. 1000 milliLiter(s) (100 mL/Hr) IV Continuous <Continuous>  dextrose 50% Injectable 25 Gram(s) IV Push once  dextrose 50% Injectable 25 Gram(s) IV Push once  dextrose 50% Injectable 12.5 Gram(s) IV Push once  donepezil 5 milliGRAM(s) Oral at bedtime  glucagon  Injectable 1 milliGRAM(s) IntraMuscular once  heparin   Injectable 7500 Unit(s) SubCutaneous every 8 hours  insulin glargine Injectable (LANTUS) 3 Unit(s) SubCutaneous at bedtime  insulin lispro (ADMELOG) corrective regimen sliding scale   SubCutaneous Before meals and at bedtime  insulin lispro Injectable (ADMELOG) 5 Unit(s) SubCutaneous three times a day before meals  lidocaine   4% Patch 1 Patch Transdermal every 24 hours  lisinopril 40 milliGRAM(s) Oral daily  polyethylene glycol 3350 17 Gram(s) Oral daily  senna 2 Tablet(s) Oral at bedtime    MEDICATIONS  (PRN):  acetaminophen     Tablet .. 650 milliGRAM(s) Oral every 6 hours PRN Temp greater or equal to 38C (100.4F), Moderate Pain (4 - 6)  dextrose Oral Gel 15 Gram(s) Oral once PRN Blood Glucose LESS THAN 70 milliGRAM(s)/deciliter

## 2023-06-12 NOTE — PROGRESS NOTE ADULT - ASSESSMENT
Ms. Hendricks is a 63-year-old female with a past medical history of type 2 diabetes mellitus, hypertension, hyperlipidemia, Bell's Palsy (left-sided), CVA (2022 with left-sided residual deficits), coronary artery disease, atrial fibrillation and asthma who presented for evaluation of generalized weakness. She was found to have right pontine infarct. Endocrinology has been following for diabetes management.    A1C: 9.8 %  BUN: 33  Creatinine: 1.88  GFR: 29  Weight: 96.4  BMI: 41.5  EF: normal

## 2023-06-12 NOTE — PROGRESS NOTE ADULT - SUBJECTIVE AND OBJECTIVE BOX
Feeling okay today.  Says her brain still doesn't feel right, and is anxious about being at home.  Wants to know if things will get back to normal.  Denies any new or worsening symptoms.     Remaining ROS negative     PHYSICAL EXAM:    General: WDWN  HEENT: NC/AT; MMM, EOMI  Cardiovascular: +S1/S2, RRR, no mrg  Respiratory: CTA B/L; no W/R/R  Gastrointestinal: soft, NT/ND; +BSx4  Extremities: WWP; slight RLE>LLE edema, nonpitting  Psychiatric: pleasant mood and affect  Dermatologic: no appreciable wounds or damage to the skin    VITAL SIGNS:  Vital Signs Last 24 Hrs  T(C): 37.1 (12 Jun 2023 09:14), Max: 37.1 (12 Jun 2023 09:14)  T(F): 98.7 (12 Jun 2023 09:14), Max: 98.7 (12 Jun 2023 09:14)  HR: 57 (12 Jun 2023 08:17) (57 - 70)  BP: 134/64 (12 Jun 2023 08:17) (134/64 - 174/70)  BP(mean): 91 (12 Jun 2023 08:17) (89 - 113)  RR: 16 (12 Jun 2023 08:17) (16 - 19)  SpO2: 95% (12 Jun 2023 08:17) (95% - 100%)    Parameters below as of 12 Jun 2023 08:17  Patient On (Oxygen Delivery Method): room air          MEDICATIONS:  MEDICATIONS  (STANDING):  amLODIPine   Tablet 10 milliGRAM(s) Oral daily  aspirin enteric coated 81 milliGRAM(s) Oral daily  atorvastatin 80 milliGRAM(s) Oral at bedtime  carvedilol 12.5 milliGRAM(s) Oral every 12 hours  clopidogrel Tablet 75 milliGRAM(s) Oral daily  dextrose 5%. 1000 milliLiter(s) (50 mL/Hr) IV Continuous <Continuous>  dextrose 5%. 1000 milliLiter(s) (100 mL/Hr) IV Continuous <Continuous>  dextrose 50% Injectable 25 Gram(s) IV Push once  dextrose 50% Injectable 25 Gram(s) IV Push once  dextrose 50% Injectable 12.5 Gram(s) IV Push once  donepezil 5 milliGRAM(s) Oral at bedtime  glucagon  Injectable 1 milliGRAM(s) IntraMuscular once  heparin   Injectable 7500 Unit(s) SubCutaneous every 8 hours  insulin glargine Injectable (LANTUS) 3 Unit(s) SubCutaneous at bedtime  insulin lispro (ADMELOG) corrective regimen sliding scale   SubCutaneous Before meals and at bedtime  insulin lispro Injectable (ADMELOG) 5 Unit(s) SubCutaneous three times a day before meals  lidocaine   4% Patch 1 Patch Transdermal every 24 hours  lisinopril 40 milliGRAM(s) Oral daily  polyethylene glycol 3350 17 Gram(s) Oral daily  senna 2 Tablet(s) Oral at bedtime    MEDICATIONS  (PRN):  acetaminophen     Tablet .. 650 milliGRAM(s) Oral every 6 hours PRN Temp greater or equal to 38C (100.4F), Moderate Pain (4 - 6)  dextrose Oral Gel 15 Gram(s) Oral once PRN Blood Glucose LESS THAN 70 milliGRAM(s)/deciliter      ALLERGIES:  Allergies    codeine (Unknown)    Intolerances        LABS:                        10.7   6.10  )-----------( 191      ( 11 Jun 2023 05:30 )             32.5     06-11    138  |  106  |  33<H>  ----------------------------<  92  4.7   |  22  |  1.88<H>    Ca    8.5      11 Jun 2023 05:30  Phos  4.1     06-11  Mg     2.1     06-11          CAPILLARY BLOOD GLUCOSE      POCT Blood Glucose.: 112 mg/dL (12 Jun 2023 11:10)      RADIOLOGY & ADDITIONAL TESTS: Reviewed.

## 2023-06-12 NOTE — PROGRESS NOTE ADULT - ASSESSMENT
63F with PMH of [place] Palsy, DM2, CVA (left side weak), CAD, HLD, HTN, Asthma noted to have acute R pontine stroke.  On stroke service with medical comanagment

## 2023-06-12 NOTE — PROGRESS NOTE ADULT - PROBLEM SELECTOR PLAN 1
MRI with short stroke protocol revealed acute ischemia in the right petra.  - eliquis discontinued - no DVT on dopplers and no cardiac thrombus  - continue on dual antiplatelet therapy    #coagulability abnormalities   -low protein S free activity assay and activated protein C resistance ratio as well as + anticardiolipin, [id number]  -heme following - appreciate recs  - added lupus anticoagulant - as this would - would likely require lovenox with bridge to coumadin    #left parotid mass -- abnormal uptake on PET - may be benign or malignant, outpatient ENT f/u with Dr. Ortega for FNA

## 2023-06-13 ENCOUNTER — TRANSCRIPTION ENCOUNTER (OUTPATIENT)
Age: 63
End: 2023-06-13

## 2023-06-13 VITALS — TEMPERATURE: 98 F

## 2023-06-13 LAB
ANION GAP SERPL CALC-SCNC: 7 MMOL/L — SIGNIFICANT CHANGE UP (ref 5–17)
B2 GLYCOPROT1 IGA SER QL: 7.7 SAU — SIGNIFICANT CHANGE UP
B2 GLYCOPROT1 IGG SER-ACNC: <5 SGU — SIGNIFICANT CHANGE UP
B2 GLYCOPROT1 IGM SER-ACNC: 15.2 SMU — SIGNIFICANT CHANGE UP
BUN SERPL-MCNC: 27 MG/DL — HIGH (ref 7–23)
CALCIUM SERPL-MCNC: 9.2 MG/DL — SIGNIFICANT CHANGE UP (ref 8.4–10.5)
CHLORIDE SERPL-SCNC: 106 MMOL/L — SIGNIFICANT CHANGE UP (ref 96–108)
CO2 SERPL-SCNC: 25 MMOL/L — SIGNIFICANT CHANGE UP (ref 22–31)
CONFIRM APTT STACLOT: NEGATIVE — SIGNIFICANT CHANGE UP
CREAT SERPL-MCNC: 1.79 MG/DL — HIGH (ref 0.5–1.3)
DNA PLOIDY SPEC FC-IMP: SIGNIFICANT CHANGE UP
DRVVT RATIO: 0.78 RATIO — SIGNIFICANT CHANGE UP (ref 0–1.03)
DRVVT SCREEN TO CONFIRM RATIO: SIGNIFICANT CHANGE UP
EGFR: 31 ML/MIN/1.73M2 — LOW
GLUCOSE BLDC GLUCOMTR-MCNC: 115 MG/DL — HIGH (ref 70–99)
GLUCOSE BLDC GLUCOMTR-MCNC: 117 MG/DL — HIGH (ref 70–99)
GLUCOSE BLDC GLUCOMTR-MCNC: 117 MG/DL — HIGH (ref 70–99)
GLUCOSE SERPL-MCNC: 130 MG/DL — HIGH (ref 70–99)
HCT VFR BLD CALC: 37.2 % — SIGNIFICANT CHANGE UP (ref 34.5–45)
HGB BLD-MCNC: 12.3 G/DL — SIGNIFICANT CHANGE UP (ref 11.5–15.5)
MCHC RBC-ENTMCNC: 26.6 PG — LOW (ref 27–34)
MCHC RBC-ENTMCNC: 33.1 GM/DL — SIGNIFICANT CHANGE UP (ref 32–36)
MCV RBC AUTO: 80.5 FL — SIGNIFICANT CHANGE UP (ref 80–100)
NRBC # BLD: 0 /100 WBCS — SIGNIFICANT CHANGE UP (ref 0–0)
PLATELET # BLD AUTO: 237 K/UL — SIGNIFICANT CHANGE UP (ref 150–400)
POTASSIUM SERPL-MCNC: 4.6 MMOL/L — SIGNIFICANT CHANGE UP (ref 3.5–5.3)
POTASSIUM SERPL-SCNC: 4.6 MMOL/L — SIGNIFICANT CHANGE UP (ref 3.5–5.3)
PTR INTERPRETATION: SIGNIFICANT CHANGE UP
RBC # BLD: 4.62 M/UL — SIGNIFICANT CHANGE UP (ref 3.8–5.2)
RBC # FLD: 14.5 % — SIGNIFICANT CHANGE UP (ref 10.3–14.5)
SODIUM SERPL-SCNC: 138 MMOL/L — SIGNIFICANT CHANGE UP (ref 135–145)
WBC # BLD: 7.31 K/UL — SIGNIFICANT CHANGE UP (ref 3.8–10.5)
WBC # FLD AUTO: 7.31 K/UL — SIGNIFICANT CHANGE UP (ref 3.8–10.5)

## 2023-06-13 PROCEDURE — 97530 THERAPEUTIC ACTIVITIES: CPT

## 2023-06-13 PROCEDURE — 85025 COMPLETE CBC W/AUTO DIFF WBC: CPT

## 2023-06-13 PROCEDURE — 97162 PT EVAL MOD COMPLEX 30 MIN: CPT

## 2023-06-13 PROCEDURE — 81001 URINALYSIS AUTO W/SCOPE: CPT

## 2023-06-13 PROCEDURE — 70544 MR ANGIOGRAPHY HEAD W/O DYE: CPT

## 2023-06-13 PROCEDURE — 81240 F2 GENE: CPT

## 2023-06-13 PROCEDURE — 99232 SBSQ HOSP IP/OBS MODERATE 35: CPT

## 2023-06-13 PROCEDURE — 83880 ASSAY OF NATRIURETIC PEPTIDE: CPT

## 2023-06-13 PROCEDURE — 83090 ASSAY OF HOMOCYSTEINE: CPT

## 2023-06-13 PROCEDURE — 72148 MRI LUMBAR SPINE W/O DYE: CPT

## 2023-06-13 PROCEDURE — 99239 HOSP IP/OBS DSCHRG MGMT >30: CPT

## 2023-06-13 PROCEDURE — 86803 HEPATITIS C AB TEST: CPT

## 2023-06-13 PROCEDURE — 85613 RUSSELL VIPER VENOM DILUTED: CPT

## 2023-06-13 PROCEDURE — 84443 ASSAY THYROID STIM HORMONE: CPT

## 2023-06-13 PROCEDURE — 84484 ASSAY OF TROPONIN QUANT: CPT

## 2023-06-13 PROCEDURE — 85303 CLOT INHIBIT PROT C ACTIVITY: CPT

## 2023-06-13 PROCEDURE — 86850 RBC ANTIBODY SCREEN: CPT

## 2023-06-13 PROCEDURE — 72141 MRI NECK SPINE W/O DYE: CPT

## 2023-06-13 PROCEDURE — 74230 X-RAY XM SWLNG FUNCJ C+: CPT

## 2023-06-13 PROCEDURE — 97116 GAIT TRAINING THERAPY: CPT

## 2023-06-13 PROCEDURE — 83735 ASSAY OF MAGNESIUM: CPT

## 2023-06-13 PROCEDURE — 99285 EMERGENCY DEPT VISIT HI MDM: CPT

## 2023-06-13 PROCEDURE — 82607 VITAMIN B-12: CPT

## 2023-06-13 PROCEDURE — 85652 RBC SED RATE AUTOMATED: CPT

## 2023-06-13 PROCEDURE — 70450 CT HEAD/BRAIN W/O DYE: CPT | Mod: MA

## 2023-06-13 PROCEDURE — 78815 PET IMAGE W/CT SKULL-THIGH: CPT

## 2023-06-13 PROCEDURE — 36415 COLL VENOUS BLD VENIPUNCTURE: CPT

## 2023-06-13 PROCEDURE — 83935 ASSAY OF URINE OSMOLALITY: CPT

## 2023-06-13 PROCEDURE — 80061 LIPID PANEL: CPT

## 2023-06-13 PROCEDURE — A9552: CPT

## 2023-06-13 PROCEDURE — 85307 ASSAY ACTIVATED PROTEIN C: CPT

## 2023-06-13 PROCEDURE — 70551 MRI BRAIN STEM W/O DYE: CPT

## 2023-06-13 PROCEDURE — 86140 C-REACTIVE PROTEIN: CPT

## 2023-06-13 PROCEDURE — 82746 ASSAY OF FOLIC ACID SERUM: CPT

## 2023-06-13 PROCEDURE — 84100 ASSAY OF PHOSPHORUS: CPT

## 2023-06-13 PROCEDURE — 86900 BLOOD TYPING SEROLOGIC ABO: CPT

## 2023-06-13 PROCEDURE — 83036 HEMOGLOBIN GLYCOSYLATED A1C: CPT

## 2023-06-13 PROCEDURE — 80053 COMPREHEN METABOLIC PANEL: CPT

## 2023-06-13 PROCEDURE — 87086 URINE CULTURE/COLONY COUNT: CPT

## 2023-06-13 PROCEDURE — 86592 SYPHILIS TEST NON-TREP QUAL: CPT

## 2023-06-13 PROCEDURE — 97110 THERAPEUTIC EXERCISES: CPT

## 2023-06-13 PROCEDURE — 83921 ORGANIC ACID SINGLE QUANT: CPT

## 2023-06-13 PROCEDURE — 84300 ASSAY OF URINE SODIUM: CPT

## 2023-06-13 PROCEDURE — 81241 F5 GENE: CPT

## 2023-06-13 PROCEDURE — 72146 MRI CHEST SPINE W/O DYE: CPT

## 2023-06-13 PROCEDURE — 84439 ASSAY OF FREE THYROXINE: CPT

## 2023-06-13 PROCEDURE — 93312 ECHO TRANSESOPHAGEAL: CPT

## 2023-06-13 PROCEDURE — 84156 ASSAY OF PROTEIN URINE: CPT

## 2023-06-13 PROCEDURE — 85306 CLOT INHIBIT PROT S FREE: CPT

## 2023-06-13 PROCEDURE — 84133 ASSAY OF URINE POTASSIUM: CPT

## 2023-06-13 PROCEDURE — 86147 CARDIOLIPIN ANTIBODY EA IG: CPT

## 2023-06-13 PROCEDURE — 93005 ELECTROCARDIOGRAM TRACING: CPT

## 2023-06-13 PROCEDURE — 76536 US EXAM OF HEAD AND NECK: CPT

## 2023-06-13 PROCEDURE — 86901 BLOOD TYPING SEROLOGIC RH(D): CPT

## 2023-06-13 PROCEDURE — 84540 ASSAY OF URINE/UREA-N: CPT

## 2023-06-13 PROCEDURE — 82570 ASSAY OF URINE CREATININE: CPT

## 2023-06-13 PROCEDURE — 72125 CT NECK SPINE W/O DYE: CPT | Mod: MA

## 2023-06-13 PROCEDURE — 97535 SELF CARE MNGMENT TRAINING: CPT

## 2023-06-13 PROCEDURE — 86780 TREPONEMA PALLIDUM: CPT

## 2023-06-13 PROCEDURE — 82947 ASSAY GLUCOSE BLOOD QUANT: CPT

## 2023-06-13 PROCEDURE — 93970 EXTREMITY STUDY: CPT

## 2023-06-13 PROCEDURE — 92610 EVALUATE SWALLOWING FUNCTION: CPT

## 2023-06-13 PROCEDURE — 71045 X-RAY EXAM CHEST 1 VIEW: CPT

## 2023-06-13 PROCEDURE — 86146 BETA-2 GLYCOPROTEIN ANTIBODY: CPT

## 2023-06-13 PROCEDURE — 85027 COMPLETE CBC AUTOMATED: CPT

## 2023-06-13 PROCEDURE — 76775 US EXAM ABDO BACK WALL LIM: CPT

## 2023-06-13 PROCEDURE — 80048 BASIC METABOLIC PNL TOTAL CA: CPT

## 2023-06-13 PROCEDURE — 82962 GLUCOSE BLOOD TEST: CPT

## 2023-06-13 PROCEDURE — 85300 ANTITHROMBIN III ACTIVITY: CPT

## 2023-06-13 PROCEDURE — 84165 PROTEIN E-PHORESIS SERUM: CPT

## 2023-06-13 PROCEDURE — 70547 MR ANGIOGRAPHY NECK W/O DYE: CPT

## 2023-06-13 PROCEDURE — 85598 HEXAGNAL PHOSPH PLTLT NEUTRL: CPT

## 2023-06-13 PROCEDURE — 97166 OT EVAL MOD COMPLEX 45 MIN: CPT

## 2023-06-13 PROCEDURE — 93306 TTE W/DOPPLER COMPLETE: CPT

## 2023-06-13 RX ORDER — MEMANTINE HYDROCHLORIDE 10 MG/1
1 TABLET ORAL
Refills: 0 | DISCHARGE

## 2023-06-13 RX ORDER — MEMANTINE HYDROCHLORIDE 10 MG/1
1 TABLET ORAL
Qty: 31 | Refills: 3
Start: 2023-06-13 | End: 2023-10-14

## 2023-06-13 RX ORDER — CARVEDILOL PHOSPHATE 80 MG/1
1 CAPSULE, EXTENDED RELEASE ORAL
Qty: 0 | Refills: 0 | DISCHARGE
Start: 2023-06-13

## 2023-06-13 RX ORDER — CARVEDILOL PHOSPHATE 80 MG/1
1 CAPSULE, EXTENDED RELEASE ORAL
Qty: 62 | Refills: 3
Start: 2023-06-13 | End: 2023-10-14

## 2023-06-13 RX ORDER — ATORVASTATIN CALCIUM 80 MG/1
1 TABLET, FILM COATED ORAL
Qty: 31 | Refills: 3
Start: 2023-06-13 | End: 2023-10-14

## 2023-06-13 RX ORDER — DONEPEZIL HYDROCHLORIDE 10 MG/1
1 TABLET, FILM COATED ORAL
Qty: 0 | Refills: 0 | DISCHARGE
Start: 2023-06-13

## 2023-06-13 RX ORDER — LIDOCAINE 4 G/100G
2 CREAM TOPICAL
Qty: 3 | Refills: 1
Start: 2023-06-13 | End: 2023-08-13

## 2023-06-13 RX ORDER — LISINOPRIL 2.5 MG/1
1 TABLET ORAL
Qty: 0 | Refills: 0 | DISCHARGE
Start: 2023-06-13

## 2023-06-13 RX ORDER — FUROSEMIDE 40 MG
1 TABLET ORAL
Qty: 31 | Refills: 3
Start: 2023-06-13 | End: 2023-10-14

## 2023-06-13 RX ORDER — METFORMIN HYDROCHLORIDE 850 MG/1
1 TABLET ORAL
Refills: 0 | DISCHARGE

## 2023-06-13 RX ORDER — INSULIN LISPRO 100/ML
25 VIAL (ML) SUBCUTANEOUS
Refills: 0 | DISCHARGE

## 2023-06-13 RX ORDER — LISINOPRIL 2.5 MG/1
1 TABLET ORAL
Qty: 31 | Refills: 3
Start: 2023-06-13

## 2023-06-13 RX ORDER — AMLODIPINE BESYLATE 2.5 MG/1
1 TABLET ORAL
Refills: 0 | DISCHARGE

## 2023-06-13 RX ORDER — AMLODIPINE BESYLATE 2.5 MG/1
1 TABLET ORAL
Qty: 31 | Refills: 3
Start: 2023-06-13 | End: 2023-10-14

## 2023-06-13 RX ORDER — CLOPIDOGREL BISULFATE 75 MG/1
1 TABLET, FILM COATED ORAL
Qty: 31 | Refills: 3
Start: 2023-06-13 | End: 2023-10-14

## 2023-06-13 RX ORDER — LIDOCAINE 4 G/100G
0 CREAM TOPICAL
Qty: 0 | Refills: 0 | DISCHARGE
Start: 2023-06-13

## 2023-06-13 RX ORDER — METOPROLOL TARTRATE 50 MG
1 TABLET ORAL
Refills: 0 | DISCHARGE

## 2023-06-13 RX ORDER — ATORVASTATIN CALCIUM 80 MG/1
1 TABLET, FILM COATED ORAL
Qty: 0 | Refills: 0 | DISCHARGE
Start: 2023-06-13

## 2023-06-13 RX ORDER — LIDOCAINE 4 G/100G
1 CREAM TOPICAL
Qty: 0 | Refills: 0 | DISCHARGE
Start: 2023-06-13

## 2023-06-13 RX ORDER — SEMAGLUTIDE 0.68 MG/ML
1 INJECTION, SOLUTION SUBCUTANEOUS
Qty: 30 | Refills: 3
Start: 2023-06-13 | End: 2023-10-10

## 2023-06-13 RX ORDER — LISINOPRIL 2.5 MG/1
1 TABLET ORAL
Refills: 0 | DISCHARGE

## 2023-06-13 RX ORDER — CLOPIDOGREL BISULFATE 75 MG/1
1 TABLET, FILM COATED ORAL
Qty: 0 | Refills: 0 | DISCHARGE
Start: 2023-06-13

## 2023-06-13 RX ORDER — ATORVASTATIN CALCIUM 80 MG/1
1 TABLET, FILM COATED ORAL
Refills: 0 | DISCHARGE

## 2023-06-13 RX ORDER — SEMAGLUTIDE 0.68 MG/ML
1 INJECTION, SOLUTION SUBCUTANEOUS
Refills: 0 | DISCHARGE

## 2023-06-13 RX ORDER — ASPIRIN/CALCIUM CARB/MAGNESIUM 324 MG
1 TABLET ORAL
Qty: 21 | Refills: 0
Start: 2023-06-13 | End: 2023-07-03

## 2023-06-13 RX ORDER — METFORMIN HYDROCHLORIDE 850 MG/1
1 TABLET ORAL
Qty: 62 | Refills: 3
Start: 2023-06-13 | End: 2023-10-14

## 2023-06-13 RX ORDER — FUROSEMIDE 40 MG
20 TABLET ORAL DAILY
Refills: 0 | Status: DISCONTINUED | OUTPATIENT
Start: 2023-06-13 | End: 2023-06-13

## 2023-06-13 RX ORDER — ASPIRIN/CALCIUM CARB/MAGNESIUM 324 MG
1 TABLET ORAL
Qty: 0 | Refills: 0 | DISCHARGE
Start: 2023-06-13

## 2023-06-13 RX ORDER — APIXABAN 2.5 MG/1
1 TABLET, FILM COATED ORAL
Refills: 0 | DISCHARGE

## 2023-06-13 RX ADMIN — LIDOCAINE 1 PATCH: 4 CREAM TOPICAL at 05:46

## 2023-06-13 RX ADMIN — Medication 20 MILLIGRAM(S): at 15:22

## 2023-06-13 RX ADMIN — Medication 5 UNIT(S): at 16:38

## 2023-06-13 RX ADMIN — Medication 81 MILLIGRAM(S): at 11:30

## 2023-06-13 RX ADMIN — Medication 5 UNIT(S): at 06:45

## 2023-06-13 RX ADMIN — CARVEDILOL PHOSPHATE 12.5 MILLIGRAM(S): 80 CAPSULE, EXTENDED RELEASE ORAL at 05:45

## 2023-06-13 RX ADMIN — CARVEDILOL PHOSPHATE 12.5 MILLIGRAM(S): 80 CAPSULE, EXTENDED RELEASE ORAL at 17:08

## 2023-06-13 RX ADMIN — LIDOCAINE 1 PATCH: 4 CREAM TOPICAL at 17:17

## 2023-06-13 RX ADMIN — LISINOPRIL 40 MILLIGRAM(S): 2.5 TABLET ORAL at 05:45

## 2023-06-13 RX ADMIN — Medication 5 UNIT(S): at 11:28

## 2023-06-13 RX ADMIN — CLOPIDOGREL BISULFATE 75 MILLIGRAM(S): 75 TABLET, FILM COATED ORAL at 11:30

## 2023-06-13 RX ADMIN — AMLODIPINE BESYLATE 10 MILLIGRAM(S): 2.5 TABLET ORAL at 05:45

## 2023-06-13 RX ADMIN — HEPARIN SODIUM 7500 UNIT(S): 5000 INJECTION INTRAVENOUS; SUBCUTANEOUS at 05:45

## 2023-06-13 NOTE — DISCHARGE NOTE NURSING/CASE MANAGEMENT/SOCIAL WORK - NSSCNAMETXT_GEN_ALL_CORE
[facility] (formerly [facility] - [facility])

## 2023-06-13 NOTE — PROGRESS NOTE ADULT - PROBLEM SELECTOR PROBLEM 1
Right pontine stroke
Right pontine stroke
DM (diabetes mellitus)
Right pontine stroke
Right pontine stroke
DM (diabetes mellitus)
Right pontine stroke

## 2023-06-13 NOTE — PROGRESS NOTE ADULT - PROBLEM SELECTOR PLAN 1
Type 2 diabetes mellitus - uncontrolled with hyperglycemia  -likely medication non-compliance vs adjustment needed   Home regimen: 75/25 insulin 20 units QD, Metformin 1000mg BID and Rybelsus 3mg QD. She will likely not need insulin at discharge.  - Stop lantus at bedtime.   - Continue lispro 5 units before each meal.  - Continue lispro moderate dose sliding scale four times daily with meals and at bedtime.  - Patient's fingerstick glucose goal is 100-180 mg/dL.    - For discharge, patient can continue metformin 1000mg BID and rybelsus 3mg daily. Stop home insulin. For acute rehab - lispro MISS before meals and at bedtime. Freestyle Ted sent to [facility] and covered. Delivered to bedside. Reviewed how to apply with daughter and patient. Did not actually apply because it would need to removed for planned PET. Will apply right before discharge.  - Patient to follow up with physician's in [place], has new endocrine appointment. Type 2 diabetes mellitus - uncontrolled with hyperglycemia  -likely medication non-compliance vs adjustment needed   Home regimen: 75/25 insulin 20 units QD, Metformin 1000mg BID and Rybelsus 3mg QD. She will likely not need insulin at discharge.  - No Lantus  - Continue lispro 5 units before each meal.  - Continue lispro moderate dose sliding scale four times daily with meals and at bedtime.  - Patient's fingerstick glucose goal is 100-180 mg/dL.    - For discharge, patient can continue metformin 1000mg BID and rybelsus 3mg daily. Stop home insulin. For acute rehab - lispro MISS before meals and at bedtime. Freestyle Ted sent to [employer] and covered. Delivered to bedside. Reviewed how to apply with daughter and patient. Placed ted sensor to left upper arm and connected to phone [employer]. Continue FSG monitoring per protocol.  - Patient to follow up with physician's in [place], has new endocrine appointment. Type 2 diabetes mellitus - uncontrolled with hyperglycemia  -likely medication non-compliance vs adjustment needed   Home regimen: 75/25 insulin 20 units QD, Metformin 1000mg BID and Rybelsus 3mg QD. She will likely not need insulin at discharge.  - No Lantus  - Continue lispro 5 units before each meal.  - Continue lispro moderate dose sliding scale four times daily with meals and at bedtime.  - Patient's fingerstick glucose goal is 100-180 mg/dL.    - For discharge, patient can continue metformin 1000mg BID and rybelsus 3mg daily. Stop home insulin. For acute rehab - lispro MISS before meals and at bedtime. Freestyle Ted sent to [employer] and covered. Delivered to bedside. Reviewed how to apply with daughter and patient. Placed ted sensor to left upper arm and connected to phone [employer]. Continue FSG monitoring per protocol.  Of note, sensor did not connect to [employer]. It appears there is some blood under the sensor (likely from blood thinners as she has large areas of ecchymosis on upper arms), which is interfering with the sensor. Will send Rx to her retail pharmacy, and advised daughter to contact ted for a replacement sensor to be sent.  - Patient to follow up with physician's in [place], has new endocrine appointment.

## 2023-06-13 NOTE — PROGRESS NOTE ADULT - NSPROGADDITIONALINFOA_GEN_ALL_CORE
>35 minutes spent on this encounter, including face to face with patient, care coordination and documentation
Additional Information: Vascular Neurology Fellow Attestation:  Patient seen and examined with stroke team and agree with above. 62yo woman w/ multiple prior embolic-appearing infarcts, previously on Eliquis w/o confirmed afib, presented with generalized weakness, found to have multiple bilateral acute infarcts, presumed secondary to ESUS.   -ILR interrogated w/o evidence of afib.   -Hypercoagulable workup sent, positive B-glycoprotein screen, anti-cardiolipin Ab, decreased Protein S, and low protein C resistance ratio, appreciate hematology recs regarding necessary confirmatory testing and indication for therapeutic anticoagulation, awaiting further results  -s/p PET scan to screen for occult malignancy to suggest underlying hypercoagulable state, notable for parotid mass, ENT consulted and recommend outpatient followup for biopsy   -In the meantime, no indication for anticoagulation, started DAPT for 21 days followed by aspirin alone and statin for secondary stroke prevention.  -Appreciate nephrology recs for worsening kidney function, no indication for further inpatient workup or treatment, will f/u outpatient for consideration of SGLT2 inhibitor treatment  -Coreg increased 6/9 and 6/10  -Appreciate endocrinology recs for DM management     Discussed with attending, Dr. Martinez
Vascular Neurology Fellow Attestation:  Patient seen and examined with stroke team and agree with above. 62yo woman w/ multiple prior embolic-appearing infarcts, previously on Eliquis w/o confirmed afib, presented with generalized weakness, found to have multiple bilateral acute infarcts, presumed secondary to ESUS.   -ILR interrogated w/o evidence of afib.   -Hypercoagulable workup sent, positive B-glycoprotein screen, anti-cardiolipin Ab, decreased Protein S, and low protein C resistance ratio, will consult hematology regarding necessary confirmatory testing and indication for therapeutic anticoagulation  -s/p PET scan to screen for occult malignancy to suggest underlying hypercoagulable state, notable for parotid mass, ENT consulted and recommend outpatient followup for biopsy   -In the meantime, no indication for anticoagulation, started DAPT for 21 days followed by aspirin alone and statin for secondary stroke prevention.  -Appreciate nephrology recs for worsening kidney function, no indication for further inpatient workup or treatment, will f/u outpatient for consideration of SGLT2 inhibitor treatment  -Coreg increased 6/9 for increased BP control, will monitor today and consider further increase if still peristently hypertensive  -Appreciate endocrinology recs for DM management     Discussed with attending, Dr. Martinez
Vascular Neurology Fellow Attestation:  Patient seen and examined with stroke team and agree with above. 64yo woman w/ multiple prior embolic-appearing infarcts, previously on Eliquis w/o confirmed afib, presented with generalized weakness, found to have multiple bilateral acute infarcts, presumed secondary to ESUS.   -ILR interrogated w/o evidence of afib.   -Pending hypercoagulable workup and PET scan to screen for occult malignancy to suggest underlying hypercoagulable state.   -In the meantime, no indication for anticoagulation, started DAPT for 21 days followed by aspirin alone and statin for secondary stroke prevention.  -Appreciate nephrology recs for worsening kidney function, pending renal u/s and urine studies  -Appreciate endocrinology recs for DM management     Discussed with attending, Dr. Dela Cruz
Vascular Neurology Fellow Attestation:  Patient seen and examined with stroke team and agree with above. 64yo woman w/ multiple prior embolic-appearing infarcts, previously on Eliquis w/o confirmed afib, presented with generalized weakness, found to have multiple bilateral acute infarcts, presumed secondary to ESUS.   -ILR interrogated w/o evidence of afib.   -Hypercoagulable workup sent, results so far negative  -s/p PET scan to screen for occult malignancy to suggest underlying hypercoagulable state, notable for parotid mass, will get ENT consult   -In the meantime, no indication for anticoagulation, started DAPT for 21 days followed by aspirin alone and statin for secondary stroke prevention.  -Appreciate nephrology recs for worsening kidney function, no indication for further inpatient workup or treatment, will f/u outpatient for consideration of SGLT2 inhibitor treatment  -Coreg increased this morning for increased BP control  -Appreciate endocrinology recs for DM management     Discussed with attending, Dr. Dela Cruz
>35 minutes spent on this encounter, including face to face with patient, care coordination and documentation.
#Thyroid nodule  - TSH and thyroxine within normal limits  Thyroid ultrasound: "Dominant right thyroid lower pole nodule. Consider follow-up ultrasound   in one year.    Additional multiple spongiform benign-appearing thyroid nodules."  - outpatient follow up.  Patient has an endocrinologist in [place] and can follow up for both diabetes and thyroid nodule.

## 2023-06-13 NOTE — DISCHARGE NOTE NURSING/CASE MANAGEMENT/SOCIAL WORK - NSDCFUADDAPPT_GEN_ALL_CORE_FT
Stroke office will call to schedule an appointment with a member of the stroke team.     Follow up with Dr. Carissa Ortega at [facility] for further work up of parotid lesion     Follow up with PCP for age appropriate cancer screening (mammogram, colonoscopy)     Follow up with outpatient nephrology for CKD (Jennifer Justice)    Follow up with Dr. Cary for you Diabetes management and repeat thyroid ultrasound in 6 months

## 2023-06-13 NOTE — DISCHARGE NOTE NURSING/CASE MANAGEMENT/SOCIAL WORK - PATIENT PORTAL LINK FT
You can access the FollowMyHealth Patient Portal offered by [facility] by registering at the following website: [email or web address]. By joining [email or web address] FollowMyHealth portal, you will also be able to view your health information using other applications (apps) compatible with our system.

## 2023-06-13 NOTE — PROGRESS NOTE ADULT - PROVIDER SPECIALTY LIST ADULT
Electrophysiology
Endocrinology
Endocrinology
Hospitalist
Hospitalist
Internal Medicine
Internal Medicine
Neurology
Rehab Medicine
Endocrinology
Hospitalist
Neurology
Rehab Medicine
Rehab Medicine
Endocrinology
Endocrinology
Hospitalist
Hospitalist
Endocrinology
Hospitalist

## 2023-06-13 NOTE — PROGRESS NOTE ADULT - ASSESSMENT
Ms. Hendricks is a 63-year-old female with a past medical history of type 2 diabetes mellitus, hypertension, hyperlipidemia, Bell's Palsy (left-sided), CVA (2022 with left-sided residual deficits), coronary artery disease, atrial fibrillation and asthma who presented for evaluation of generalized weakness. She was found to have right pontine infarct. Endocrinology has been following for diabetes management.    A1C: 9.8 %  BUN: 33  Creatinine: 1.81  GFR: 31  Weight: 96.4  BMI: 41.5  EF: normal

## 2023-06-13 NOTE — PROGRESS NOTE ADULT - PROBLEM SELECTOR PLAN 6
Pt with BUN/Cr of 31/1.70 on admission. UA showing proteinuria and glucosuria.   -fluctuating creatinine - 1.79 today.  Likely new baseline is 1.6 - 1.8.  Was seen by nephrology, and no significant changes to management.  Can consider SGLT-2 inhibitor as outpatient  - renal ultrasound reassuringly normal  - continue with lisinopril and other medications as listed above

## 2023-06-13 NOTE — CHART NOTE - NSCHARTNOTEFT_GEN_A_CORE
Hematology Chart Note    Remainder of APLS testing is negative.   - Lupus anticoagulant and Hexagonal phase negative  - Anticardiolipin ab screen positive, but titers: IgG negative, IgM weak positive 27 (does not meet S[place]oro criteria), and IgA negative  - Beta 2 glycoprotein ab screen positive, but titers negative    Patient should have repeat testing in future off given weakly positive Anticardiolipin IgM. No anticoagulation from an APLS perspective. Defer to primary stroke team regarding all other management    Hematology will sign off. Please reconsult with questions. Discussed with hematology attending Dr. Bell.

## 2023-06-13 NOTE — PROGRESS NOTE ADULT - ASSESSMENT
63F with PMH of B[place] Palsy, DM2, CVA (left side weak), CAD, HLD, HTN, Asthma noted to have acute R pontine stroke.  On stroke service with medical comanagment

## 2023-06-13 NOTE — PROGRESS NOTE ADULT - ASSESSMENT
Neurology    63 y o F with pmhx of Bennet Palsy (left side), T2DM, CVA 2022 (left-sided deficits), CAD, HLD, HTN, Asthma, current smoker with a 52 pack year hx, ?AFib reportedly compliant on Eliquis who presented for evaluation of generalized weakness. Weakness started 5/21, got progressively worse 2-3 days ago. Daughter also noticed patient had been falling with occasional episodes of urinary incontinence. CTH with small vessel disease, CT C-spine negative for acute fx. Admitted to medicine for further w/u. Gen neuro consulted, recommended MRI brain w/o. MRI brain: small focus of restricted diffusion in the R petra, left pontomedullary junction and right frontal periventricular white matter. Patient transferred to stroke tele for further management. Eliquis discontinued and started asa/plavix on 6/6 as no indicated for AC. IWONA this admission w/o thrombus. EP interrogated ILR - no arrythmias. Nephro consulted for worsening CKD, renal sono neg. Recommend can continue with current antihypertensive therapy and optimize blood glucose with endocrinology. Can consider initiating SGLT-2 Inhibitor outpatient i/s/o CKD w/ proteinuria. Endo following.        Neuro  #CVA workup  - d/c eliquis and started asa/plavix on 6/6. collateral obtained from outpt cards office: patient had a cryptogenic right thalamic stroke in 9/2019 and underwent loop recorder implant on 1/31/20 for said stroke. LR was checked in 8/2020 with no evidence of AF. 3/2021 patient admitted to [facility] for acute left basal ganglia and bilateral frontal lobe stroke. Loop recorder again showed no evidence of afib. Because stroke was suspicious for cardioembolic phenomenon the patient was started on apixaban. IWONA this admission w/o thrombus.  - continue atorvastatin 80mg daily  - q4hr stroke neuro checks and vitals  - MRI brain: small focus of restricted diffusion in the R petra, left pontomedullary junction and right frontal periventricular white matter  - Stroke Code HCT Results: small vessel disease  - MRA head without steno-occlusive disease, MRA neck without stenosis or occlusion  - B12: 447  - folate: 11.2  - kappa/lamda free light chain ratio: 1.66  - Stroke education  - EP interrogated ILR - no arrythmias  - f/u hypercoags  - PET: There is a 1 cm lesion in the left parotid gland with intense FDG uptake.  It should be noted that many benign tumors of the parotid gland can have intense FDG uptake.  - ENT consulted, no acute intervention plan to follow up outpatient for biopsy    #Cognitive impairment  -8/30 on MOCA done at bedside  - Start donepezil 5mg at bedtime    #urinary incontinence, ? hyperreflexia r/o cord compression   - CT C-spine: Evaluation of the parotids glands demonstrates a approximately 11 mm well-demarcated lesion within the superficial lobe of the left parotid gland (series 5 image 25). The lesion demonstrates hyperintense signal on the T2-weighted images from the cervical MRI (series 12 image 3) and is incompletely evaluated. Precise histologic diagnosis will require tissue sampling. Primary considerations include enlarged parotid lymph node as well as primary parotid neoplasm such as benign mixed tumor or Warthin's tumor.  - PET: There is a 1 cm lesion in the left parotid gland with intense FDG uptake.  It should be noted that many benign tumors of the parotid gland can have intense FDG uptake.  - MRI C/T/L spine without cord compression    Cards  #HTN  - goal sBP 120-160, gradual normotension  - carvedilol increased to 6.25q12h on 6/9 due to BP above parameters, c/w Amlodipine 10mg. increased lisinopril from 20 to 40mg of 6/4  - can increase carvedilol if pressures continuously above 160. Will increase if BP remains elevated today  - Echocardiogram w/ Bubble and Doppler (06.06.23): Injection of agitated saline via a peripheral vein reveals no evidence of a right-to-left shunt.  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 Minimal interatrial right to left shunting noted predominantly with Valsalva suggestive of a tiny patent foramen ovale. There is severe non-mobile plaque seen in the visualized portion of the descending aorta. There is severe non-mobile plaque seen in the visualized portion of the aortic arch.  - Stroke Code EKG Results: NSR with L axis deviation and occasional Q waves but no active ischemic changes    #HLD  - high dose statin as above in CVA  - LDL results: 162    Pulm  - call provider if SPO2 < 94%    #asthma  - albuterol PRN    GI  #Nutrition/Fluids/Electrolytes   - replete K<4 and Mg <2  - Diet: Soft and bite sized  - MBS passed: soft and bite sized CC diet     Renal  #CKD   - Cr 1.7 on admission   - Will continue to trend  - collateral from PCP's office obtained:  5/2023 - Cr 1.57   9/2022 - Cr 1.38  6/2022 - Cr 1.34  - Nephro consulted for worsening CKD, renal sono neg. Recommend can continue with current antihypertensive therapy and optimize blood glucose with endocrinology. Can consider initiating SGLT-2 Inhibitor outpatient i/s/o CKD w/ proteinuria.    Infectious Disease  - Stroke Code CXR results: negative    Endocrine  #DM  - A1C results: 9.8  - endo following  Home regimen: 75/25 insulin 20 units QD, Metformin 1000mg BID and Rybelsus 3mg QD. She will likely not need insulin at discharge.  - Please continue lantus 5 units at bedtime.   - Continue lispro to 7 units before each meal.  - Continue lispro moderate dose sliding scale four times daily with meals and at bedtime.  - Patient's fingerstick glucose goal is 100-180 mg/dL.    - For discharge, patient can continue metformin 1000mg BID and rybelsus 3mg daily. Stop insulin. Freestyle Ted sent to [facility] and covered. Delivered to bedside. Reviewed how to apply with daughter and patient. Did not actually apply because it would need to removed for planned PET. Will apply right before discharge.  - Patient to follow up with physician's in [place], has new endocrine appointment.    ·  Problem: Thyroid nodule.   ·  Plan: There is 2.2 x 1.9 x 1.6 cm heterogeneous predominantly isoechoic nodule in the lower pole of right lobe.  Additional multiple spongiform benign-appearing thyroid nodules in left lobe  Repeat US in 6 months to determine if FNA is needed due to size >2cm. Discussed with daughter.  TFTs normal.    Heme  - f/u hypercoag panel may not be meaningful in the setting of acute stroke with Eliquis use outpatient and DVT prophylaxis inpatient, plan to follow up titers and continue with current management for now  - + Beta 2 Glycoprotein antibody screen, f/u titers  - + Anticardiolipin antibody screen, f/u titers  - Protein S low (25)  - Protein C resistance ratio low  - Hematology consulted f/u recommendations    DVT Prophylaxis  - heparin + SCDs  - dopplers: neg

## 2023-06-13 NOTE — PROGRESS NOTE ADULT - SUBJECTIVE AND OBJECTIVE BOX
Physical Medicine and Rehabilitation Progress Note :       Patient is a 63y old  Female who presents with a chief complaint of Weakness (13 Jun 2023 09:41)      HPI:  Ms. Hendricks is a 64 y/o F with a PMHx of [place] Palsy (on the left side), TIIDM, CVA (left side weak), CAD, HLD, HTN, Asthma, current smoker with a 52 pack year hx, brought for evaluation of worsening generalized weakness since 5/21. Pt states she has had a CVA in the past with residual L sided weakness in her leg and arm, however, pt began to notice continual weakness in both her upper and lower extremities. Pt states she was taken to an ER in [place] for pain in her L ankle and was d/c after no DVT was discovered. Since then, pt states she lost her balance upon getting out of bed 2x and presented to ER at [facility] 6/2 for further evaluation. Pt states she has had increasingly difficulty in using her wrists and ankles. Pt denies diarrheal illness previously, denies SOB and cough, palpitations and CP, headaches, fevers, chills, nausea, vomiting, diarrhea, constipation, dysuria, hematuria, hematochezia. Pt noted to have had some urinary hesitancy. Daughter Rosemarie at bedside [phone], states her mother has poor compliance with her medications, states she has mostly sedentary lifestyle.     ED Course:  ED Vitals: Initial: T 97.8, HR 70, /82, satting 99% on RA   Notable labs: WBC 7.98, Hgb/Hct 12.7/37.2, platelets 242; Na 137, K 4.7, Cl 105, CO2 24, BUN/Cr 31/1.70, Glucose 360, troponin 0.01   UA: Protein, small blood, RBC, bacteria, + Glucose   CXR: No evidence of acute cardiopulmonary disease  CTH: No acute intracranial hemorrhage, mass effect, or demarcated recent infarction, small vessel ischemic change throughout cerebral white matter and deep gray/white matter lacunae.  CT Cervical Spine: No fracture   EKG: NSR with L axis deviation and occasional Q waves but no active ischemic changes   Pt was admitted to [facility] for further workup of generalized weakness (02 Jun 2023 17:54)                            12.3   7.31  )-----------( 237      ( 13 Jun 2023 12:00 )             37.2       06-13    138  |  106  |  27<H>  ----------------------------<  130<H>  4.6   |  25  |  1.79<H>    Ca    9.2      13 Jun 2023 12:00      Vital Signs Last 24 Hrs  T(C): 36.7 (13 Jun 2023 09:01), Max: 36.7 (12 Jun 2023 22:10)  T(F): 98 (13 Jun 2023 09:01), Max: 98 (12 Jun 2023 22:10)  HR: 54 (13 Jun 2023 08:50) (54 - 66)  BP: 152/72 (13 Jun 2023 08:50) (142/66 - 173/78)  BP(mean): 102 (13 Jun 2023 08:50) (93 - 112)  RR: 18 (13 Jun 2023 08:50) (16 - 19)  SpO2: 98% (13 Jun 2023 08:50) (94% - 100%)    Parameters below as of 13 Jun 2023 08:50  Patient On (Oxygen Delivery Method): room air        MEDICATIONS  (STANDING):  amLODIPine   Tablet 10 milliGRAM(s) Oral daily  aspirin enteric coated 81 milliGRAM(s) Oral daily  atorvastatin 80 milliGRAM(s) Oral at bedtime  carvedilol 12.5 milliGRAM(s) Oral every 12 hours  clopidogrel Tablet 75 milliGRAM(s) Oral daily  dextrose 5%. 1000 milliLiter(s) (50 mL/Hr) IV Continuous <Continuous>  dextrose 5%. 1000 milliLiter(s) (100 mL/Hr) IV Continuous <Continuous>  dextrose 50% Injectable 25 Gram(s) IV Push once  dextrose 50% Injectable 25 Gram(s) IV Push once  dextrose 50% Injectable 12.5 Gram(s) IV Push once  donepezil 5 milliGRAM(s) Oral at bedtime  glucagon  Injectable 1 milliGRAM(s) IntraMuscular once  heparin   Injectable 7500 Unit(s) SubCutaneous every 8 hours  insulin lispro (ADMELOG) corrective regimen sliding scale   SubCutaneous Before meals and at bedtime  insulin lispro Injectable (ADMELOG) 5 Unit(s) SubCutaneous three times a day before meals  lidocaine   4% Patch 1 Patch Transdermal every 24 hours  lidocaine   4% Patch 1 Patch Transdermal every 24 hours  lisinopril 40 milliGRAM(s) Oral daily  polyethylene glycol 3350 17 Gram(s) Oral daily  senna 2 Tablet(s) Oral at bedtime    MEDICATIONS  (PRN):  acetaminophen     Tablet .. 650 milliGRAM(s) Oral every 6 hours PRN Temp greater or equal to 38C (100.4F), Moderate Pain (4 - 6)  dextrose Oral Gel 15 Gram(s) Oral once PRN Blood Glucose LESS THAN 70 milliGRAM(s)/deciliter         6/12/2023 Functional Status Assessment :       Pain Assessment/Number Scale (0-10) Adult  Presence of Pain: denies pain/discomfort (Rating = 0)  Pain Rating (0-10): Rest: 0 (no pain/absence of nonverbal indicators of pain)  Pain Rating (0-10): Activity: 0 (no pain/absence of nonverbal indicators of pain)    Safety      AM-PAC Functional Assessment: Basic Mobility  Type of Assessment: Daily assessment  Turning from your back to your side while in a flat bed without using bedrails?: 4 = No assist / stand by assistance  Moving from lying on your back to sitting on the flat side of a flat bed without using bedrails?: 4 = No assist / stand by assistance  Moving to and from a bed to a chair (including a wheelchair)?: 4 = No assist / stand by assistance  Standing up from a chair using your arms (e.g. wheelchair or bedside chair)?: 4 = No assist / stand by assistance  Walking in hospital room?: 4 = No assist / stand by assistance  Climbing 3-5 steps with a railing?: 4 = No assist / stand by assistance  Score: 24   Row Comment: Ask the patient "How much help from another person do you currently need? (If the patient hasn't done an activity recently, how much help from another person do you think he/she needs if he/she tried?)    Cognitive/Neuro      Cognitive/Neuro/Behavioral  Cognitive/Neuro/Behavioral [WDL Definition: Alert; opens eyes spontaneously; arouses to voice or touch; oriented x 4; follows commands; speech spontaneous, logical; purposeful motor response; behavior appropriate to situation]: WDL    Language Assistance  Preferred Language to Address Healthcare Preferred Language to Address Healthcare: English    Therapeutic Interventions      Bed Mobility  Bed Mobility Training Supine-to-Sit: independent    Sit-Stand Transfer Training  Transfer Training Sit-to-Stand Transfer: independent;  full weight-bearing  Transfer Training Stand-to-Sit Transfer: independent;  full weight-bearing  Sit-to-Stand Transfer Training Transfer Safety Analysis: impaired balance    Gait Training  Gait Training: independent;  full weight-bearing   rolling walker;  100 feet  Gait Analysis: 2-point gait   impaired balance;  100 feet;  rolling walker  Gait Number of Times:: x 2    Stair Training  Physical Assist/Nonphysical Assist: ind  Weight-Bearing Restrictions: full weight-bearing  Assistive Device: right rail up;  left rail down  Number of Stairs: 11       Pain Assessment/Number Scale (0-10) Adult  Presence of Pain: denies pain/discomfort (Rating = 0)  Pain Rating (0-10): Rest: 0 (no pain/absence of nonverbal indicators of pain)  Pain Rating (0-10): Activity: 0 (no pain/absence of nonverbal indicators of pain)  Comfort/Acceptable Pain Level (0-10): 0     Safety      AM-PAC Functional Assessment: Daily Activity  Type of Assessment: Daily assessment  Putting on and taking off regular lower body clothing?: 3 = A little assistance  Bathing (including washing, rinsing, drying)?: 2 = A lot of assistance  Toileting, which includes using toilet, bedpan or urinal?: 3 = A little assistance  Putting on and taking off regular upper body clothing?: 3 = A little assistance  Take care of personal grooming such as brushing teeth?: 3 = A little assistance  Eating meals?: 4 = No assist / stand by assistance  Score: 18   Row Comment: Ask the patient "How much help from another person do you currently need? (If the patient hasn't done an activity recently, how much help from another person do you think he/she needs if he/she tried?)    Cognitive/Neuro      Cognitive/Neuro/Behavioral  Cognitive/Neuro/Behavioral [WDL Definition: Alert; opens eyes spontaneously; arouses to voice or touch; oriented x 4; follows commands; speech spontaneous, logical; purposeful motor response; behavior appropriate to situation]: WDL  Level of Consciousness: confused;  bouts of confusion  Arousal Level: arouses to voice  Orientation: place;  time  Speech: clear;  spontaneous  Mood/Behavior: calm;  cooperative    Language Assistance  Preferred Language to Address Healthcare Preferred Language to Address Healthcare: English    Therapeutic Interventions      Sit-Stand Transfer Training  Transfer Training Sit-to-Stand Transfer: minimum assist (75% patient effort);  1 person assist;  verbal cues;  HHA  Transfer Training Stand-to-Sit Transfer: contact guard;  1 person assist;  verbal cues;  HHA  Sit-to-Stand Transfer Training Transfer Safety Analysis: decreased balance;  decreased sequencing ability;  decreased strength;  impaired balance;  impaired coordination;  HHA    Therapeutic Exercise  Therapeutic Exercise Symptoms Noted During/After Treatment: fatigue  Therapeutic Exercise Charges: Pt ambulated in hallway ~40ftx2 w/ Min A<>CGA (HHA provided) - Ataxic in gait   Therapeutic Exercise Detail: BUE MMTs RUE 5/5; L shoulder 3-/5; elbow, wrist and digits 2+/5impaired L gross and fine grasp/object manipulation noted     Lower Body Dressing Training  Lower Body Dressing Training Assistance: moderate assist (50% patient effort);  1 person assist;  verbal cues;  to don/doff LB garment w/ comp tech;  decreased flexibility;  decreased strength;  impaired balance;  impaired coordination;  abnormal muscle tone            PM&R Impression : as above    Current Disposition Plan Recommendations :    d/c home, home PT/OT

## 2023-06-13 NOTE — PROGRESS NOTE ADULT - PROBLEM SELECTOR PLAN 1
MRI with short stroke protocol revealed acute ischemia in the right petra.  - eliquis discontinued - no DVT on dopplers and no cardiac thrombus  - continue on dual antiplatelet therapy    #coagulability abnormalities   -low protein S free activity assay and activated protein C resistance ratio as well as + anticardiolipin, [id number]  -heme following - appreciate recs  - added lupus anticoagulant awaiting results- as this would - would likely require lovenox with bridge to coumadin, but confirm with hematology    #left parotid mass -- abnormal uptake on PET - may be benign or malignant, outpatient ENT f/u with Dr. Ortega for FNA

## 2023-06-13 NOTE — PROGRESS NOTE ADULT - TIME BILLING
Review of chart information, interpretation of data, evaluation of patient, coordination of care.
case complexity
Insulin adjustment, evaluation of nodule

## 2023-06-13 NOTE — PROGRESS NOTE ADULT - SUBJECTIVE AND OBJECTIVE BOX
SUBJECTIVE / INTERVAL HPI: Patient was seen and examined this morning. Fasting glucose at target without Lantus.    CAPILLARY BLOOD GLUCOSE & INSULIN RECEIVED  177 mg/dL (06-12 @ 16:16) - Lispro 5+2  129 mg/dL (06-12 @ 21:13)  117 mg/dL (06-13 @ 06:17) - Lispro 5  117 mg/dL (06-13 @ 11:20) - Lispro 5    REVIEW OF SYSTEMS  Constitutional:  Negative fever, chills or loss of appetite.  Eyes:  Negative blurry vision or double vision.  Cardiovascular:  Negative for chest pain or palpitations.  Respiratory:  Negative for cough, wheezing, or shortness of breath.    Gastrointestinal:  Negative for nausea, vomiting, diarrhea, constipation, or abdominal pain.  Genitourinary:  Negative frequency, urgency or dysuria.  Neurologic:  No headache, confusion, dizziness, lightheadedness. + weakness    PHYSICAL EXAM  Vital Signs Last 24 Hrs  T(C): 36.7 (13 Jun 2023 09:01), Max: 37.2 (12 Jun 2023 13:31)  T(F): 98 (13 Jun 2023 09:01), Max: 99 (12 Jun 2023 13:31)  HR: 54 (13 Jun 2023 08:50) (54 - 66)  BP: 152/72 (13 Jun 2023 08:50) (142/66 - 173/78)  BP(mean): 102 (13 Jun 2023 08:50) (93 - 112)  RR: 18 (13 Jun 2023 08:50) (16 - 19)  SpO2: 98% (13 Jun 2023 08:50) (94% - 100%)    Parameters below as of 13 Jun 2023 08:50  Patient On (Oxygen Delivery Method): room air    Constitutional: Awake, alert, in no acute distress.   HEENT: Normocephalic, atraumatic, ANDREA.  Respiratory: Lungs clear to ausculation bilaterally.   Cardiovascular: regular rhythm, normal S1 and S2, no audible murmurs.   GI: soft, non-tender, non-distended, bowel sounds present.  Extremities: No lower extremity edema.  Psychiatric: AAO x 3. Normal affect/mood.     LABS  CBC - WBC/HGB/HTC/PLT: 6.68/11.6/35.0/223 (06-12-23)  BMP - Na/K/Cl/Bicarb/BUN/Cr/Gluc/AG/eGFR: 139/4.8/107/23/33/1.81/134/9/31 (06-12-23)  Ca - 8.8 (06-12-23)  Phos - -- (06-12-23)  Mg - -- (06-12-23)      Thyroid Stimulating Hormone, Serum: 1.670 (06-04-23)  Total T4/Free T4: --/1.150 (06-04-23)    MEDICATIONS  MEDICATIONS  (STANDING):  amLODIPine   Tablet 10 milliGRAM(s) Oral daily  aspirin enteric coated 81 milliGRAM(s) Oral daily  atorvastatin 80 milliGRAM(s) Oral at bedtime  carvedilol 12.5 milliGRAM(s) Oral every 12 hours  clopidogrel Tablet 75 milliGRAM(s) Oral daily  dextrose 5%. 1000 milliLiter(s) (50 mL/Hr) IV Continuous <Continuous>  dextrose 5%. 1000 milliLiter(s) (100 mL/Hr) IV Continuous <Continuous>  dextrose 50% Injectable 25 Gram(s) IV Push once  dextrose 50% Injectable 25 Gram(s) IV Push once  dextrose 50% Injectable 12.5 Gram(s) IV Push once  donepezil 5 milliGRAM(s) Oral at bedtime  glucagon  Injectable 1 milliGRAM(s) IntraMuscular once  heparin   Injectable 7500 Unit(s) SubCutaneous every 8 hours  insulin lispro (ADMELOG) corrective regimen sliding scale   SubCutaneous Before meals and at bedtime  insulin lispro Injectable (ADMELOG) 5 Unit(s) SubCutaneous three times a day before meals  lidocaine   4% Patch 1 Patch Transdermal every 24 hours  lidocaine   4% Patch 1 Patch Transdermal every 24 hours  lisinopril 40 milliGRAM(s) Oral daily  polyethylene glycol 3350 17 Gram(s) Oral daily  senna 2 Tablet(s) Oral at bedtime    MEDICATIONS  (PRN):  acetaminophen     Tablet .. 650 milliGRAM(s) Oral every 6 hours PRN Temp greater or equal to 38C (100.4F), Moderate Pain (4 - 6)  dextrose Oral Gel 15 Gram(s) Oral once PRN Blood Glucose LESS THAN 70 milliGRAM(s)/deciliter   SUBJECTIVE / INTERVAL HPI: Patient was seen and examined this morning. No new complaints. Pending discharge to home. Fasting glucose at target without Lantus. Freestyle Ted placed to left upper arm and sensor started with phone [place].    CAPILLARY BLOOD GLUCOSE & INSULIN RECEIVED  177 mg/dL (06-12 @ 16:16) - Lispro 5+2. Ate chicken, rice, fruit cocktail  129 mg/dL (06-12 @ 21:13)  117 mg/dL (06-13 @ 06:17) - Lispro 5. Ate eggs, fruit cocktail, jello, and coffee.  117 mg/dL (06-13 @ 11:20) - Lispro 5    REVIEW OF SYSTEMS  Constitutional:  Negative fever, chills or loss of appetite.  Eyes:  Negative blurry vision or double vision.  Cardiovascular:  Negative for chest pain or palpitations.  Respiratory:  Negative for cough, wheezing, or shortness of breath.    Gastrointestinal:  Negative for nausea, vomiting, diarrhea, constipation, or abdominal pain.  Genitourinary:  Negative frequency, urgency or dysuria.  Neurologic:  No headache, confusion, dizziness, lightheadedness. + weakness    PHYSICAL EXAM  Vital Signs Last 24 Hrs  T(C): 36.7 (13 Jun 2023 09:01), Max: 37.2 (12 Jun 2023 13:31)  T(F): 98 (13 Jun 2023 09:01), Max: 99 (12 Jun 2023 13:31)  HR: 54 (13 Jun 2023 08:50) (54 - 66)  BP: 152/72 (13 Jun 2023 08:50) (142/66 - 173/78)  BP(mean): 102 (13 Jun 2023 08:50) (93 - 112)  RR: 18 (13 Jun 2023 08:50) (16 - 19)  SpO2: 98% (13 Jun 2023 08:50) (94% - 100%)    Parameters below as of 13 Jun 2023 08:50  Patient On (Oxygen Delivery Method): room air    Constitutional: Awake, alert, in no acute distress.   HEENT: Normocephalic, atraumatic, ANDREA.  Respiratory: Lungs clear to ausculation bilaterally.   Cardiovascular: regular rhythm, normal S1 and S2, no audible murmurs.   GI: soft, non-tender, non-distended, bowel sounds present.  Extremities: No lower extremity edema.  Psychiatric: AAO x 3. Normal affect/mood.     LABS  CBC - WBC/HGB/HTC/PLT: 6.68/11.6/35.0/223 (06-12-23)  BMP - Na/K/Cl/Bicarb/BUN/Cr/Gluc/AG/eGFR: 139/4.8/107/23/33/1.81/134/9/31 (06-12-23)  Ca - 8.8 (06-12-23)  Phos - -- (06-12-23)  Mg - -- (06-12-23)      Thyroid Stimulating Hormone, Serum: 1.670 (06-04-23)  Total T4/Free T4: --/1.150 (06-04-23)    MEDICATIONS  MEDICATIONS  (STANDING):  amLODIPine   Tablet 10 milliGRAM(s) Oral daily  aspirin enteric coated 81 milliGRAM(s) Oral daily  atorvastatin 80 milliGRAM(s) Oral at bedtime  carvedilol 12.5 milliGRAM(s) Oral every 12 hours  clopidogrel Tablet 75 milliGRAM(s) Oral daily  dextrose 5%. 1000 milliLiter(s) (50 mL/Hr) IV Continuous <Continuous>  dextrose 5%. 1000 milliLiter(s) (100 mL/Hr) IV Continuous <Continuous>  dextrose 50% Injectable 25 Gram(s) IV Push once  dextrose 50% Injectable 25 Gram(s) IV Push once  dextrose 50% Injectable 12.5 Gram(s) IV Push once  donepezil 5 milliGRAM(s) Oral at bedtime  glucagon  Injectable 1 milliGRAM(s) IntraMuscular once  heparin   Injectable 7500 Unit(s) SubCutaneous every 8 hours  insulin lispro (ADMELOG) corrective regimen sliding scale   SubCutaneous Before meals and at bedtime  insulin lispro Injectable (ADMELOG) 5 Unit(s) SubCutaneous three times a day before meals  lidocaine   4% Patch 1 Patch Transdermal every 24 hours  lidocaine   4% Patch 1 Patch Transdermal every 24 hours  lisinopril 40 milliGRAM(s) Oral daily  polyethylene glycol 3350 17 Gram(s) Oral daily  senna 2 Tablet(s) Oral at bedtime    MEDICATIONS  (PRN):  acetaminophen     Tablet .. 650 milliGRAM(s) Oral every 6 hours PRN Temp greater or equal to 38C (100.4F), Moderate Pain (4 - 6)  dextrose Oral Gel 15 Gram(s) Oral once PRN Blood Glucose LESS THAN 70 milliGRAM(s)/deciliter

## 2023-06-13 NOTE — PROGRESS NOTE ADULT - NS ATTEND AMEND GEN_ALL_CORE FT
The patient is a 63-year-old female with a history of prior strokes in 2019, 2020, 2021 (felt to be cardioembolic w/o confirmed a fib; on Eliquis, compliant), CAD, hypertension, hyperlipidemia, type 2 diabetes, tobacco dependence admitted after presenting with progressive generalized weakness with MRI showing an acute right pontine, left pontomedullary junction, and right frontal periventricular ischemic infarcts.  MRA head/neck unremarkable for large vessel disease.  TTE, IWONA unrevealing apart from severe aortic plaque. PET + parotid lesion; will need OP eval with ENT. Hypercoag studies w + Anti-C/L and GP Ab screens- will need repeat testing in 12 weeks. No indication for a/c currently. Will need outpatient, age-appropriate cancer screening. For now, continue DAPT, statin. Will need to clarify if patient had strokes while on Plavix. If so, will need to transition to Brilinta 90 mg BID with ASA 81 X21 days and then Brilinta alone thereafter. DC home today w home services. Would rec OP neurocog eval given poor scoring on MOCA (?component due to language barrier). The patient is a 63-year-old female with a history of prior strokes in 2019, 2020, 2021 (felt to be cardioembolic w/o confirmed a fib; on Eliquis, compliant), CAD, hypertension, hyperlipidemia, type 2 diabetes, tobacco dependence admitted after presenting with progressive generalized weakness with MRI showing an acute right pontine, left pontomedullary junction, and right frontal periventricular ischemic infarcts.  MRA head/neck unremarkable for large vessel disease.  TTE, IWONA unrevealing apart from severe aortic plaque. PET + parotid lesion; will need OP eval with ENT as well as endo for thyroid nodule. Hypercoag studies w + Anti-C/L and GP Ab screens- will need repeat testing in 12 weeks. No indication for a/c currently. Will need outpatient, age-appropriate cancer screening. For now, continue DAPT, statin. Will need to clarify if patient had strokes while on Plavix. If so, will need to transition to Brilinta 90 mg BID with ASA 81 X21 days and then Brilinta alone thereafter. DC home today w home services. Would rec OP neurocog eval given poor scoring on MOCA (?component due to language barrier). Bilobed Flap Text: The defect edges were debeveled with a #15 scalpel blade.  Given the location of the defect and the proximity to free margins a bilobe flap was deemed most appropriate.  Using a sterile surgical marker, an appropriate bilobe flap drawn around the defect.    The area thus outlined was incised deep to adipose tissue with a #15 scalpel blade.  The skin margins were undermined to an appropriate distance in all directions utilizing iris scissors.

## 2023-06-13 NOTE — PROGRESS NOTE ADULT - SUBJECTIVE AND OBJECTIVE BOX
Feeling okay today - denies any new symptoms.     No overnight events reported.     Remaining ROS negative     PHYSICAL EXAM:    General: WDWN  HEENT: NC/AT; MMM, EOMI  Cardiovascular: +S1/S2, RRR, no mrg  Respiratory: CTA B/L; no W/R/R  Gastrointestinal: soft, NT/ND; +BSx4  Extremities: WWP; slight RLE>LLE edema 1+ pitting  Psychiatric: pleasant mood and affect  Dermatologic: no appreciable wounds or damage to the     VITAL SIGNS:  Vital Signs Last 24 Hrs  T(C): 36.9 (13 Jun 2023 13:37), Max: 36.9 (13 Jun 2023 13:37)  T(F): 98.4 (13 Jun 2023 13:37), Max: 98.4 (13 Jun 2023 13:37)  HR: 54 (13 Jun 2023 08:50) (54 - 66)  BP: 152/72 (13 Jun 2023 08:50) (142/66 - 173/78)  BP(mean): 102 (13 Jun 2023 08:50) (93 - 112)  RR: 18 (13 Jun 2023 08:50) (16 - 19)  SpO2: 98% (13 Jun 2023 08:50) (94% - 100%)    Parameters below as of 13 Jun 2023 08:50  Patient On (Oxygen Delivery Method): room air          MEDICATIONS:  MEDICATIONS  (STANDING):  amLODIPine   Tablet 10 milliGRAM(s) Oral daily  aspirin enteric coated 81 milliGRAM(s) Oral daily  atorvastatin 80 milliGRAM(s) Oral at bedtime  carvedilol 12.5 milliGRAM(s) Oral every 12 hours  clopidogrel Tablet 75 milliGRAM(s) Oral daily  dextrose 5%. 1000 milliLiter(s) (100 mL/Hr) IV Continuous <Continuous>  dextrose 5%. 1000 milliLiter(s) (50 mL/Hr) IV Continuous <Continuous>  dextrose 50% Injectable 25 Gram(s) IV Push once  dextrose 50% Injectable 25 Gram(s) IV Push once  dextrose 50% Injectable 12.5 Gram(s) IV Push once  donepezil 5 milliGRAM(s) Oral at bedtime  furosemide    Tablet 20 milliGRAM(s) Oral daily  glucagon  Injectable 1 milliGRAM(s) IntraMuscular once  heparin   Injectable 7500 Unit(s) SubCutaneous every 8 hours  insulin lispro (ADMELOG) corrective regimen sliding scale   SubCutaneous Before meals and at bedtime  insulin lispro Injectable (ADMELOG) 5 Unit(s) SubCutaneous three times a day before meals  lidocaine   4% Patch 1 Patch Transdermal every 24 hours  lidocaine   4% Patch 1 Patch Transdermal every 24 hours  lisinopril 40 milliGRAM(s) Oral daily  polyethylene glycol 3350 17 Gram(s) Oral daily  senna 2 Tablet(s) Oral at bedtime    MEDICATIONS  (PRN):  acetaminophen     Tablet .. 650 milliGRAM(s) Oral every 6 hours PRN Temp greater or equal to 38C (100.4F), Moderate Pain (4 - 6)  dextrose Oral Gel 15 Gram(s) Oral once PRN Blood Glucose LESS THAN 70 milliGRAM(s)/deciliter      ALLERGIES:  Allergies    codeine (Unknown)    Intolerances        LABS:                        12.3   7.31  )-----------( 237      ( 13 Jun 2023 12:00 )             37.2     06-13    138  |  106  |  27<H>  ----------------------------<  130<H>  4.6   |  25  |  1.79<H>    Ca    9.2      13 Jun 2023 12:00          CAPILLARY BLOOD GLUCOSE      POCT Blood Glucose.: 117 mg/dL (13 Jun 2023 11:20)      RADIOLOGY & ADDITIONAL TESTS: Reviewed.

## 2023-06-13 NOTE — PROGRESS NOTE ADULT - SUBJECTIVE AND OBJECTIVE BOX
Neurology Stroke Progress Note    INTERVAL HPI/OVERNIGHT EVENTS:  No events overnight. Patient eager to have discussion re: discharge plan    MEDICATIONS  (STANDING):  amLODIPine   Tablet 10 milliGRAM(s) Oral daily  aspirin enteric coated 81 milliGRAM(s) Oral daily  atorvastatin 80 milliGRAM(s) Oral at bedtime  carvedilol 12.5 milliGRAM(s) Oral every 12 hours  clopidogrel Tablet 75 milliGRAM(s) Oral daily  dextrose 5%. 1000 milliLiter(s) (50 mL/Hr) IV Continuous <Continuous>  dextrose 5%. 1000 milliLiter(s) (100 mL/Hr) IV Continuous <Continuous>  dextrose 50% Injectable 25 Gram(s) IV Push once  dextrose 50% Injectable 25 Gram(s) IV Push once  dextrose 50% Injectable 12.5 Gram(s) IV Push once  donepezil 5 milliGRAM(s) Oral at bedtime  glucagon  Injectable 1 milliGRAM(s) IntraMuscular once  heparin   Injectable 7500 Unit(s) SubCutaneous every 8 hours  insulin lispro (ADMELOG) corrective regimen sliding scale   SubCutaneous Before meals and at bedtime  insulin lispro Injectable (ADMELOG) 5 Unit(s) SubCutaneous three times a day before meals  lidocaine   4% Patch 1 Patch Transdermal every 24 hours  lidocaine   4% Patch 1 Patch Transdermal every 24 hours  lisinopril 40 milliGRAM(s) Oral daily  polyethylene glycol 3350 17 Gram(s) Oral daily  senna 2 Tablet(s) Oral at bedtime    MEDICATIONS  (PRN):  acetaminophen     Tablet .. 650 milliGRAM(s) Oral every 6 hours PRN Temp greater or equal to 38C (100.4F), Moderate Pain (4 - 6)  dextrose Oral Gel 15 Gram(s) Oral once PRN Blood Glucose LESS THAN 70 milliGRAM(s)/deciliter    Allergies    codeine (Unknown)    Intolerances      Vital Signs Last 24 Hrs  T(C): 36.7 (13 Jun 2023 09:01), Max: 37.2 (12 Jun 2023 13:31)  T(F): 98 (13 Jun 2023 09:01), Max: 99 (12 Jun 2023 13:31)  HR: 54 (13 Jun 2023 08:50) (54 - 66)  BP: 152/72 (13 Jun 2023 08:50) (142/66 - 173/78)  BP(mean): 102 (13 Jun 2023 08:50) (93 - 112)  RR: 18 (13 Jun 2023 08:50) (16 - 19)  SpO2: 98% (13 Jun 2023 08:50) (94% - 100%)    Parameters below as of 13 Jun 2023 08:50  Patient On (Oxygen Delivery Method): room air        Physical exam:  General: No acute distress, awake and alert  Eyes: Anicteric sclerae, moist conjunctivae, see below for CNs  Neck: trachea midline, FROM, supple, no thyromegaly or lymphadenopathy  Cardiovascular: Regular rate and rhythm, no murmurs, rubs, or gallops. No carotid bruits.   Pulmonary: Anterior breath sounds clear bilaterally, no crackles or wheezing. No use of accessory muscles  GI: Abdomen soft, non-distended, non-tender  Extremities: Radial and DP pulses +2, no edema    Neurologic:  -Mental status: Awake, alert, oriented to person, place, and time. Speech is fluent with intact naming, repetition, and comprehension, no dysarthria. Recent and remote memory intact. Follows commands. Attention/concentration intact. Fund of knowledge appropriate.  -Cranial nerves:   II: Visual fields are full to confrontation.  III, IV, VI: Extraocular movements are intact without nystagmus. Pupils equally round and reactive to light  V:  Facial sensation V1-V3 equal and intact   VII: Face is symmetric with normal eye closure and smile  VIII: Hearing is bilaterally intact to finger rub  IX, X: Uvula is midline and soft palate rises symmetrically  XI: Head turning and shoulder shrug are intact.  XII: Tongue protrudes midline  Motor: Normal bulk and tone. No pronator drift. Strength bilateral upper extremity 5/5, bilateral lower extremities 5/5.  Rapid alternating movements intact and symmetric  Sensation: Intact to light touch bilaterally. No neglect or extinction on double simultaneous testing.  Coordination: No dysmetria on finger-to-nose and heel-to-shin bilaterally  Reflexes: Downgoing toes bilaterally   Gait: Narrow gait and steady    LABS:                        11.6   6.68  )-----------( 223      ( 12 Jun 2023 12:30 )             35.0     06-12    139  |  107  |  33<H>  ----------------------------<  134<H>  4.8   |  23  |  1.81<H>    Ca    8.8      12 Jun 2023 12:30            RADIOLOGY & ADDITIONAL TESTS:     Neurology Stroke Progress Note    INTERVAL HPI/OVERNIGHT EVENTS:  No events overnight. Patient eager to have discussion re: discharge plan    MEDICATIONS  (STANDING):  amLODIPine   Tablet 10 milliGRAM(s) Oral daily  aspirin enteric coated 81 milliGRAM(s) Oral daily  atorvastatin 80 milliGRAM(s) Oral at bedtime  carvedilol 12.5 milliGRAM(s) Oral every 12 hours  clopidogrel Tablet 75 milliGRAM(s) Oral daily  dextrose 5%. 1000 milliLiter(s) (50 mL/Hr) IV Continuous <Continuous>  dextrose 5%. 1000 milliLiter(s) (100 mL/Hr) IV Continuous <Continuous>  dextrose 50% Injectable 25 Gram(s) IV Push once  dextrose 50% Injectable 25 Gram(s) IV Push once  dextrose 50% Injectable 12.5 Gram(s) IV Push once  donepezil 5 milliGRAM(s) Oral at bedtime  glucagon  Injectable 1 milliGRAM(s) IntraMuscular once  heparin   Injectable 7500 Unit(s) SubCutaneous every 8 hours  insulin lispro (ADMELOG) corrective regimen sliding scale   SubCutaneous Before meals and at bedtime  insulin lispro Injectable (ADMELOG) 5 Unit(s) SubCutaneous three times a day before meals  lidocaine   4% Patch 1 Patch Transdermal every 24 hours  lidocaine   4% Patch 1 Patch Transdermal every 24 hours  lisinopril 40 milliGRAM(s) Oral daily  polyethylene glycol 3350 17 Gram(s) Oral daily  senna 2 Tablet(s) Oral at bedtime    MEDICATIONS  (PRN):  acetaminophen     Tablet .. 650 milliGRAM(s) Oral every 6 hours PRN Temp greater or equal to 38C (100.4F), Moderate Pain (4 - 6)  dextrose Oral Gel 15 Gram(s) Oral once PRN Blood Glucose LESS THAN 70 milliGRAM(s)/deciliter    Allergies    codeine (Unknown)    Intolerances      Vital Signs Last 24 Hrs  T(C): 36.7 (13 Jun 2023 09:01), Max: 37.2 (12 Jun 2023 13:31)  T(F): 98 (13 Jun 2023 09:01), Max: 99 (12 Jun 2023 13:31)  HR: 54 (13 Jun 2023 08:50) (54 - 66)  BP: 152/72 (13 Jun 2023 08:50) (142/66 - 173/78)  BP(mean): 102 (13 Jun 2023 08:50) (93 - 112)  RR: 18 (13 Jun 2023 08:50) (16 - 19)  SpO2: 98% (13 Jun 2023 08:50) (94% - 100%)    Parameters below as of 13 Jun 2023 08:50  Patient On (Oxygen Delivery Method): room air        Physical exam:  General: No acute distress, awake and alert. Oral thrush on tongue  Eyes: Anicteric sclerae, moist conjunctivae, see below for CNs  Neck: trachea midline, FROM, supple, no thyromegaly or lymphadenopathy  Cardiovascular: Regular rate and rhythm, no murmurs, rubs, or gallops. No carotid bruits.   Pulmonary: Anterior breath sounds clear bilaterally, no crackles or wheezing. No use of accessory muscles  GI: Abdomen soft, non-distended, non-tender  Extremities: Radial and DP pulses +2, no edema    Neurologic:  -Mental status: Awake, alert, oriented to person, place, but not time. Speech is slowed with intact naming, repetition, and comprehension, mild dysarthria. Recent and remote memory inmapired. Follows commands. Attention/concentration impaired. Fund of knowledge appropriate.  -Cranial nerves:   II: Visual fields are full to confrontation.  III, IV, VI: Extraocular movements are intact without nystagmus. Pupils equally round and reactive to light  V:  Facial sensation V1-V3 diminished on left post bell's palsy  VII: Face is asymmetric with droop on left  VIII: Hearing is bilaterally intact to finger rub with hyperacusis on left  IX, X: Uvula is midline and soft palate rises symmetrically  XI: Head turning and shoulder shrug are intact.  XII: Tongue protrudes midline  Motor: Normal bulk and tone. Slight finger curl on right and drift on left on pronation testing. Strength right upper 5/5 an d left upper extremity 4+/5, bilateral lower extremities 5/5.  Rapid alternating movements intact and symmetric  Sensation: Intact to light touch bilaterally. No neglect or extinction on double simultaneous testing. Diminished sensation distal lower extremities in stocking glove pattern.,  Coordination: No dysmetria on finger-to-nose and heel-to-shin bilaterally  Reflexes: Downgoing toes bilaterally   Gait: Slightly wide-based  MOCA: 10/30 assessed on June 12th. Highest level of education achieved 11th grade.    LABS:                        11.6   6.68  )-----------( 223      ( 12 Jun 2023 12:30 )             35.0     06-12    139  |  107  |  33<H>  ----------------------------<  134<H>  4.8   |  23  |  1.81<H>    Ca    8.8      12 Jun 2023 12:30            RADIOLOGY & ADDITIONAL TESTS:

## 2023-06-13 NOTE — PROGRESS NOTE ADULT - PROBLEM SELECTOR PLAN 3
lisinopril 40mg daily and amlodipine 10  -coreg now increased to 12.5 bid for improved BP control  added 20mg daily of lasix, due to CKD and eGFR hovering around 30.  Selected lasix as opposed to HCTZ, as HCTZ less effective when GFR drops below 30.  Will need to have outpatient BMP drawn in 2-3 days, and needs to follow up with PCP, nephrology and endocrinology.    okay for HR parameter > 55 for coreg

## 2023-06-15 ENCOUNTER — APPOINTMENT (OUTPATIENT)
Dept: INTERNAL MEDICINE | Facility: CLINIC | Age: 63
End: 2023-06-15

## 2023-06-15 PROBLEM — Z00.00 ENCOUNTER FOR PREVENTIVE HEALTH EXAMINATION: Status: ACTIVE | Noted: 2023-06-15

## 2023-06-16 DIAGNOSIS — R79.89 OTHER SPECIFIED ABNORMAL FINDINGS OF BLOOD CHEMISTRY: ICD-10-CM

## 2023-06-16 DIAGNOSIS — E66.01 MORBID (SEVERE) OBESITY DUE TO EXCESS CALORIES: ICD-10-CM

## 2023-06-16 DIAGNOSIS — Z79.899 OTHER LONG TERM (CURRENT) DRUG THERAPY: ICD-10-CM

## 2023-06-16 DIAGNOSIS — N18.9 CHRONIC KIDNEY DISEASE, UNSPECIFIED: ICD-10-CM

## 2023-06-16 DIAGNOSIS — I25.10 ATHEROSCLEROTIC HEART DISEASE OF NATIVE CORONARY ARTERY WITHOUT ANGINA PECTORIS: ICD-10-CM

## 2023-06-16 DIAGNOSIS — I63.9 CEREBRAL INFARCTION, UNSPECIFIED: ICD-10-CM

## 2023-06-16 DIAGNOSIS — I12.9 HYPERTENSIVE CHRONIC KIDNEY DISEASE WITH STAGE 1 THROUGH STAGE 4 CHRONIC KIDNEY DISEASE, OR UNSPECIFIED CHRONIC KIDNEY DISEASE: ICD-10-CM

## 2023-06-16 DIAGNOSIS — Z90.49 ACQUIRED ABSENCE OF OTHER SPECIFIED PARTS OF DIGESTIVE TRACT: ICD-10-CM

## 2023-06-16 DIAGNOSIS — J45.909 UNSPECIFIED ASTHMA, UNCOMPLICATED: ICD-10-CM

## 2023-06-16 DIAGNOSIS — R32 UNSPECIFIED URINARY INCONTINENCE: ICD-10-CM

## 2023-06-16 DIAGNOSIS — R47.1 DYSARTHRIA AND ANARTHRIA: ICD-10-CM

## 2023-06-16 DIAGNOSIS — I69.354 HEMIPLEGIA AND HEMIPARESIS FOLLOWING CEREBRAL INFARCTION AFFECTING LEFT NON-DOMINANT SIDE: ICD-10-CM

## 2023-06-16 DIAGNOSIS — E11.22 TYPE 2 DIABETES MELLITUS WITH DIABETIC CHRONIC KIDNEY DISEASE: ICD-10-CM

## 2023-06-16 DIAGNOSIS — R27.0 ATAXIA, UNSPECIFIED: ICD-10-CM

## 2023-06-16 DIAGNOSIS — Z88.5 ALLERGY STATUS TO NARCOTIC AGENT: ICD-10-CM

## 2023-06-16 DIAGNOSIS — Z91.148 PATIENT'S OTHER NONCOMPLIANCE WITH MEDICATION REGIMEN FOR OTHER REASON: ICD-10-CM

## 2023-06-16 DIAGNOSIS — E78.5 HYPERLIPIDEMIA, UNSPECIFIED: ICD-10-CM

## 2023-06-16 DIAGNOSIS — E11.65 TYPE 2 DIABETES MELLITUS WITH HYPERGLYCEMIA: ICD-10-CM

## 2023-06-16 DIAGNOSIS — Z95.828 PRESENCE OF OTHER VASCULAR IMPLANTS AND GRAFTS: ICD-10-CM

## 2023-06-16 DIAGNOSIS — Z79.84 LONG TERM (CURRENT) USE OF ORAL HYPOGLYCEMIC DRUGS: ICD-10-CM

## 2023-06-16 DIAGNOSIS — F17.210 NICOTINE DEPENDENCE, CIGARETTES, UNCOMPLICATED: ICD-10-CM

## 2023-06-16 DIAGNOSIS — F09 UNSPECIFIED MENTAL DISORDER DUE TO KNOWN PHYSIOLOGICAL CONDITION: ICD-10-CM

## 2023-06-16 DIAGNOSIS — G51.0 BELL'S PALSY: ICD-10-CM

## 2023-06-16 DIAGNOSIS — R53.1 WEAKNESS: ICD-10-CM

## 2023-06-16 DIAGNOSIS — E04.2 NONTOXIC MULTINODULAR GOITER: ICD-10-CM

## 2023-06-16 DIAGNOSIS — Z79.4 LONG TERM (CURRENT) USE OF INSULIN: ICD-10-CM

## 2023-06-16 DIAGNOSIS — K11.9 DISEASE OF SALIVARY GLAND, UNSPECIFIED: ICD-10-CM

## 2023-06-16 DIAGNOSIS — R29.706 NIHSS SCORE 6: ICD-10-CM

## 2023-06-16 DIAGNOSIS — E11.40 TYPE 2 DIABETES MELLITUS WITH DIABETIC NEUROPATHY, UNSPECIFIED: ICD-10-CM

## 2023-06-23 ENCOUNTER — APPOINTMENT (OUTPATIENT)
Dept: INTERNAL MEDICINE | Facility: CLINIC | Age: 63
End: 2023-06-23
Payer: MEDICARE

## 2023-06-23 VITALS
DIASTOLIC BLOOD PRESSURE: 79 MMHG | WEIGHT: 213 LBS | OXYGEN SATURATION: 100 % | TEMPERATURE: 97.1 F | BODY MASS INDEX: 39.2 KG/M2 | HEART RATE: 63 BPM | SYSTOLIC BLOOD PRESSURE: 138 MMHG | HEIGHT: 62 IN

## 2023-06-23 DIAGNOSIS — K11.8 OTHER DISEASES OF SALIVARY GLANDS: ICD-10-CM

## 2023-06-23 DIAGNOSIS — N18.9 CHRONIC KIDNEY DISEASE, UNSPECIFIED: ICD-10-CM

## 2023-06-23 PROCEDURE — 99495 TRANSJ CARE MGMT MOD F2F 14D: CPT

## 2023-06-23 RX ORDER — ATORVASTATIN CALCIUM 80 MG/1
80 TABLET, FILM COATED ORAL DAILY
Qty: 30 | Refills: 0 | Status: ACTIVE | COMMUNITY
Start: 2023-06-23

## 2023-06-23 RX ORDER — ASPIRIN ENTERIC COATED TABLETS 81 MG 81 MG/1
81 TABLET, DELAYED RELEASE ORAL DAILY
Refills: 0 | Status: ACTIVE | COMMUNITY
Start: 2023-06-23

## 2023-10-19 DIAGNOSIS — F03.90 UNSPECIFIED DEMENTIA W/OUT BEHAVIORAL DISTURBANCE: ICD-10-CM

## 2023-10-19 RX ORDER — LISINOPRIL 40 MG/1
40 TABLET ORAL
Qty: 30 | Refills: 3 | Status: ACTIVE | COMMUNITY
Start: 2023-10-19

## 2023-10-19 RX ORDER — METFORMIN HYDROCHLORIDE 1000 MG/1
1000 TABLET, COATED ORAL
Qty: 180 | Refills: 1 | Status: ACTIVE | COMMUNITY
Start: 2023-10-19

## 2024-04-16 ENCOUNTER — APPOINTMENT (OUTPATIENT)
Dept: INTERNAL MEDICINE | Facility: CLINIC | Age: 64
End: 2024-04-16
Payer: COMMERCIAL

## 2024-04-16 VITALS
TEMPERATURE: 98 F | HEART RATE: 65 BPM | BODY MASS INDEX: 35.7 KG/M2 | OXYGEN SATURATION: 99 % | SYSTOLIC BLOOD PRESSURE: 157 MMHG | WEIGHT: 194 LBS | HEIGHT: 62 IN | DIASTOLIC BLOOD PRESSURE: 79 MMHG

## 2024-04-16 DIAGNOSIS — I63.9 CEREBRAL INFARCTION, UNSPECIFIED: ICD-10-CM

## 2024-04-16 PROCEDURE — 99214 OFFICE O/P EST MOD 30 MIN: CPT | Mod: GC

## 2024-04-16 PROCEDURE — 36415 COLL VENOUS BLD VENIPUNCTURE: CPT

## 2024-04-16 PROCEDURE — G2211 COMPLEX E/M VISIT ADD ON: CPT | Mod: NC,1L

## 2024-04-16 RX ORDER — AMLODIPINE BESYLATE 10 MG/1
10 TABLET ORAL DAILY
Qty: 90 | Refills: 1 | Status: ACTIVE | COMMUNITY
Start: 2023-10-19

## 2024-04-16 NOTE — ASSESSMENT
[FreeTextEntry1] : 63F with PMH of [place] Palsy (left side), T2DM, CVA x2(R thalamic infarct with residual left sided deficits 9/2019, L BG and b/l frontal infarct 3/2021), CAD, HLD, HTN, Asthma presenting for follow up.  # CVA  Pt has had 4 CVAs, currently on DAPT and high-intensity statin but has not been compliant with statin. Is an active smoker with <40 pack year history. Was last hospitalized in 3/2024 at [facility]. - F/u collateral from [place] - c/w DAPT - c/w atorvastatin  - v/u cbc, cmp, lipid panel - smoking cessation as below   # HTN /79, per daughters BP at home was 180-190/70-80. Pt taking Coreg 6.5 bid, lisinopril 40, amlodipine 5 but was taking 10 which was lowered when hospitalized earlier this year. - Increased amlodipine to 10 qd - c/w lisinopril 40, coreg 6.5  - daughters instructed to record a week's worth of BPs - f/u at next apt  # Smoking cessation Pt has >40 pack year history and reports quitting smoking as of 2 weeks ago. Wants to quit completely and is amenable to help - Discuss smoking cessation strategies at next apt  # T2DM Pt taking metformin 1000 big and Rybelsus (unclear adherance). Last A1C 9.8 in 5/23. - f/u a1c   #HCM Pt has never had CCA screening, mammograms, cervical cancer screening, or lung cancer screening - Discuss at next apt in 2 weeks

## 2024-04-16 NOTE — END OF VISIT
[] : Resident [FreeTextEntry3] : Multiple problems including CAD, stroke and BP is not controlled. She just stopped smoking. Key for her is risk factor modification. Last HgbA1C was high. Will recheck today and increase amlodipine to 10 mg daily. RTC 2 weeks.

## 2024-04-16 NOTE — HISTORY OF PRESENT ILLNESS
[de-identified] : 63F with PMH [place] Palsy (left side), T2DM, CVA x2(R thalamic infarct with residual left sided deficits 9/2019, L BG and b/l frontal infarct 3/2021), CAD, HLD, HTN, Asthma presenting for follow up. Per daughter, pt had stroke last year and another stroke ~3/2024, went to [facility] and was told she had a new stroke. Symptoms prior were weakness, slurring, drooling. CT scan was c/w old stroke. According to family, the hospital did not intervene but changed some medications (Furosemide). Was sent to rehab after discharge and was there for 3 weeks.  Ankle swelling: Numbness HTN: Family checks blood pressure occasionally, today was 180/90.  DM: Last A1C 9.8 6/23, was supposed to see endocrinologist but was not able to schedule  CAD: Follows with Dr. Garber. Denies CP/SOB/RUFFIN Asthma: Uses rescue inhaler (Ventolin) HCM: Never had c-scope, mammograms, cervical cancer. Smokes 1 pack a day for 40 years, wants to quit.   Meds: ASA 81, Atorvastatin 80mg (has not been taking consistently) Carvedilol 6.25mg BID (not currently taking, seeing cards Dr. Fischer in [place]), Amlodipine 5mg (lowered at [place]), Hydralazine (not taking), Lasix 20mg (stopped in 3/2024), Lisinopril 40mg (taking), Memantine (recently stopped), Metformin 1000mg BID (taking), Plavix 75mg (taking), Rybelus (not taking)

## 2024-04-17 LAB
ALBUMIN SERPL ELPH-MCNC: 4.1 G/DL
ALP BLD-CCNC: 98 U/L
ALT SERPL-CCNC: 16 U/L
ANION GAP SERPL CALC-SCNC: 13 MMOL/L
AST SERPL-CCNC: 23 U/L
BASOPHILS # BLD AUTO: 0.03 K/UL
BASOPHILS NFR BLD AUTO: 0.4 %
BILIRUB SERPL-MCNC: 0.2 MG/DL
BUN SERPL-MCNC: 27 MG/DL
CALCIUM SERPL-MCNC: 9.2 MG/DL
CHLORIDE SERPL-SCNC: 108 MMOL/L
CHOLEST SERPL-MCNC: 148 MG/DL
CO2 SERPL-SCNC: 23 MMOL/L
CREAT SERPL-MCNC: 1.59 MG/DL
EGFR: 36 ML/MIN/1.73M2
EOSINOPHIL # BLD AUTO: 0.23 K/UL
EOSINOPHIL NFR BLD AUTO: 3.1 %
ESTIMATED AVERAGE GLUCOSE: 217 MG/DL
GLUCOSE SERPL-MCNC: 79 MG/DL
HBA1C MFR BLD HPLC: 9.2 %
HCT VFR BLD CALC: 37.7 %
HDLC SERPL-MCNC: 55 MG/DL
HGB BLD-MCNC: 12.7 G/DL
IMM GRANULOCYTES NFR BLD AUTO: 0.1 %
LDLC SERPL CALC-MCNC: 78 MG/DL
LYMPHOCYTES # BLD AUTO: 2.04 K/UL
LYMPHOCYTES NFR BLD AUTO: 27.3 %
MAN DIFF?: NORMAL
MCHC RBC-ENTMCNC: 26.6 PG
MCHC RBC-ENTMCNC: 33.7 GM/DL
MCV RBC AUTO: 79 FL
MONOCYTES # BLD AUTO: 0.45 K/UL
MONOCYTES NFR BLD AUTO: 6 %
NEUTROPHILS # BLD AUTO: 4.72 K/UL
NEUTROPHILS NFR BLD AUTO: 63.1 %
NONHDLC SERPL-MCNC: 93 MG/DL
PLATELET # BLD AUTO: 210 K/UL
POTASSIUM SERPL-SCNC: 4.3 MMOL/L
PROT SERPL-MCNC: 7.1 G/DL
RBC # BLD: 4.77 M/UL
RBC # FLD: 14.6 %
SODIUM SERPL-SCNC: 144 MMOL/L
TRIGL SERPL-MCNC: 77 MG/DL
WBC # FLD AUTO: 7.48 K/UL

## 2024-04-18 RX ORDER — DAPAGLIFLOZIN 5 MG/1
5 TABLET, FILM COATED ORAL DAILY
Qty: 30 | Refills: 0 | Status: ACTIVE | COMMUNITY
Start: 2024-04-17 | End: 1900-01-01

## 2024-04-29 ENCOUNTER — APPOINTMENT (OUTPATIENT)
Dept: INTERNAL MEDICINE | Facility: CLINIC | Age: 64
End: 2024-04-29
Payer: MEDICAID

## 2024-04-29 VITALS
DIASTOLIC BLOOD PRESSURE: 79 MMHG | HEIGHT: 62 IN | SYSTOLIC BLOOD PRESSURE: 143 MMHG | OXYGEN SATURATION: 98 % | BODY MASS INDEX: 35.7 KG/M2 | HEART RATE: 66 BPM | TEMPERATURE: 98 F | WEIGHT: 194 LBS

## 2024-04-29 DIAGNOSIS — I10 ESSENTIAL (PRIMARY) HYPERTENSION: ICD-10-CM

## 2024-04-29 DIAGNOSIS — Z12.11 ENCOUNTER FOR SCREENING FOR MALIGNANT NEOPLASM OF COLON: ICD-10-CM

## 2024-04-29 DIAGNOSIS — E11.9 TYPE 2 DIABETES MELLITUS W/OUT COMPLICATIONS: ICD-10-CM

## 2024-04-29 DIAGNOSIS — F33.1 MAJOR DEPRESSIVE DISORDER, RECURRENT, MODERATE: ICD-10-CM

## 2024-04-29 DIAGNOSIS — Z71.6 TOBACCO ABUSE COUNSELING: ICD-10-CM

## 2024-04-29 PROCEDURE — G2211 COMPLEX E/M VISIT ADD ON: CPT | Mod: NC,1L

## 2024-04-29 PROCEDURE — 99214 OFFICE O/P EST MOD 30 MIN: CPT | Mod: GC

## 2024-04-29 RX ORDER — ORAL SEMAGLUTIDE 3 MG/1
3 TABLET ORAL DAILY
Refills: 0 | Status: DISCONTINUED | COMMUNITY
Start: 2023-10-19 | End: 2024-04-29

## 2024-04-29 RX ORDER — NICOTINE POLACRILEX 2 MG/1
2 GUM, CHEWING ORAL
Qty: 720 | Refills: 0 | Status: ACTIVE | COMMUNITY
Start: 2024-04-29 | End: 1900-01-01

## 2024-04-29 RX ORDER — GLIPIZIDE 2.5 MG/1
2.5 TABLET ORAL
Refills: 0 | Status: ACTIVE | COMMUNITY
Start: 2024-04-29

## 2024-04-29 RX ORDER — APIXABAN 5 MG/1
5 TABLET, FILM COATED ORAL
Refills: 0 | Status: ACTIVE | COMMUNITY
Start: 2024-04-29

## 2024-04-29 RX ORDER — FUROSEMIDE 20 MG/1
20 TABLET ORAL DAILY
Refills: 0 | Status: DISCONTINUED | COMMUNITY
Start: 2023-10-19 | End: 2024-04-29

## 2024-04-29 RX ORDER — METOPROLOL SUCCINATE 50 MG/1
50 TABLET, EXTENDED RELEASE ORAL DAILY
Refills: 0 | Status: ACTIVE | COMMUNITY
Start: 2024-04-29

## 2024-04-29 RX ORDER — CLOPIDOGREL 75 MG/1
75 TABLET, FILM COATED ORAL DAILY
Qty: 21 | Refills: 0 | Status: DISCONTINUED | COMMUNITY
Start: 2023-06-23 | End: 2024-04-29

## 2024-04-29 RX ORDER — MEMANTINE HYDROCHLORIDE 5 MG/1
5 TABLET, FILM COATED ORAL DAILY
Refills: 0 | Status: DISCONTINUED | COMMUNITY
Start: 2023-10-19 | End: 2024-04-29

## 2024-04-29 RX ORDER — HYDRALAZINE HYDROCHLORIDE 100 MG/1
100 TABLET ORAL TWICE DAILY
Refills: 0 | Status: DISCONTINUED | COMMUNITY
Start: 2023-10-19 | End: 2024-04-29

## 2024-04-29 RX ORDER — CARVEDILOL 6.25 MG/1
6.25 TABLET, FILM COATED ORAL TWICE DAILY
Refills: 0 | Status: DISCONTINUED | COMMUNITY
Start: 2023-10-19 | End: 2024-04-29

## 2024-04-30 ENCOUNTER — TRANSCRIPTION ENCOUNTER (OUTPATIENT)
Age: 64
End: 2024-04-30

## 2024-04-30 NOTE — PHYSICAL EXAM
[No Acute Distress] : no acute distress [Normal Outer Ear/Nose] : the outer ears and nose were normal in appearance [No JVD] : no jugular venous distention [No Respiratory Distress] : no respiratory distress  [No Accessory Muscle Use] : no accessory muscle use [Clear to Auscultation] : lungs were clear to auscultation bilaterally [Normal Rate] : normal rate  [Regular Rhythm] : with a regular rhythm [Normal S1, S2] : normal S1 and S2 [No Joint Swelling] : no joint swelling [No Rash] : no rash [de-identified] : 2+ LE edema, shoes are too tight [de-identified] : Decreased sensation on first three toes on the right foot, ambulates with walker [de-identified] : Tearful

## 2024-04-30 NOTE — END OF VISIT
[] : Resident [FreeTextEntry3] : Patient is here for follow up of hypertension and diabetes. BP is better and blood sugars are consistently ~100 according to the daughter. Will continue current regimens. For BP, lisinopril 40 mg, amlodipine 10 mg, metoprolol succinate 50 mg daily. Daughter says that the patient is taking metformin 1000 mg daily plus Fraxiga 5 mg. Since BS appear controlled, will continue this regimen,  Patient is also interested in trying nicotine gum for smoking cessation. Will Rx. I counseled her on proper use. TeleVisit in 2 weeks to f/u on BP. In person visit in 3 months. Patient is depressed according to daughter and PHq9. No thoughts of self harm. Will refer to  for eval.

## 2024-04-30 NOTE — HEALTH RISK ASSESSMENT
[0] : 2) Feeling down, depressed, or hopeless: Not at all (0) [PHQ-2 Negative - No further assessment needed] : PHQ-2 Negative - No further assessment needed [[id number]] : 0

## 2024-04-30 NOTE — ASSESSMENT
[FreeTextEntry1] : #HCM  - Colonoscopy referral  - Not willing to do mammogram at this time  - Received Shingrix  - Denies flu vaccine  - Optho and podiatry referral for DM screening   RTC in 2 weeks for telehealth to review BP checks, smoking cessation with nicotine gum and depressed mood. F/u in 3 months for repeat A1c check. RTC sooner if needed. Discussed case with Dr. Rahman.

## 2024-04-30 NOTE — HISTORY OF PRESENT ILLNESS
[FreeTextEntry1] : follow up [de-identified] : 63F with PMH [place] Palsy (left side), T2DM, CVA x2(R thalamic infarct with residual left sided deficits 9/2019, L BG and b/l frontal infarct 3/2021), CAD, HLD, HTN, Asthma presenting for follow up. Last visit patient was s/p discharge from [facility] and was diagnosed with a new stroke and sent to a rehab. Patients' medications were changed at the time. Amlodipine was increased from 5 to 10 mg and patient was told to monitor BP daily. Per daughter, SBP has been 160, but has been taking BP from the forearm. Discussed proper BP monitoring techniques. BP at time of visit was 140s/70s.  A1c from 4/16 was 9.2, so farxiga 5 was prescribed per chart note. Patient has been tolerating well. One episode of hypoglycemia to the 60s. Denies any follow up with podiatry or optho. Reports swelling and shooting pain in her right big toe. Additionally patient states she misses her . Phq2 3 and phq9 12. Per daughter, she does not get out of bed and seems very sad and unmotivated. They are open to speaking with someone.   Has upcoming neurology appointment and will discuss restarting memantine - was held after hospitalization. Additionally, has never had any screenings done. She is afraid of mammogram but is open to a colonoscopy at this time. Lastly, received Shingrix. No flu vaccines at this time.   Called daughter at home to tell us exact medications patient is taking and adjusted med list accordingly: glipizide 2.5 mg, Eliquis 5 mg, farxiga 5 mg, amlodipine 10 mg, lisinopril 40 mg, metoprolol 50 mg, atorvastatin 80 mg, metformin 1000 mg BID

## 2024-05-03 ENCOUNTER — TRANSCRIPTION ENCOUNTER (OUTPATIENT)
Age: 64
End: 2024-05-03

## 2024-05-09 ENCOUNTER — NON-APPOINTMENT (OUTPATIENT)
Age: 64
End: 2024-05-09

## 2024-09-13 NOTE — SWALLOW BEDSIDE ASSESSMENT ADULT - MODE OF PRESENTATION
Adderall, Zoloft      Last Written Prescription Date:  8.19.24, 8.20.24  Last Fill Quantity: #60, #60,   # refills: 0  Last Office Visit: 8.20.24  Future Office visit:       Routing refill request to provider for review/approval because:  Drug not on the [facility], P or [facility] refill protocol or controlled substance     cup/spoon/self fed

## 2024-09-16 ENCOUNTER — APPOINTMENT (OUTPATIENT)
Dept: INTERNAL MEDICINE | Facility: CLINIC | Age: 64
End: 2024-09-16
Payer: COMMERCIAL

## 2024-09-16 DIAGNOSIS — I63.9 CEREBRAL INFARCTION, UNSPECIFIED: ICD-10-CM

## 2024-09-16 PROCEDURE — 99442: CPT | Mod: 93

## 2024-09-16 NOTE — HISTORY OF PRESENT ILLNESS
[FreeTextEntry1] : Follow-up of stroke [de-identified] : This was a televisit however patient's family could not access the video so therefore it was a telephone visit to which the patient's family agreed.  Patient is unable to come to the phone and has difficulty communicating since her recent stroke.  The daughter states that the patient is now homebound, incontinent and is in need of more rehab services.  The patient is no longer ambulatory.  Post her recent stroke, the patient did spend time at a rehab facility but the patient's daughter feels she was discharged prematurely. I reviewed the patient's medications with the patient's daughter and there have been no changes except for discontinuation of glipizide. Patient states blood pressure at home has been excellent with systolics between 120 and 130 and diastolics between 70 and 85.  Also, except for a very brief episode of hyperglycemia, the patient's blood sugar is in the 100-1 50 range consistently.

## 2024-09-16 NOTE — ASSESSMENT
[FreeTextEntry1] : # # CVA: Patient has incurred additional disability from her recent stroke.  She is now homebound and nonambulatory.  She may need further inpatient rehabilitation.  I asked the family if they can bring her in for evaluation and they said that they cannot.  Will therefore try to arrange for in-home medical evaluation by a physician or NP.  I have communicated this to our .  Will schedule follow-up televisit in 2 weeks to follow up.

## 2024-10-08 ENCOUNTER — APPOINTMENT (OUTPATIENT)
Dept: INTERNAL MEDICINE | Facility: CLINIC | Age: 64
End: 2024-10-08

## 2024-10-11 NOTE — H&P ADULT - PROBLEM/PLAN-6
"10/11/24 INR today 1.7 , per Dr Napier . Ok to proceed w Aflutter Ablation on 10/14 as planned without ROSA prior  \" No need, Will perform ICE\"  Updated daughter Iesha via Rolando Patel 330 pm   " DISPLAY PLAN FREE TEXT

## 2024-11-11 NOTE — PATIENT PROFILE ADULT - FALL HARM RISK - FALLEN IN PAST
Patient mobility status  with no difficulty.     I have reviewed discharge instructions with the patient.  The patient verbalized understanding.    Patient left ED via Discharge Method: ambulatory to Home with Parent.    Opportunity for questions and clarification provided.     Patient given 2 scripts.         
No